# Patient Record
Sex: MALE | Race: WHITE | NOT HISPANIC OR LATINO | Employment: OTHER | URBAN - METROPOLITAN AREA
[De-identification: names, ages, dates, MRNs, and addresses within clinical notes are randomized per-mention and may not be internally consistent; named-entity substitution may affect disease eponyms.]

---

## 2017-01-11 DIAGNOSIS — R73.03 PREDIABETES: ICD-10-CM

## 2017-01-11 DIAGNOSIS — E78.5 HYPERLIPIDEMIA LDL GOAL <130: ICD-10-CM

## 2017-01-11 LAB
ALBUMIN SERPL-MCNC: 4 G/DL (ref 3.4–5)
ALP SERPL-CCNC: 56 U/L (ref 40–150)
ALT SERPL W P-5'-P-CCNC: 23 U/L (ref 0–70)
ANION GAP SERPL CALCULATED.3IONS-SCNC: 5 MMOL/L (ref 3–14)
AST SERPL W P-5'-P-CCNC: 14 U/L (ref 0–45)
BILIRUB SERPL-MCNC: 0.6 MG/DL (ref 0.2–1.3)
BUN SERPL-MCNC: 13 MG/DL (ref 7–30)
CALCIUM SERPL-MCNC: 10.4 MG/DL (ref 8.5–10.1)
CHLORIDE SERPL-SCNC: 105 MMOL/L (ref 94–109)
CHOLEST SERPL-MCNC: 203 MG/DL
CO2 SERPL-SCNC: 32 MMOL/L (ref 20–32)
CREAT SERPL-MCNC: 0.95 MG/DL (ref 0.66–1.25)
GFR SERPL CREATININE-BSD FRML MDRD: 80 ML/MIN/1.7M2
GLUCOSE SERPL-MCNC: 93 MG/DL (ref 70–99)
HBA1C MFR BLD: 5.6 % (ref 4.3–6)
HDLC SERPL-MCNC: 77 MG/DL
LDLC SERPL CALC-MCNC: 98 MG/DL
NONHDLC SERPL-MCNC: 126 MG/DL
POTASSIUM SERPL-SCNC: 4.4 MMOL/L (ref 3.4–5.3)
PROT SERPL-MCNC: 7.1 G/DL (ref 6.8–8.8)
SODIUM SERPL-SCNC: 142 MMOL/L (ref 133–144)
TRIGL SERPL-MCNC: 138 MG/DL

## 2017-01-11 PROCEDURE — 80053 COMPREHEN METABOLIC PANEL: CPT | Performed by: INTERNAL MEDICINE

## 2017-01-11 PROCEDURE — 80061 LIPID PANEL: CPT | Performed by: INTERNAL MEDICINE

## 2017-01-11 PROCEDURE — 83036 HEMOGLOBIN GLYCOSYLATED A1C: CPT | Performed by: INTERNAL MEDICINE

## 2017-01-11 PROCEDURE — 36415 COLL VENOUS BLD VENIPUNCTURE: CPT | Performed by: INTERNAL MEDICINE

## 2017-01-17 ENCOUNTER — OFFICE VISIT (OUTPATIENT)
Dept: INTERNAL MEDICINE | Facility: CLINIC | Age: 62
End: 2017-01-17
Payer: COMMERCIAL

## 2017-01-17 VITALS
DIASTOLIC BLOOD PRESSURE: 74 MMHG | WEIGHT: 223 LBS | SYSTOLIC BLOOD PRESSURE: 136 MMHG | BODY MASS INDEX: 26.33 KG/M2 | HEART RATE: 83 BPM | OXYGEN SATURATION: 97 % | HEIGHT: 77 IN | RESPIRATION RATE: 12 BRPM | TEMPERATURE: 98.1 F

## 2017-01-17 DIAGNOSIS — R73.03 PREDIABETES: ICD-10-CM

## 2017-01-17 DIAGNOSIS — D12.6 BENIGN NEOPLASM OF COLON, UNSPECIFIED PART OF COLON: ICD-10-CM

## 2017-01-17 DIAGNOSIS — Z86.718 PERSONAL HISTORY OF VENOUS THROMBOSIS AND EMBOLISM: ICD-10-CM

## 2017-01-17 DIAGNOSIS — D68.51 HETEROZYGOUS FACTOR V LEIDEN MUTATION (H): ICD-10-CM

## 2017-01-17 DIAGNOSIS — D68.52 PROTHROMBIN GENE MUTATION (H): ICD-10-CM

## 2017-01-17 DIAGNOSIS — Z12.5 SPECIAL SCREENING FOR MALIGNANT NEOPLASM OF PROSTATE: ICD-10-CM

## 2017-01-17 DIAGNOSIS — E78.5 HYPERLIPIDEMIA LDL GOAL <130: ICD-10-CM

## 2017-01-17 DIAGNOSIS — I10 ESSENTIAL HYPERTENSION: Primary | ICD-10-CM

## 2017-01-17 PROCEDURE — 99214 OFFICE O/P EST MOD 30 MIN: CPT | Performed by: INTERNAL MEDICINE

## 2017-01-17 RX ORDER — HYDROCHLOROTHIAZIDE 25 MG/1
25 TABLET ORAL DAILY
Qty: 90 TABLET | Refills: 1 | Status: SHIPPED | OUTPATIENT
Start: 2017-01-17 | End: 2017-05-19

## 2017-01-17 RX ORDER — FOLIC ACID 1 MG/1
1000 TABLET ORAL DAILY
Qty: 100 TABLET | Refills: 3 | Status: SHIPPED | OUTPATIENT
Start: 2017-01-17 | End: 2017-05-19

## 2017-01-17 RX ORDER — VALSARTAN 160 MG/1
160 TABLET ORAL DAILY
Qty: 90 TABLET | Refills: 1 | Status: SHIPPED | OUTPATIENT
Start: 2017-01-17 | End: 2017-09-19

## 2017-01-17 NOTE — NURSING NOTE
"Chief Complaint   Patient presents with     Lipids     Hypertension       Initial /74 mmHg  Pulse 83  Temp(Src) 98.1  F (36.7  C)  Resp 12  Ht 6' 5\" (1.956 m)  Wt 223 lb (101.152 kg)  BMI 26.44 kg/m2  SpO2 97% Estimated body mass index is 26.44 kg/(m^2) as calculated from the following:    Height as of this encounter: 6' 5\" (1.956 m).    Weight as of this encounter: 223 lb (101.152 kg).  BP completed using cuff size: large  Ace CRUZ      "

## 2017-01-17 NOTE — MR AVS SNAPSHOT
After Visit Summary   1/17/2017    Alvin Ontiveros    MRN: [id number]           Patient Information     Date Of Birth          1955        Visit Information        Provider Department      1/17/2017 7:00 AM Du Almaraz MD [facility]        Today's Diagnoses     Essential hypertension    -  1     Prediabetes         Benign neoplasm of colon, unspecified part of colon         Personal history of venous thrombosis and embolism         Hyperlipidemia LDL goal <130         Heterozygous factor V Leiden mutation (H)         Heterozygous Prothrombin Gene Mutation           Care Instructions    Everything looks fine!    Refills of medications have been faxed to your pharmacy.     See me in six months, with fasting labs a few days in advance, sooner if problems.          Follow-ups after your visit        Your next 10 appointments already scheduled     Feb 06, 2017  4:30 PM   [facility]tico[facility] Visit with [facility]   [facility]    [address] [facility] [address]  [facility] [address] [phone]              Who to contact     If you have questions or need follow up information about today's clinic visit or your schedule please contact [facility] directly at [phone].  Normal or non-critical lab and imaging results will be communicated to you by [employer], letter or phone within 4 business days after the clinic has received the results. If you do not hear from us within 7 days, please contact the clinic through [employer] or phone. If you have a critical or abnormal lab result, we will notify you by phone as soon as possible.  Submit refill requests through [employer] or call your pharmacy and they will forward the refill request to us. Please allow 3 business days for your refill to be completed.          Additional Information About Your Visit        [email or web address] Information     [employer] lets you send messages to  "your doctor, view your test results, renew your prescriptions, schedule appointments and more. To sign up, go to [email or web address] . Click on \"Log in\" on the left side of the screen, which will take you to the Welcome page. Then click on \"Sign up Now\" on the right side of the page.     You will be asked to enter the access code listed below, as well as some personal information. Please follow the directions to create your username and password.     Your access code is: [email or web address]  Expires: 2017  7:23 AM     Your access code will  in 90 days. If you need help or a new code, please call your [facility] or [phone].        Care EveryWhere ID     This is your Care EveryWhere ID. This could be used by other organizations to access your [facility] medical records  [id number]        Your Vitals Were     Pulse Temperature Respirations Height BMI (Body Mass Index) Pulse Oximetry    83 98.1  F (36.7  C) 12 6' 5\" (1.956 m) 26.44 kg/m2 97%       Blood Pressure from Last 3 Encounters:   17 136/74   05/10/16 138/78   12/22/15 124/78    Weight from Last 3 Encounters:   17 223 lb (101.152 kg)   05/10/16 219 lb (99.338 kg)   12/22/15 225 lb (102.059 kg)              Today, you had the following     No orders found for display         Where to get your medicines      These medications were sent to [facility] - [address] [address]     Phone:  [phone]    - folic acid 1 MG tablet  - hydrochlorothiazide 25 MG tablet  - valsartan 160 MG tablet       Primary Care Provider Office Phone # Fax #    Du Almaraz [phone]       [facility] [address]  [facility] [address]        Thank you!     Thank you for choosing [facility]  for your care. Our goal is always to provide you with excellent care. Hearing back from our patients is one way we can continue to " improve our services. Please take a few minutes to complete the written survey that you may receive in the mail after your visit with us. Thank you!             Your Updated Medication List - Protect others around you: Learn how to safely use, store and throw away your medicines at www.disposemymeds.org.          This list is accurate as of: 1/17/17  7:31 AM.  Always use your most recent med list.                   Brand Name Dispense Instructions for use    folic acid 1 MG tablet    FOLVITE    100 tablet    Take 1 tablet (1,000 mcg) by mouth daily       hydrochlorothiazide 25 MG tablet    HYDRODIURIL    90 tablet    Take 1 tablet (25 mg) by mouth daily       omeprazole 20 MG CR capsule    priLOSEC    90 capsule    Take 1 capsule (20 mg) by mouth daily       pravastatin 40 MG tablet    PRAVACHOL    90 tablet    Take 1 tablet (40 mg) by mouth daily at bedtime.       valsartan 160 MG tablet    DIOVAN    90 tablet    Take 1 tablet (160 mg) by mouth daily       warfarin 5 MG tablet    JANTOVEN    90 tablet    Take 1 tablet daily except half tablet on Wednesday and Sunday or as instructed based on INR result.

## 2017-01-17 NOTE — PROGRESS NOTES
SUBJECTIVE:                                                    Alvin Ontiveros is a 61 year old male who presents to clinic today for the following health issues:  Prediabetes, hyperlipidemia, hypertension, chronic oral anticoagulation with h/o pulmonary embolism, hereditary clotting predisposition.     Prediabetes --   A1C      5.6   1/11/2017  A1C      5.6   5/3/2016  A1C      6.0   12/15/2015  A1C      5.9   1/8/2015  A1C      5.6   10/29/2012  GLUCOSE   Date Value Ref Range Status   01/11/2017 93 70 - 99 mg/dL Final     CREATININE   Date Value Ref Range Status   01/11/2017 0.95 0.66 - 1.25 mg/dL Final       Hyperlipidemia Follow-Up      Rate your low fat/cholesterol diet?: good    Taking statin?  Yes, no muscle aches from statin - Pravastatin    Other lipid medications/supplements?:  None  Recent Labs   Lab Test  01/11/17   0735  05/03/16   0752   10/15/15   0811  01/08/15   0834   CHOL  203*  210*   < >  197  217*   HDL  77  84   < >  69  71   LDL  98  93   < >  74  117   TRIG  138  165*   < >  269*  147   CHOLHDLRATIO   --    --    --   2.9  3.1    < > = values in this interval not displayed.        Hypertension Follow-up      Outpatient blood pressures are not being checked.    Low Salt Diet: no added salt  BP Readings from Last 3 Encounters:   01/17/17 136/74   05/10/16 138/78   12/22/15 124/78          Amount of exercise or physical activity: 6-7 days/week for an average of 45-60 minutes    Problems taking medications regularly: No    Medication side effects: none    Diet: low salt and low fat/cholesterol      Past/recent records reviewed and discussed for -- medications, colon screening (last colonoscopy 4/28/16 - 1 polyp, 5 year follow up).      Problem list and histories reviewed & adjusted, as indicated.  Additional history: as documented    Problem list, Medication list, Allergies, and Medical/Social/Surgical histories reviewed in [facility] and updated as appropriate.    ROS:  REVIEW OF SYSTEMS: The  "following systems have been completely reviewed and are negative except as noted above:   Constitutional, HEENT, respiratory, cardiovascular, gastrointestinal, genitourinary, dermatologic, hematologic, and neurologic systems.     Noted recent URI with cough and sore throat. Noted intermittent blood in stool.     OBJECTIVE:                                                    /74 mmHg  Pulse 83  Temp(Src) 98.1  F (36.7  C)  Resp 12  Ht 1.956 m (6' 5\")  Wt 101.152 kg (223 lb)  BMI 26.44 kg/m2  SpO2 97%  Body mass index is 26.44 kg/(m^2).  GENERAL: healthy, alert and no distress, overweight  RESP: lungs clear to auscultation - no rales, rhonchi or wheezes  CV: regular rate and rhythm, normal S1 S2, no S3 or S4, no murmur, click or rub, no peripheral edema and peripheral pulses strong  NEURO: mentation intact and speech normal  PSYCH: mentation appears normal, affect normal/bright    Diagnostic Test Results:  Results for orders placed or performed in visit on 01/11/17   Hemoglobin A1c   Result Value Ref Range    Hemoglobin A1C 5.6 4.3 - 6.0 %   Comprehensive metabolic panel   Result Value Ref Range    Sodium 142 133 - 144 mmol/L    Potassium 4.4 3.4 - 5.3 mmol/L    Chloride 105 94 - 109 mmol/L    Carbon Dioxide 32 20 - 32 mmol/L    Anion Gap 5 3 - 14 mmol/L    Glucose 93 70 - 99 mg/dL    Urea Nitrogen 13 7 - 30 mg/dL    Creatinine 0.95 0.66 - 1.25 mg/dL    GFR Estimate 80 >60 mL/min/1.7m2    GFR Estimate If Black >90   GFR Calc   >60 mL/min/1.7m2    Calcium 10.4 (H) 8.5 - 10.1 mg/dL    Bilirubin Total 0.6 0.2 - 1.3 mg/dL    Albumin 4.0 3.4 - 5.0 g/dL    Protein Total 7.1 6.8 - 8.8 g/dL    Alkaline Phosphatase 56 40 - 150 U/L    ALT 23 0 - 70 U/L    AST 14 0 - 45 U/L   Lipid panel reflex to direct LDL   Result Value Ref Range    Cholesterol 203 (H) <200 mg/dL    Triglycerides 138 <150 mg/dL    HDL Cholesterol 77 >39 mg/dL    LDL Cholesterol Calculated 98 <100 mg/dL    Non HDL Cholesterol 126 " <130 mg/dL        ASSESSMENT/PLAN:                                                    (I10) Essential hypertension  (primary encounter diagnosis)  Comment: BP at target. Continue current meds. 136/74  Plan: valsartan (DIOVAN) 160 MG tablet,         hydrochlorothiazide (HYDRODIURIL) 25 MG tablet          (R73.03) Prediabetes  Comment: stable Will continue to follow.  Plan: folic acid (FOLVITE) 1 MG tablet        Follow up in 6 months    (D12.6) Benign neoplasm of colon, unspecified part of colon  Plan: rescreen in 2021    (Z86.718) Personal history of venous thrombosis and embolism  Plan: Continue chronic oral anticoagulation. Continue to follow with anticoagulation clinic. Follow up with MD in 6 months    (E78.5) Hyperlipidemia LDL goal <130  Plan: LDL at target. Continue current meds. follow up 6 months    (D68.51) Heterozygous factor V Leiden mutation (H)  (D68.59) Heterozygous Prothrombin Gene Mutation  Plan: Continue warfarin. Follow up in 6 months      Patient Instructions   Everything looks fine!    Refills of medications have been faxed to your pharmacy.     See me in six months, with fasting labs a few days in advance, sooner if problems.          This document serves as a record of the services and decisions personally performed and made by Du Almaraz MD. It was created on their behalf by Kiran Magdaleno, a trained medical scribe. The creation of this document is based the provider's statements to the medical scribe.  Kiran Magdaleno January 17, 2017 7:23 AM       Du lAmaraz MD  [facility]      Please abstract the following data from this visit with this patient into the appropriate field in Epic:    Colonoscopy done on this date: 7/7/2012 (approximately), by this group: Dr Ling, [facility], results were negative/normal.

## 2017-01-27 DIAGNOSIS — D68.51 HETEROZYGOUS FACTOR V LEIDEN MUTATION (H): Primary | ICD-10-CM

## 2017-01-27 RX ORDER — WARFARIN SODIUM 5 MG/1
TABLET ORAL
Qty: 90 TABLET | Refills: 1 | Status: SHIPPED | OUTPATIENT
Start: 2017-01-27 | End: 2017-07-25

## 2017-01-27 NOTE — TELEPHONE ENCOUNTER
Warfarin       Last Office Visit with [facility], [facility] or [facility] prescribing provider: 1/17/17       Date and Result of Last PT/INR:   INR      2.3   12/27/2016  INR      3.0   11/14/2016  INR     1.05   1/30/2013  INR     1.86   2/5/2006     Prescription approved per [facility] Refill Protocol.  Kari Lizarraga RN

## 2017-02-06 ENCOUNTER — ANTICOAGULATION THERAPY VISIT (OUTPATIENT)
Dept: ANTICOAGULATION | Facility: CLINIC | Age: 62
End: 2017-02-06
Payer: COMMERCIAL

## 2017-02-06 DIAGNOSIS — Z86.718 PERSONAL HISTORY OF VENOUS THROMBOSIS AND EMBOLISM: ICD-10-CM

## 2017-02-06 DIAGNOSIS — D68.51 HETEROZYGOUS FACTOR V LEIDEN MUTATION (H): ICD-10-CM

## 2017-02-06 DIAGNOSIS — D68.52 PROTHROMBIN GENE MUTATION (H): ICD-10-CM

## 2017-02-06 DIAGNOSIS — Z79.01 LONG-TERM (CURRENT) USE OF ANTICOAGULANTS: Primary | ICD-10-CM

## 2017-02-06 LAB — INR POINT OF CARE: 2.5 (ref 0.86–1.14)

## 2017-02-06 PROCEDURE — 99207 ZZC NO CHARGE NURSE ONLY: CPT

## 2017-02-06 PROCEDURE — 85610 PROTHROMBIN TIME: CPT | Mod: QW

## 2017-02-06 PROCEDURE — 36416 COLLJ CAPILLARY BLOOD SPEC: CPT

## 2017-02-06 NOTE — MR AVS SNAPSHOT
Alvin Ontiveros   2/6/2017 4:30 PM   Anticoagulation Therapy Visit    Description:  61 year old male   Provider:  [facility]   Department:  [facility]           INR as of 2/6/2017     Selected INR 2.5 (2/6/2017)      Anticoagulation Summary as of 2/6/2017     INR goal 2.0-3.0   Selected INR 2.5 (2/6/2017)   Full instructions 2.5 mg on Sun, Wed; 5 mg all other days   Next INR check 3/20/2017    Indications   Long-term (current) use of anticoagulants [Z79.01] [Z79.01]  Heterozygous factor V Leiden mutation (H) [D68.51]  Heterozygous Prothrombin Gene Mutation [D68.59]  Personal history of venous thrombosis and embolism [Z86.718]         Your next Anticoagulation Clinic appointment(s)     Mar 20, 2017  4:30 PM   Anticoagulation Visit with [facility]   [facility]    [address] [facility] [address]  [facility] [address] [phone]              Contact Numbers     [facility] Phone Numbers:  Anticoagulation Clinic Appointments : [phone]  Anticoagulation Nurse: [phone]         February 2017 Details    Sun Mon Tue Wed Thu Fri Sat        1               2               3               4                 5               6      5 mg   See details      7      5 mg         8      2.5 mg         9      5 mg         10      5 mg         11      5 mg           12      2.5 mg         13      5 mg         14      5 mg         15      2.5 mg         16      5 mg         17      5 mg         18      5 mg           19      2.5 mg         20      5 mg         21      5 mg         22      2.5 mg         23      5 mg         24      5 mg         25      5 mg           26      2.5 mg         27      5 mg         28      5 mg              Date Details   02/06 This INR check               How to take your warfarin dose     To take:  2.5 mg Take 0.5 of a 5 mg tablet.    To take:  5 mg Take 1 of the 5 mg tablets.           March 2017  Details    Sun Mon Tue Wed Thu Fri Sat        1      2.5 mg         2      5 mg         3      5 mg         4      5 mg           5      2.5 mg         6      5 mg         7      5 mg         8      2.5 mg         9      5 mg         10      5 mg         11      5 mg           12      2.5 mg         13      5 mg         14      5 mg         15      2.5 mg         16      5 mg         17      5 mg         18      5 mg           19      2.5 mg         20            21               22               23               24               25                 26               27               28               29               30               31                 Date Details   No additional details    Date of next INR:  3/20/2017         How to take your warfarin dose     To take:  2.5 mg Take 0.5 of a 5 mg tablet.    To take:  5 mg Take 1 of the 5 mg tablets.

## 2017-02-06 NOTE — PROGRESS NOTES
ANTICOAGULATION FOLLOW-UP CLINIC VISIT    Patient Name:  Alvin Ontiveros  Date:  2/6/2017  Contact Type:  Face to Face    SUBJECTIVE:     Patient Findings     Positives No Problem Findings           OBJECTIVE    INR PROTIME   Date Value Ref Range Status   02/06/2017 2.5* 0.86 - 1.14 Final       ASSESSMENT / PLAN  INR assessment THER    Recheck INR In: 6 WEEKS    INR Location Clinic      Anticoagulation Summary as of 2/6/2017     INR goal 2.0-3.0   Selected INR 2.5 (2/6/2017)   Maintenance plan 2.5 mg (5 mg x 0.5) on Sun, Wed; 5 mg (5 mg x 1) all other days   Full instructions 2.5 mg on Sun, Wed; 5 mg all other days   Weekly total 30 mg   No change documented Luna Oakley RN   Plan last modified Luna Oakley RN (4/11/2016)   Next INR check 3/20/2017   Priority INR   Target end date     Indications   Long-term (current) use of anticoagulants [Z79.01] [Z79.01]  Heterozygous factor V Leiden mutation (H) [D68.51]  Heterozygous Prothrombin Gene Mutation [D68.59]  Personal history of venous thrombosis and embolism [Z86.718]         Anticoagulation Episode Summary     INR check location     Preferred lab     Send INR reminders to [facility]    Comments Takes in AM      Anticoagulation Care Providers     Provider Role Specialty Phone number    Du Almaraz MD [facility] Internal Medicine [phone]            See the Encounter Report to view Anticoagulation Flowsheet and Dosing Calendar (Go to Encounters tab in chart review, and find the Anticoagulation Therapy Visit)        Luna Oakley RN

## 2017-03-14 ENCOUNTER — TELEPHONE (OUTPATIENT)
Dept: INTERNAL MEDICINE | Facility: CLINIC | Age: 62
End: 2017-03-14

## 2017-03-14 NOTE — TELEPHONE ENCOUNTER
Panel Management Review      Patient has the following on his problem list:       IVD   ASA: FAILED    Last LDL:    Lab Results   Component Value Date    CHOL 203 01/11/2017     Lab Results   Component Value Date    HDL 77 01/11/2017     Lab Results   Component Value Date    LDL 98 01/11/2017     Lab Results   Component Value Date    TRIG 138 01/11/2017        Lab Results   Component Value Date    CHOLHDLRATIO 2.9 10/15/2015        Is the patient on a Statin? YES   Is the patient on Aspirin? NO                  Medications     HMG CoA Reductase Inhibitors    pravastatin (PRAVACHOL) 40 MG tablet          Last three blood pressure readings:  BP Readings from Last 3 Encounters:   01/17/17 136/74   05/10/16 138/78   12/22/15 124/78        Tobacco History:     History   Smoking Status     Former Smoker     Packs/day: 1.00     Years: 30.00     Types: Cigarettes     Quit date: 12/7/2005   Smokeless Tobacco     Never Used       Hypertension   Last three blood pressure readings:  BP Readings from Last 3 Encounters:   01/17/17 136/74   05/10/16 138/78   12/22/15 124/78     Blood pressure: Passed    HTN Guidelines:  Age 18-59 BP range:  Less than 140/90  Age 60-85 with Diabetes:  Less than 140/90  Age 60-85 without Diabetes:  less than 150/90      Composite cancer screening  Chart review shows that this patient is due/due soon for the following Colonoscopy  Summary:    Patient is due/failing the following:   COLONOSCOPY    Action needed:   colonoscopy    Type of outreach:    Phone, left message for patient to call back.     Questions for provider review:    None                                                                                                                                    Ace CRUZ       Chart routed to none .

## 2017-03-20 ENCOUNTER — ANTICOAGULATION THERAPY VISIT (OUTPATIENT)
Dept: ANTICOAGULATION | Facility: CLINIC | Age: 62
End: 2017-03-20
Payer: COMMERCIAL

## 2017-03-20 DIAGNOSIS — Z79.01 LONG-TERM (CURRENT) USE OF ANTICOAGULANTS: ICD-10-CM

## 2017-03-20 DIAGNOSIS — Z86.718 PERSONAL HISTORY OF VENOUS THROMBOSIS AND EMBOLISM: ICD-10-CM

## 2017-03-20 DIAGNOSIS — D68.51 HETEROZYGOUS FACTOR V LEIDEN MUTATION (H): ICD-10-CM

## 2017-03-20 DIAGNOSIS — D68.52 PROTHROMBIN GENE MUTATION (H): ICD-10-CM

## 2017-03-20 LAB — INR POINT OF CARE: 2.8 (ref 0.86–1.14)

## 2017-03-20 PROCEDURE — 36416 COLLJ CAPILLARY BLOOD SPEC: CPT

## 2017-03-20 PROCEDURE — 85610 PROTHROMBIN TIME: CPT | Mod: QW

## 2017-03-20 PROCEDURE — 99207 ZZC NO CHARGE NURSE ONLY: CPT

## 2017-03-20 NOTE — PROGRESS NOTES
ANTICOAGULATION FOLLOW-UP CLINIC VISIT    Patient Name:  Alvin Ontiveros  Date:  3/20/2017  Contact Type:  Face to Face    SUBJECTIVE:     Patient Findings     Positives No Problem Findings           OBJECTIVE    INR Protime   Date Value Ref Range Status   03/20/2017 2.8 (A) 0.86 - 1.14 Final       ASSESSMENT / PLAN  INR assessment THER    Recheck INR In: 6 WEEKS    INR Location Clinic      Anticoagulation Summary as of 3/20/2017     INR goal 2.0-3.0   Today's INR 2.8   Maintenance plan 2.5 mg (5 mg x 0.5) on Sun, Wed; 5 mg (5 mg x 1) all other days   Full instructions 2.5 mg on Sun, Wed; 5 mg all other days   Weekly total 30 mg   No change documented Luna Oakley RN   Plan last modified Luna Oakley RN (4/11/2016)   Next INR check 5/1/2017   Priority INR   Target end date     Indications   Long-term (current) use of anticoagulants [Z79.01] [Z79.01]  Heterozygous factor V Leiden mutation (H) [D68.51]  Heterozygous Prothrombin Gene Mutation [D68.59]  Personal history of venous thrombosis and embolism [Z86.718]         Anticoagulation Episode Summary     INR check location     Preferred lab     Send INR reminders to [facility]    Comments Takes in AM      Anticoagulation Care Providers     Provider Role Specialty Phone number    Du Almaraz MD [facility] Internal Medicine [phone]            See the Encounter Report to view Anticoagulation Flowsheet and Dosing Calendar (Go to Encounters tab in chart review, and find the Anticoagulation Therapy Visit)        Luna Oakley, RN

## 2017-03-20 NOTE — MR AVS SNAPSHOT
Alvin Ontiveros   3/20/2017 4:30 PM   Anticoagulation Therapy Visit    Description:  61 year old male   Provider[facility]  [facility]   Department:  [facility]           INR as of 3/20/2017     Today's INR 2.8      Anticoagulation Summary as of 3/20/2017     INR goal 2.0-3.0   Today's INR 2.8   Full instructions 2.5 mg on Sun, Wed; 5 mg all other days   Next INR check 5/1/2017    Indications   Long-term (current) use of anticoagulants [Z79.01] [Z79.01]  Heterozygous factor V Leiden mutation (H) [D68.51]  Heterozygous Prothrombin Gene Mutation [D68.59]  Personal history of venous thrombosis and embolism [Z86.718]         Your next Anticoagulation Clinic appointment(s)     May 01, 2017  4:30 PM CDT   Anticoagulation Visit with [facility]   [facility]    [address] [facility] [address]  [facility] [id number] [phone]              Contact Numbers     [facility] Phone Numbers:  Anticoagulation Clinic Appointments : [phone]  Anticoagulation Nurse: [phone]         March 2017 Details    Sun Mon Tue Wed Thu Fri Sat        1               2               3               4                 5               6               7               8               9               10               11                 12               13               14               15               16               17               18                 19               20      5 mg   See details      21      5 mg         22      2.5 mg         23      5 mg         24      5 mg         25      5 mg           26      2.5 mg         27      5 mg         28      5 mg         29      2.5 mg         30      5 mg         31      5 mg           Date Details   03/20 This INR check               How to take your warfarin dose     To take:  2.5 mg Take 0.5 of a 5 mg tablet.    To take:  5 mg Take 1 of the 5 mg tablets.           April 2017 Details    Sun Mon Tue Wed  Thu Fri Sat           1      5 mg           2      2.5 mg         3      5 mg         4      5 mg         5      2.5 mg         6      5 mg         7      5 mg         8      5 mg           9      2.5 mg         10      5 mg         11      5 mg         12      2.5 mg         13      5 mg         14      5 mg         15      5 mg           16      2.5 mg         17      5 mg         18      5 mg         19      2.5 mg         20      5 mg         21      5 mg         22      5 mg           23      2.5 mg         24      5 mg         25      5 mg         26      2.5 mg         27      5 mg         28      5 mg         29      5 mg           30      2.5 mg                Date Details   No additional details            How to take your warfarin dose     To take:  2.5 mg Take 0.5 of a 5 mg tablet.    To take:  5 mg Take 1 of the 5 mg tablets.           May 2017 Details    Sun Mon Tue Wed Thu Fri Sat      1            2               3               4               5               6                 7               8               9               10               11               12               13                 14               15               16               17               18               19               20                 21               22               23               24               25               26               27                 28               29               30               31                   Date Details   No additional details    Date of next INR:  5/1/2017         How to take your warfarin dose     To take:  5 mg Take 1 of the 5 mg tablets.

## 2017-05-01 ENCOUNTER — ANTICOAGULATION THERAPY VISIT (OUTPATIENT)
Dept: ANTICOAGULATION | Facility: CLINIC | Age: 62
End: 2017-05-01
Payer: COMMERCIAL

## 2017-05-01 DIAGNOSIS — Z86.718 PERSONAL HISTORY OF VENOUS THROMBOSIS AND EMBOLISM: ICD-10-CM

## 2017-05-01 DIAGNOSIS — D68.52 PROTHROMBIN GENE MUTATION (H): ICD-10-CM

## 2017-05-01 DIAGNOSIS — D68.51 HETEROZYGOUS FACTOR V LEIDEN MUTATION (H): ICD-10-CM

## 2017-05-01 DIAGNOSIS — Z79.01 LONG-TERM (CURRENT) USE OF ANTICOAGULANTS: ICD-10-CM

## 2017-05-01 LAB — INR POINT OF CARE: 2 (ref 0.86–1.14)

## 2017-05-01 PROCEDURE — 36416 COLLJ CAPILLARY BLOOD SPEC: CPT

## 2017-05-01 PROCEDURE — 99207 ZZC NO CHARGE NURSE ONLY: CPT

## 2017-05-01 PROCEDURE — 85610 PROTHROMBIN TIME: CPT | Mod: QW

## 2017-05-01 NOTE — PROGRESS NOTES
ANTICOAGULATION FOLLOW-UP CLINIC VISIT    Patient Name:  Alvin Ontiveros  Date:  5/1/2017  Contact Type:  Face to Face    SUBJECTIVE:     Patient Findings     Positives No Problem Findings           OBJECTIVE    INR Protime   Date Value Ref Range Status   05/01/2017 2.0 (A) 0.86 - 1.14 Final       ASSESSMENT / PLAN  INR assessment THER    Recheck INR In: 6 WEEKS    INR Location Clinic      Anticoagulation Summary as of 5/1/2017     INR goal 2.0-3.0   Today's INR 2.0   Maintenance plan 2.5 mg (5 mg x 0.5) on Sun, Wed; 5 mg (5 mg x 1) all other days   Full instructions 2.5 mg on Sun, Wed; 5 mg all other days   Weekly total 30 mg   No change documented Luna Oakley RN   Plan last modified Luna Oakley RN (4/11/2016)   Next INR check 6/12/2017   Priority INR   Target end date     Indications   Long-term (current) use of anticoagulants [Z79.01] [Z79.01]  Heterozygous factor V Leiden mutation (H) [D68.51]  Heterozygous Prothrombin Gene Mutation [D68.59]  Personal history of venous thrombosis and embolism [Z86.718]         Anticoagulation Episode Summary     INR check location     Preferred lab     Send INR reminders to [facility]    Comments Takes in AM      Anticoagulation Care Providers     Provider Role Specialty Phone number    Du Almaraz MD [facility] Internal Medicine [phone]            See the Encounter Report to view Anticoagulation Flowsheet and Dosing Calendar (Go to Encounters tab in chart review, and find the Anticoagulation Therapy Visit)        Luna Oakley, RN

## 2017-05-01 NOTE — MR AVS SNAPSHOT
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]  Personal history of venous thrombosis and embolism [Z86.718]         Your next Anticoagulation Clinic appointment(s)     Jun 12, 2017  4:30 PM CDT   Anticoagulation Visit with [facility]   [facility] ([facility])    [address] [facility] [address]  [facility] [id number] [phone]              Contact Numbers     [facility] Phone Numbers:  Anticoagulation Clinic Appointments : [phone]  Anticoagulation Nurse: [phone]         May 2017 Details    Sun Mon Tue Wed Thu Fri Sat      1      5 mg   See details      2      5 mg         3      2.5 mg         4      5 mg         5      5 mg         6      5 mg           7      2.5 mg         8      5 mg         9      5 mg         10      2.5 mg         11      5 mg         12      5 mg         13      5 mg           14      2.5 mg         15      5 mg         16      5 mg         17      2.5 mg         18      5 mg         19      5 mg         20      5 mg           21      2.5 mg         22      5 mg         23      5 mg         24      2.5 mg         25      5 mg         26      5 mg         27      5 mg           28      2.5 mg         29      5 mg         30      5 mg         31      2.5 mg             Date Details   05/01 This INR check               How to take your warfarin dose     To take:  2.5 mg Take 0.5 of a 5 mg tablet.    To take:   5 mg Take 1 of the 5 mg tablets.           June 2017 Details    Sun Mon Tue Wed Thu Fri Sat         1      5 mg         2      5 mg         3      5 mg           4      2.5 mg         5      5 mg         6      5 mg         7      2.5 mg         8      5 mg         9      5 mg         10      5 mg           11      2.5 mg         12            13               14               15               16               17                 18               19               20               21               22               23               24                 25               26               27               28               29               30                 Date Details   No additional details    Date of next INR:  6/12/2017         How to take your warfarin dose     To take:  2.5 mg Take 0.5 of a 5 mg tablet.    To take:  5 mg Take 1 of the 5 mg tablets.

## 2017-05-19 DIAGNOSIS — R73.03 PREDIABETES: ICD-10-CM

## 2017-05-19 DIAGNOSIS — I10 ESSENTIAL HYPERTENSION: ICD-10-CM

## 2017-05-19 RX ORDER — HYDROCHLOROTHIAZIDE 25 MG/1
25 TABLET ORAL DAILY
Qty: 30 TABLET | Refills: 0 | Status: SHIPPED | OUTPATIENT
Start: 2017-05-19 | End: 2017-09-19

## 2017-05-19 RX ORDER — FOLIC ACID 1 MG/1
1000 TABLET ORAL DAILY
Qty: 100 TABLET | Refills: 0 | Status: SHIPPED | OUTPATIENT
Start: 2017-05-19 | End: 2017-09-19

## 2017-05-19 NOTE — TELEPHONE ENCOUNTER
Pt called and lM on Nurse Line  Pt wife call back before I could get message off line.    She was looking for 2 refills.  Both had been sent to [facility] in Jan   Offered to sent a 30 day supply to local pharmacy.  Done.    I will call Pharmacy about the refills..

## 2017-05-19 NOTE — TELEPHONE ENCOUNTER
Pharmacy called and it was the same issue.  Pt reports old RX number and it doesn't auto call up RX.    They will be sending out the refill today.    Eagle HOOPER RN      Pt called and reached and informed.  Eagle HOOPER RN

## 2017-06-20 DIAGNOSIS — K21.9 GASTROESOPHAGEAL REFLUX DISEASE WITHOUT ESOPHAGITIS: ICD-10-CM

## 2017-06-21 NOTE — TELEPHONE ENCOUNTER
Omeprazole      Last Written Prescription Date: 12/21/16  Last Fill Quantity: 90,  # refills: 1   Last Office Visit with [facility], [facility] or [facility] prescribing provider: 01/17/17                                         Next 5 appointments (look out 90 days)     Sep 19, 2017  7:00 AM CDT   SHORT with Du Almaraz MD   [facility] ([facility])    [address] [place]  [facility] [id number] [phone]

## 2017-06-28 ENCOUNTER — ANTICOAGULATION THERAPY VISIT (OUTPATIENT)
Dept: ANTICOAGULATION | Facility: CLINIC | Age: 62
End: 2017-06-28
Payer: COMMERCIAL

## 2017-06-28 DIAGNOSIS — D68.51 HETEROZYGOUS FACTOR V LEIDEN MUTATION (H): ICD-10-CM

## 2017-06-28 DIAGNOSIS — D68.52 PROTHROMBIN GENE MUTATION (H): ICD-10-CM

## 2017-06-28 DIAGNOSIS — Z86.718 PERSONAL HISTORY OF VENOUS THROMBOSIS AND EMBOLISM: ICD-10-CM

## 2017-06-28 DIAGNOSIS — Z79.01 LONG-TERM (CURRENT) USE OF ANTICOAGULANTS: ICD-10-CM

## 2017-06-28 LAB — INR POINT OF CARE: 2.2 (ref 0.86–1.14)

## 2017-06-28 PROCEDURE — 99207 ZZC NO CHARGE NURSE ONLY: CPT

## 2017-06-28 PROCEDURE — 36416 COLLJ CAPILLARY BLOOD SPEC: CPT

## 2017-06-28 PROCEDURE — 85610 PROTHROMBIN TIME: CPT | Mod: QW

## 2017-06-28 NOTE — PROGRESS NOTES
ANTICOAGULATION FOLLOW-UP CLINIC VISIT    Patient Name:  Alvin Ontiveros  Date:  6/28/2017  Contact Type:  Face to Face    SUBJECTIVE:     Patient Findings     Positives Nose bleeds (nose bleed today.)           OBJECTIVE    INR Protime   Date Value Ref Range Status   06/28/2017 2.2 (A) 0.86 - 1.14 Final       ASSESSMENT / PLAN  INR assessment THER    Recheck INR In: 6 WEEKS    INR Location Clinic      Anticoagulation Summary as of 6/28/2017     INR goal 2.0-3.0   Today's INR 2.2   Maintenance plan 2.5 mg (5 mg x 0.5) on Sun, Wed; 5 mg (5 mg x 1) all other days   Full instructions 2.5 mg on Sun, Wed; 5 mg all other days   Weekly total 30 mg   No change documented Kari Lizarraga RN   Plan last modified Luna Oakley RN (4/11/2016)   Next INR check 8/7/2017   Priority INR   Target end date     Indications   Long-term (current) use of anticoagulants [Z79.01] [Z79.01]  Heterozygous factor V Leiden mutation (H) [D68.51]  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]  Personal history of venous thrombosis and embolism [Z86.718]         Anticoagulation Episode Summary     INR check location     Preferred lab     Send INR reminders to [facility]    Comments Takes in AM      Anticoagulation Care Providers     Provider Role Specialty Phone number    Du Almaraz MD [facility] Internal Medicine [phone]            See the Encounter Report to view Anticoagulation Flowsheet and Dosing Calendar (Go to Encounters tab in chart review, and find the Anticoagulation Therapy Visit)    Dosage adjustment made based on physician directed care plan.    Kari Lizarraga RN

## 2017-06-28 NOTE — MR AVS SNAPSHOT
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 Phone Numbers:  Anticoagulation Clinic Appointments : [phone]  Anticoagulation Nurse: [phone]         June 2017 Details    Sun Mon Tue Wed Thu Fri Sat         1               2               3                 4               5               6               7               8               9               10                 11               12               13               14               15               16               17                 18               19               20               21               22               23               24                 25               26               27               28      2.5 mg   See details      29      5 mg         30      5 mg           Date Details   06/28 This INR check               How to take your warfarin dose     To take:  2.5 mg Take 0.5 of a 5 mg tablet.    To take:  5 mg Take 1 of the 5 mg tablets.           July 2017 Details    Sun Mon Tue Wed Thu Fri Sat           1      5 mg           2      2.5 mg          3      5 mg         4      5 mg         5      2.5 mg         6      5 mg         7      5 mg         8      5 mg           9      2.5 mg         10      5 mg         11      5 mg         12      2.5 mg         13      5 mg         14      5 mg         15      5 mg           16      2.5 mg         17      5 mg         18      5 mg         19      2.5 mg         20      5 mg         21      5 mg         22      5 mg           23      2.5 mg         24      5 mg         25      5 mg         26      2.5 mg         27      5 mg         28      5 mg         29      5 mg           30      2.5 mg         31      5 mg               Date Details   No additional details            How to take your warfarin dose     To take:  2.5 mg Take 0.5 of a 5 mg tablet.    To take:  5 mg Take 1 of the 5 mg tablets.           August 2017 Details    Sun Mon Tue Wed Thu Fri Sat       1      5 mg         2      2.5 mg         3      5 mg         4      5 mg         5      5 mg           6      2.5 mg         7            8               9               10               11               12                 13               14               15               16               17               18               19                 20               21               22               23               24               25               26                 27               28               29               30               31                  Date Details   No additional details    Date of next INR:  8/7/2017         How to take your warfarin dose     To take:  2.5 mg Take 0.5 of a 5 mg tablet.    To take:  5 mg Take 1 of the 5 mg tablets.

## 2017-07-25 DIAGNOSIS — D68.51 HETEROZYGOUS FACTOR V LEIDEN MUTATION (H): ICD-10-CM

## 2017-07-26 RX ORDER — WARFARIN SODIUM 5 MG/1
TABLET ORAL
Qty: 90 TABLET | Refills: 1 | Status: SHIPPED | OUTPATIENT
Start: 2017-07-26 | End: 2018-01-21

## 2017-08-07 ENCOUNTER — ANTICOAGULATION THERAPY VISIT (OUTPATIENT)
Dept: ANTICOAGULATION | Facility: CLINIC | Age: 62
End: 2017-08-07
Payer: COMMERCIAL

## 2017-08-07 ENCOUNTER — TELEPHONE (OUTPATIENT)
Dept: ANTICOAGULATION | Facility: CLINIC | Age: 62
End: 2017-08-07

## 2017-08-07 DIAGNOSIS — D68.52 PROTHROMBIN GENE MUTATION (H): ICD-10-CM

## 2017-08-07 DIAGNOSIS — D68.51 HETEROZYGOUS FACTOR V LEIDEN MUTATION (H): ICD-10-CM

## 2017-08-07 DIAGNOSIS — Z79.01 LONG-TERM (CURRENT) USE OF ANTICOAGULANTS: ICD-10-CM

## 2017-08-07 DIAGNOSIS — D68.51 HETEROZYGOUS FACTOR V LEIDEN MUTATION (H): Primary | ICD-10-CM

## 2017-08-07 DIAGNOSIS — Z86.718 PERSONAL HISTORY OF VENOUS THROMBOSIS AND EMBOLISM: ICD-10-CM

## 2017-08-07 LAB — INR POINT OF CARE: 3.1 (ref 0.86–1.14)

## 2017-08-07 PROCEDURE — 85610 PROTHROMBIN TIME: CPT | Mod: QW

## 2017-08-07 PROCEDURE — 99207 ZZC NO CHARGE NURSE ONLY: CPT

## 2017-08-07 PROCEDURE — 36416 COLLJ CAPILLARY BLOOD SPEC: CPT

## 2017-08-07 NOTE — TELEPHONE ENCOUNTER
For insurance purposes, an annual INR referral is required. Please sign the order and route back to the INR Clinic. Luna Oakley RN

## 2017-08-07 NOTE — PROGRESS NOTES
ANTICOAGULATION FOLLOW-UP CLINIC VISIT    Patient Name:  Avlin Ontiveros  Date:  8/7/2017  Contact Type:  Face to Face    SUBJECTIVE:     Patient Findings     Positives No Problem Findings           OBJECTIVE    INR Protime   Date Value Ref Range Status   08/07/2017 3.1 (A) 0.86 - 1.14 Final       ASSESSMENT / PLAN  INR assessment THER    Recheck INR In: 6 WEEKS    INR Location Clinic      Anticoagulation Summary as of 8/7/2017     INR goal 2.0-3.0   Today's INR 3.1!   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]  Personal history of venous thrombosis and embolism [Z86.718]         Anticoagulation Episode Summary     INR check location     Preferred lab     Send INR reminders to [facility]    Comments Takes in AM      Anticoagulation Care Providers     Provider Role Specialty Phone number    Du Almaraz MD [facility] Internal Medicine [phone]            See the Encounter Report to view Anticoagulation Flowsheet and Dosing Calendar (Go to Encounters tab in chart review, and find the Anticoagulation Therapy Visit)    Dosage adjustment made based on physician directed care plan.    Luna Oakley RN

## 2017-08-07 NOTE — MR AVS SNAPSHOT
Alvin Ontiveros   8/7/2017 4:30 PM   Anticoagulation Therapy Visit    Description:  61 year old male   Provider:  [facility]   Department:  [facility]           INR as of 8/7/2017     Today's INR 3.1!      Anticoagulation Summary as of 8/7/2017     INR goal 2.0-3.0   Today's INR 3.1!   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]  Personal history of venous thrombosis and embolism [Z86.718]         Your next Anticoagulation Clinic appointment(s)     Sep 19, 2017  7:30 AM CDT   Anticoagulation Visit with [facility]   [facility] ([facility]    [address] [facility] [address]  [facility] [id number] [phone]              Contact Numbers     [facility] Phone Numbers:  Anticoagulation Clinic Appointments : [phone]  Anticoagulation Nurse: [phone]         August 2017 Details    Sun Mon Tue Wed Thu Fri Sat       1               2               3               4               5                 6               7      5 mg   See details      8      5 mg         9      2.5 mg         10      5 mg         11      5 mg         12      5 mg           13      2.5 mg         14      5 mg         15      5 mg         16      2.5 mg         17      5 mg         18      5 mg         19      5 mg           20      2.5 mg         21      5 mg         22      5 mg         23      2.5 mg         24      5 mg         25      5 mg         26      5 mg           27      2.5 mg         28      5 mg         29      5 mg         30      2.5 mg         31      5 mg            Date Details   08/07 This INR check               How to take your warfarin dose     To take:  2.5 mg Take 0.5 of a 5 mg tablet.    To take:  5 mg Take 1 of the 5  mg tablets.           September 2017 Details    Sun Mon Tue Wed Thu Fri Sat          1      5 mg         2      5 mg           3      2.5 mg         4      5 mg         5      5 mg         6      2.5 mg         7      5 mg         8      5 mg         9      5 mg           10      2.5 mg         11      5 mg         12      5 mg         13      2.5 mg         14      5 mg         15      5 mg         16      5 mg           17      2.5 mg         18      5 mg         19            20               21               22               23                 24               25               26               27               28               29               30                Date Details   No additional details    Date of next INR:  9/19/2017         How to take your warfarin dose     To take:  2.5 mg Take 0.5 of a 5 mg tablet.    To take:  5 mg Take 1 of the 5 mg tablets.

## 2017-09-12 DIAGNOSIS — R73.03 PREDIABETES: ICD-10-CM

## 2017-09-12 DIAGNOSIS — Z12.5 SPECIAL SCREENING FOR MALIGNANT NEOPLASM OF PROSTATE: ICD-10-CM

## 2017-09-12 DIAGNOSIS — E78.5 HYPERLIPIDEMIA LDL GOAL <130: ICD-10-CM

## 2017-09-12 LAB
ALBUMIN SERPL-MCNC: 3.9 G/DL (ref 3.4–5)
ALP SERPL-CCNC: 53 U/L (ref 40–150)
ALT SERPL W P-5'-P-CCNC: 30 U/L (ref 0–70)
ANION GAP SERPL CALCULATED.3IONS-SCNC: 4 MMOL/L (ref 3–14)
AST SERPL W P-5'-P-CCNC: 19 U/L (ref 0–45)
BILIRUB SERPL-MCNC: 0.6 MG/DL (ref 0.2–1.3)
BUN SERPL-MCNC: 14 MG/DL (ref 7–30)
CALCIUM SERPL-MCNC: 10.4 MG/DL (ref 8.5–10.1)
CHLORIDE SERPL-SCNC: 105 MMOL/L (ref 94–109)
CHOLEST SERPL-MCNC: 211 MG/DL
CO2 SERPL-SCNC: 32 MMOL/L (ref 20–32)
CREAT SERPL-MCNC: 0.93 MG/DL (ref 0.66–1.25)
GFR SERPL CREATININE-BSD FRML MDRD: 82 ML/MIN/1.7M2
GLUCOSE SERPL-MCNC: 120 MG/DL (ref 70–99)
HBA1C MFR BLD: 5.7 % (ref 4.3–6)
HDLC SERPL-MCNC: 90 MG/DL
LDLC SERPL CALC-MCNC: 102 MG/DL
NONHDLC SERPL-MCNC: 121 MG/DL
POTASSIUM SERPL-SCNC: 4.6 MMOL/L (ref 3.4–5.3)
PROT SERPL-MCNC: 7.6 G/DL (ref 6.8–8.8)
PSA SERPL-ACNC: 0.78 UG/L (ref 0–4)
SODIUM SERPL-SCNC: 141 MMOL/L (ref 133–144)
TRIGL SERPL-MCNC: 97 MG/DL

## 2017-09-12 PROCEDURE — 36415 COLL VENOUS BLD VENIPUNCTURE: CPT | Performed by: INTERNAL MEDICINE

## 2017-09-12 PROCEDURE — 80053 COMPREHEN METABOLIC PANEL: CPT | Performed by: INTERNAL MEDICINE

## 2017-09-12 PROCEDURE — G0103 PSA SCREENING: HCPCS | Performed by: INTERNAL MEDICINE

## 2017-09-12 PROCEDURE — 80061 LIPID PANEL: CPT | Performed by: INTERNAL MEDICINE

## 2017-09-12 PROCEDURE — 83036 HEMOGLOBIN GLYCOSYLATED A1C: CPT | Performed by: INTERNAL MEDICINE

## 2017-09-19 ENCOUNTER — OFFICE VISIT (OUTPATIENT)
Dept: INTERNAL MEDICINE | Facility: CLINIC | Age: 62
End: 2017-09-19
Payer: COMMERCIAL

## 2017-09-19 ENCOUNTER — ANTICOAGULATION THERAPY VISIT (OUTPATIENT)
Dept: ANTICOAGULATION | Facility: CLINIC | Age: 62
End: 2017-09-19
Payer: COMMERCIAL

## 2017-09-19 VITALS
DIASTOLIC BLOOD PRESSURE: 76 MMHG | OXYGEN SATURATION: 98 % | TEMPERATURE: 98.2 F | WEIGHT: 226.8 LBS | BODY MASS INDEX: 26.78 KG/M2 | HEART RATE: 74 BPM | SYSTOLIC BLOOD PRESSURE: 136 MMHG | HEIGHT: 77 IN

## 2017-09-19 DIAGNOSIS — Z86.718 PERSONAL HISTORY OF VENOUS THROMBOSIS AND EMBOLISM: ICD-10-CM

## 2017-09-19 DIAGNOSIS — E78.5 HYPERLIPIDEMIA LDL GOAL <130: ICD-10-CM

## 2017-09-19 DIAGNOSIS — M79.652 PAIN OF LEFT THIGH: ICD-10-CM

## 2017-09-19 DIAGNOSIS — I10 ESSENTIAL HYPERTENSION: ICD-10-CM

## 2017-09-19 DIAGNOSIS — K21.9 GASTROESOPHAGEAL REFLUX DISEASE WITHOUT ESOPHAGITIS: ICD-10-CM

## 2017-09-19 DIAGNOSIS — R73.03 PREDIABETES: Primary | ICD-10-CM

## 2017-09-19 DIAGNOSIS — Z79.01 LONG-TERM (CURRENT) USE OF ANTICOAGULANTS: ICD-10-CM

## 2017-09-19 DIAGNOSIS — D68.51 HETEROZYGOUS FACTOR V LEIDEN MUTATION (H): ICD-10-CM

## 2017-09-19 DIAGNOSIS — D68.52 PROTHROMBIN GENE MUTATION (H): ICD-10-CM

## 2017-09-19 LAB — INR POINT OF CARE: 2.8 (ref 0.86–1.14)

## 2017-09-19 PROCEDURE — 85610 PROTHROMBIN TIME: CPT | Mod: QW

## 2017-09-19 PROCEDURE — 99207 ZZC NO CHARGE NURSE ONLY: CPT

## 2017-09-19 PROCEDURE — 36416 COLLJ CAPILLARY BLOOD SPEC: CPT

## 2017-09-19 PROCEDURE — 99214 OFFICE O/P EST MOD 30 MIN: CPT | Performed by: INTERNAL MEDICINE

## 2017-09-19 RX ORDER — HYDROCHLOROTHIAZIDE 25 MG/1
25 TABLET ORAL DAILY
Qty: 90 TABLET | Refills: 3 | Status: SHIPPED | OUTPATIENT
Start: 2017-09-19 | End: 2018-06-07

## 2017-09-19 RX ORDER — FOLIC ACID 1 MG/1
1000 TABLET ORAL DAILY
Qty: 100 TABLET | Refills: 3 | Status: SHIPPED | OUTPATIENT
Start: 2017-09-19 | End: 2018-06-07

## 2017-09-19 RX ORDER — PRAVASTATIN SODIUM 40 MG
40 TABLET ORAL DAILY
Qty: 90 TABLET | Refills: 3 | Status: SHIPPED | OUTPATIENT
Start: 2017-09-19 | End: 2018-06-07

## 2017-09-19 RX ORDER — VALSARTAN 160 MG/1
160 TABLET ORAL DAILY
Qty: 90 TABLET | Refills: 3 | Status: SHIPPED | OUTPATIENT
Start: 2017-09-19 | End: 2018-06-07

## 2017-09-19 NOTE — PROGRESS NOTES
ANTICOAGULATION FOLLOW-UP CLINIC VISIT    Patient Name:  Alvin Ontiveros  Date:  9/19/2017  Contact Type:  Face to Face    SUBJECTIVE:     Patient Findings     Positives No Problem Findings           OBJECTIVE    INR Protime   Date Value Ref Range Status   09/19/2017 2.8 (A) 0.86 - 1.14 Final       ASSESSMENT / PLAN  INR assessment THER    Recheck INR In: 6 WEEKS    INR Location Clinic      Anticoagulation Summary as of 9/19/2017     INR goal 2.0-3.0   Today's INR 2.8   Maintenance plan 2.5 mg (5 mg x 0.5) on Sun, Wed; 5 mg (5 mg x 1) all other days   Full instructions 2.5 mg on Sun, Wed; 5 mg all other days   Weekly total 30 mg   No change documented Luna Oakley RN   Plan last modified Luna Oakley RN (4/11/2016)   Next INR check 10/31/2017   Priority INR   Target end date     Indications   Long-term (current) use of anticoagulants [Z79.01] [Z79.01]  Heterozygous factor V Leiden mutation (H) [D68.51]  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]  Personal history of venous thrombosis and embolism [Z86.718]         Anticoagulation Episode Summary     INR check location     Preferred lab     Send INR reminders to [facility]    Comments Takes in AM      Anticoagulation Care Providers     Provider Role Specialty Phone number    Du Almaraz MD [facility] Internal Medicine [phone]            See the Encounter Report to view Anticoagulation Flowsheet and Dosing Calendar (Go to Encounters tab in chart review, and find the Anticoagulation Therapy Visit)    Dosage adjustment made based on physician directed care plan.    Luna Oakley RN

## 2017-09-19 NOTE — PATIENT INSTRUCTIONS
"Blood pressure, LDL-Cholesterol and glucose look stable.    Continue to focus on watching carb portions and exercising regularly.     Consider attending \"Prediabetes class (contact information provided).    Consider seeing the sports medicine provider regarding the left thigh discomfort.     See me in six months, fasting labs a few days in advance.  "

## 2017-09-19 NOTE — PROGRESS NOTES
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      Hyperlipidemia Follow-Up      Rate your low fat/cholesterol diet?: fair    Taking statin?  Yes, no muscle aches from statin    Other lipid medications/supplements?:  none    Hypertension Follow-up      Outpatient blood pressures are not being checked.    Low Salt Diet: no added salt        Amount of exercise or physical activity: walks on treadmill 5 days a week    Problems taking medications regularly: No    Medication side effects: none    Diet: low salt      The patient's A1c is in pre-diabetes range at 5.7 per most recent labs. The patient inquires about events that increase blood glucose levels and how to keep his level down. We reviewed the importance of watching carbohydrate portions and remaining physically active.     The patient's blood pressure is within range today.     Left Hip Discomfort  The patient relates slight left hip discomfort with specific movements. He states that he can cross his right leg with comfort, but his left leg is not able to do the same movement. The patient reports the left hip does not cause him pain, just discomfort and limited motion.     Past/recent records reviewed and discussed for:  -Colonoscopy in 4/28/2016, 1 polyp resected. Q 5 years.   -Heartburn well-controlled with omeprazole, no concerns    Problem list and histories reviewed & adjusted, as indicated.  Additional history: as documented      Reviewed and updated as needed this visit by clinical staffTobacco  Allergies  Meds  Med Hx  Surg Hx  Fam Hx  Soc Hx      Reviewed and updated as needed this visit by Provider         ROS:  POSITIVE for occasional bright red blood in the stool.    No dyspnea or cough. No chest discomfort, dizziness or palpitations. No diarrhea, abdominal pain.  "  No acute problems with vision or speech, lateralizing weakness or paresthesias.    ROS: as above or negative for Respiratory, CV, GI, endocrine, neuro systems.     This document serves as a record of the services and decisions personally performed and made by Du Almaraz MD. It was created on his behalf by Jennifer Stephenson, a trained medical scribe. The creation of this document is based on the provider's statements to the medical scribe.  Jennifer Stephenson September 19, 2017 7:24 AM      OBJECTIVE:     /76 (BP Location: Left arm, Patient Position: Sitting, Cuff Size: Adult Large)  Pulse 74  Temp 98.2  F (36.8  C) (Oral)  Ht 1.956 m (6' 5\")  Wt 102.9 kg (226 lb 12.8 oz)  SpO2 98%  BMI 26.89 kg/m2  Body mass index is 26.89 kg/(m^2).  GENERAL: healthy, alert and no distress  NECK: no adenopathy, no asymmetry, masses, or scars and thyroid normal to palpation  RESP: lungs clear to auscultation - no rales, rhonchi or wheezes  CV: regular rate and rhythm, normal S1 S2, no S3 or S4, no murmur, click or rub, no peripheral edema and peripheral pulses strong  MS: limited range of motion of left hip compared to right hip with testing passive hip range of motion.   NEURO: Normal strength and tone, mentation intact and speech normal  PSYCH: mentation appears normal, affect normal/bright    Diagnostic Test Results:  Results for orders placed or performed in visit on 09/12/17   Hemoglobin A1c   Result Value Ref Range    Hemoglobin A1C 5.7 4.3 - 6.0 %   PSA, screen   Result Value Ref Range    PSA 0.78 0 - 4 ug/L   Comprehensive metabolic panel   Result Value Ref Range    Sodium 141 133 - 144 mmol/L    Potassium 4.6 3.4 - 5.3 mmol/L    Chloride 105 94 - 109 mmol/L    Carbon Dioxide 32 20 - 32 mmol/L    Anion Gap 4 3 - 14 mmol/L    Glucose 120 (H) 70 - 99 mg/dL    Urea Nitrogen 14 7 - 30 mg/dL    Creatinine 0.93 0.66 - 1.25 mg/dL    GFR Estimate 82 >60 mL/min/1.7m2    GFR Estimate If Black >90 >60 mL/min/1.7m2    Calcium " "10.4 (H) 8.5 - 10.1 mg/dL    Bilirubin Total 0.6 0.2 - 1.3 mg/dL    Albumin 3.9 3.4 - 5.0 g/dL    Protein Total 7.6 6.8 - 8.8 g/dL    Alkaline Phosphatase 53 40 - 150 U/L    ALT 30 0 - 70 U/L    AST 19 0 - 45 U/L   Lipid panel reflex to direct LDL   Result Value Ref Range    Cholesterol 211 (H) <200 mg/dL    Triglycerides 97 <150 mg/dL    HDL Cholesterol 90 >39 mg/dL    LDL Cholesterol Calculated 102 (H) <100 mg/dL    Non HDL Cholesterol 121 <130 mg/dL       ASSESSMENT/PLAN:   (R73.03) Prediabetes  (primary encounter diagnosis)  Comment: A1c in prediabetes range at 5.7. Offered referral to diabetes educator.  Plan: DIABETES EDUCATOR REFERRAL, folic acid         (FOLVITE) 1 MG tablet        Follow up in 6 months.    (E78.5) Hyperlipidemia LDL goal <130  Comment: LDL at target. Continue current meds.   Plan: pravastatin (PRAVACHOL) 40 MG tablet          (I10) Essential hypertension  Comment: BP at target. Continue current meds.   Plan: hydrochlorothiazide (HYDRODIURIL) 25 MG tablet,        valsartan (DIOVAN) 160 MG tablet          (D68.51) Heterozygous factor V Leiden mutation (H)  (D68.52) Heterozygous Prothrombin Gene Mutation   (Z79.01) Long-term (current) use of anticoagulants [Z79.01]  Plan: Continue warfarin.    (M79.652) Pain of left thigh  Comment: Discomfort of left thigh with movement. Ortho referral provided.  Plan: ORTHO  REFERRAL     (K21.9) Gastroesophageal reflux disease without esophagitis  Comment: Stable. Continue current meds.  Plan: omeprazole (PRILOSEC) 20 MG CR capsule            Patient Instructions   Blood pressure, LDL-Cholesterol and glucose look stable.    Continue to focus on watching carb portions and exercising regularly.     Consider attending \"Prediabetes class (contact information provided).    Consider seeing the sports medicine provider regarding the left thigh discomfort.     See me in six months, fasting labs a few days in advance.    The information in this document, " created by the medical scribe for me, accurately reflects the services I personally performed and the decisions made by me. I have reviewed and approved this document for accuracy prior to leaving the patient care area.  September 19, 2017 7:29 AM     Du Almaraz MD  [facility]

## 2017-09-19 NOTE — MR AVS SNAPSHOT
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 [address] [phone]              Contact Numbers     [facility] Phone Numbers:  Anticoagulation Clinic Appointments : [phone]  Anticoagulation Nurse: [phone]         September 2017 Details    Sun Mon Tue Wed Thu Fri Sat          1               2                 3               4               5               6               7               8               9                 10               11               12               13               14               15               16                 17               18               19      5 mg   See details      20      2.5 mg         21      5 mg         22      5 mg         23      5 mg           24      2.5 mg         25      5 mg         26      5 mg         27      2.5 mg         28      5 mg         29      5 mg         30      5 mg          Date Details   09/19 This INR check               How to take your warfarin dose     To take:  2.5 mg Take 0.5 of a 5 mg tablet.    To take:  5 mg Take 1 of the 5 mg tablets.           October 2017 Details    Sun Mon Tue Wed Thu Fri  Sat     1      2.5 mg         2      5 mg         3      5 mg         4      2.5 mg         5      5 mg         6      5 mg         7      5 mg           8      2.5 mg         9      5 mg         10      5 mg         11      2.5 mg         12      5 mg         13      5 mg         14      5 mg           15      2.5 mg         16      5 mg         17      5 mg         18      2.5 mg         19      5 mg         20      5 mg         21      5 mg           22      2.5 mg         23      5 mg         24      5 mg         25      2.5 mg         26      5 mg         27      5 mg         28      5 mg           29      2.5 mg         30      5 mg         31                 Date Details   No additional details    Date of next INR:  10/31/2017         How to take your warfarin dose     To take:  2.5 mg Take 0.5 of a 5 mg tablet.    To take:  5 mg Take 1 of the 5 mg tablets.

## 2017-09-19 NOTE — NURSING NOTE
"Chief Complaint   Patient presents with     Lipids     Hypertension       Initial /76 (BP Location: Left arm, Patient Position: Sitting, Cuff Size: Adult Large)  Pulse 74  Temp 98.2  F (36.8  C) (Oral)  Ht 6' 5\" (1.956 m)  Wt 226 lb 12.8 oz (102.9 kg)  SpO2 98%  BMI 26.89 kg/m2 Estimated body mass index is 26.89 kg/(m^2) as calculated from the following:    Height as of this encounter: 6' 5\" (1.956 m).    Weight as of this encounter: 226 lb 12.8 oz (102.9 kg).  Medication Reconciliation: complete   Ace CRUZ      "

## 2017-09-19 NOTE — MR AVS SNAPSHOT
"              After Visit Summary   9/19/2017    Alvin Ontiveros    MRN: [id number]           Patient Information     Date Of Birth          1955        Visit Information        Provider Department      9/19/2017 7:00 AM Du Almaraz MD [facility]        Today's Diagnoses     Prediabetes    -  1    Hyperlipidemia LDL goal <130        Essential hypertension        Heterozygous factor V Leiden mutation (H)        Heterozygous Prothrombin Gene Mutation        Long-term (current) use of anticoagulants [Z79.01]        Pain of left thigh        Gastroesophageal reflux disease without esophagitis          Care Instructions    Blood pressure, LDL-Cholesterol and glucose look stable.    Continue to focus on watching carb portions and exercising regularly.     Consider attending \"Prediabetes class (contact information provided).    Consider seeing the sports medicine provider regarding the left thigh discomfort.     See me in six months, fasting labs a few days in advance.          Follow-ups after your visit        Additional Services     DIABETES EDUCATOR REFERRAL       Your provider has referred you to Diabetes Education: [facility]: Diabetes Education - All [facility] [phone]   [email or web address]    This is a New Diagnosis: Initial group DSMT - 10 hours.    Type of diabetes is Pre-Diabetes - Patient to call [phone] for Pre-Diabetes Class                                                          A1C is: Lab Results       Component                Value               Date                       A1C                      5.7                 09/12/2017            If an urgent visit is needed or A1C is above 12, Care Team to call the diabetes education team at [phone] or send a message to the diabetes education pool (P DIAB ED-PATIENT CARE).    Diabetes education focus: Comprehensive Knowledge Assessment and Instruction      Education needs: " None                                                                                                                                                      Please be aware that coverage of these services is subject to the terms and limitations of your health insurance plan.  Call member services at your health plan to determine Diabetes Self-Management Training benefits and ask which blood glucose monitor brands are covered by your plan.      Please bring the following to your appointment:    -   List of current medications   -   List of blood glucose monitor brands that are covered by your insurance plan  -   Blood glucose monitor and log book  -   Food records for the 3 days prior to your visit            ORTHO  REFERRAL       [facility] is referring you to the Orthopedic  Services at [facility].       The  Representative will assist you in the coordination of your Orthopedic and Musculoskeletal Care as prescribed by your physician.    The  Representative will call you within 1 business day to help schedule your appointment, or you may contact the  Representative at:    All areas ~ [phone]     Type of Referral : Non Surgical       Timeframe requested: Routine    Coverage of these services is subject to the terms and limitations of your health insurance plan.  Please call member services at your health plan with any benefit or coverage questions.      If X-rays, CT or MRI's have been performed, please contact the facility where they were done to arrange for , prior to your scheduled appointment.  Please bring this referral request to your appointment and present it to your specialist.                  Your next 10 appointments already scheduled     Sep 19, 2017  7:30 AM CDT   [facility] Visit with [facility]   [facility]    [address]  [address]  [facility] [address] [phone]              Who to contact     If you have questions or need follow up information about today's clinic visit or your schedule please contact [facility] directly at [phone].  Normal or non-critical lab and imaging results will be communicated to you by MyChart, letter or phone within 4 business days after the clinic has received the results. If you do not hear from us within 7 days, please contact the clinic through [employer] or phone. If you have a critical or abnormal lab result, we will notify you by phone as soon as possible.  Submit refill requests through [employer] or call your pharmacy and they will forward the refill request to us. Please allow 3 business days for your refill to be completed.          Additional Information About Your Visit        [employer] Information     [employer] lets you send messages to your doctor, view your test results, renew your prescriptions, schedule appointments and more. To sign up, go to [email or web address] . Click on \"Log in\" on the left side of the screen, which will take you to the Welcome page. Then click on \"Sign up Now\" on the right side of the page.     You will be asked to enter the access code listed below, as well as some personal information. Please follow the directions to create your username and password.     Your access code is: [email or web address]  Expires: 2017  7:22 AM     Your access code will  in 90 days. If you need help or a new code, please call your [facility] or [phone].        Care EveryWhere ID     This is your Care EveryWhere ID. This could be used by other organizations to access your [facility] medical records  [id number]        Your Vitals Were     Pulse Temperature Height Pulse Oximetry BMI (Body Mass Index)       74 98.2  F (36.8  C) (Oral) 6' 5\" (1.956 m) 98% 26.89 kg/m2        Blood Pressure from Last 3 Encounters:   17 136/76   17 136/74   05/10/16 " 138/78    Weight from Last 3 Encounters:   09/19/17 226 lb 12.8 oz (102.9 kg)   01/17/17 223 lb (101.2 kg)   05/10/16 219 lb (99.3 kg)              We Performed the Following     DIABETES EDUCATOR REFERRAL     ORTHO  REFERRAL          Today's Medication Changes          These changes are accurate as of: 9/19/17  7:22 AM.  If you have any questions, ask your nurse or doctor.               These medicines have changed or have updated prescriptions.        Dose/Directions    omeprazole 20 MG CR capsule   Commonly known as:  priLOSEC   This may have changed:  See the new instructions.   Used for:  Gastroesophageal reflux disease without esophagitis   Changed by:  Du Almaraz MD        Dose:  20 mg   Take 1 capsule (20 mg) by mouth daily   Quantity:  90 capsule   Refills:  3            Where to get your medicines      These medications were sent to [facility] - [address] [address]     Phone:  [phone]     folic acid 1 MG tablet    hydrochlorothiazide 25 MG tablet    omeprazole 20 MG CR capsule    pravastatin 40 MG tablet    valsartan 160 MG tablet                Primary Care Provider Office Phone # Fax #    Du Almaraz [phone]       [address] [place] [address]        Equal Access to Services     TENZIN SMITH AH: Hadii mahad ku hadasho Soomaali, waaxda luqadaha, qaybta kaalmada adeegyada, waxay idiin hayandrewsn jose f zapata. So [facility] [phone].    ATENCIÓN: Si habla español, tiene a mcneal disposición servicios gratuitos de asistencia lingüística. Llame al [phone].    We comply with applicable federal civil rights laws and [place] laws. We do not discriminate on the basis of race, color, national origin, age, disability sex, sexual orientation or gender identity.            Thank you!     Thank you for choosing [facility]  for your care. Our goal is always to  provide you with excellent care. Hearing back from our patients is one way we can continue to improve our services. Please take a few minutes to complete the written survey that you may receive in the mail after your visit with us. Thank you!             Your Updated Medication List - Protect others around you: Learn how to safely use, store and throw away your medicines at www.disposemymeds.org.          This list is accurate as of: 9/19/17  7:22 AM.  Always use your most recent med list.                   Brand Name Dispense Instructions for use Diagnosis    folic acid 1 MG tablet    FOLVITE    100 tablet    Take 1 tablet (1,000 mcg) by mouth daily    Prediabetes       hydrochlorothiazide 25 MG tablet    HYDRODIURIL    90 tablet    Take 1 tablet (25 mg) by mouth daily    Essential hypertension       omeprazole 20 MG CR capsule    priLOSEC    90 capsule    Take 1 capsule (20 mg) by mouth daily    Gastroesophageal reflux disease without esophagitis       pravastatin 40 MG tablet    PRAVACHOL    90 tablet    Take 1 tablet (40 mg) by mouth daily at bedtime.    Hyperlipidemia LDL goal <130       valsartan 160 MG tablet    DIOVAN    90 tablet    Take 1 tablet (160 mg) by mouth daily    Essential hypertension       warfarin 5 MG tablet    COUMADIN    90 tablet    TAKE 1 TABLET DAILY EXCEPT TAKE ONE-HALF (1/2) TABLET ON WEDNESDAY AND SUNDAY OR AS INSTRUCTED BASED ON INR RESULT    Heterozygous factor V Leiden mutation (H)

## 2017-10-31 ENCOUNTER — ANTICOAGULATION THERAPY VISIT (OUTPATIENT)
Dept: ANTICOAGULATION | Facility: CLINIC | Age: 62
End: 2017-10-31
Payer: COMMERCIAL

## 2017-10-31 DIAGNOSIS — D68.52 PROTHROMBIN GENE MUTATION (H): ICD-10-CM

## 2017-10-31 DIAGNOSIS — D68.51 HETEROZYGOUS FACTOR V LEIDEN MUTATION (H): ICD-10-CM

## 2017-10-31 DIAGNOSIS — Z79.01 LONG-TERM (CURRENT) USE OF ANTICOAGULANTS: ICD-10-CM

## 2017-10-31 DIAGNOSIS — Z86.718 PERSONAL HISTORY OF VENOUS THROMBOSIS AND EMBOLISM: ICD-10-CM

## 2017-10-31 LAB — INR POINT OF CARE: 2.7 (ref 0.86–1.14)

## 2017-10-31 PROCEDURE — 85610 PROTHROMBIN TIME: CPT | Mod: QW

## 2017-10-31 PROCEDURE — 99207 ZZC NO CHARGE NURSE ONLY: CPT

## 2017-10-31 PROCEDURE — 36416 COLLJ CAPILLARY BLOOD SPEC: CPT

## 2017-10-31 NOTE — PROGRESS NOTES
ANTICOAGULATION FOLLOW-UP CLINIC VISIT    Patient Name:  Alvin Ontiveros  Date:  10/31/2017  Contact Type:  Face to Face    SUBJECTIVE:     Patient Findings     Positives No Problem Findings           OBJECTIVE    INR Protime   Date Value Ref Range Status   10/31/2017 2.7 (A) 0.86 - 1.14 Final       ASSESSMENT / PLAN  INR assessment THER    Recheck INR In: 6 WEEKS    INR Location Clinic      Anticoagulation Summary as of 10/31/2017     INR goal 2.0-3.0   Today's INR 2.7   Maintenance plan 2.5 mg (5 mg x 0.5) on Sun, Wed; 5 mg (5 mg x 1) all other days   Full instructions 2.5 mg on Sun, Wed; 5 mg all other days   Weekly total 30 mg   No change documented Luna Oakley RN   Plan last modified Luna Oakley RN (4/11/2016)   Next INR check 12/12/2017   Priority INR   Target end date     Indications   Long-term (current) use of anticoagulants [Z79.01] [Z79.01]  Heterozygous factor V Leiden mutation (H) [D68.51]  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]  Personal history of venous thrombosis and embolism [Z86.718]         Anticoagulation Episode Summary     INR check location     Preferred lab     Send INR reminders to [facility]    Comments Takes in AM      Anticoagulation Care Providers     Provider Role Specialty Phone number    Du Almaraz MD [facility] Internal Medicine [phone]            See the Encounter Report to view Anticoagulation Flowsheet and Dosing Calendar (Go to Encounters tab in chart review, and find the Anticoagulation Therapy Visit)    Dosage adjustment made based on physician directed care plan.    Luna Oakley RN

## 2017-10-31 NOTE — MR AVS SNAPSHOT
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]  Personal history of venous thrombosis and embolism [Z86.718]         Your next Anticoagulation Clinic appointment(s)     Dec 12, 2017  4:30 PM CST   Anticoagulation Visit with [facility]   [facility]    [address] [facility] [address]  [facility] [address] [phone]              Contact Numbers     [facility] Phone Numbers:  Anticoagulation Clinic Appointments : [phone]  Anticoagulation Nurse: [phone]         October 2017 Details    Sun Mon Tue Wed Thu Fri Sat     1               2               3               4               5               6               7                 8               9               10               11               12               13               14                 15               16               17               18               19               20               21                 22               23               24               25               26               27               28                 29               30               31      5 mg   See details           Date Details   10/31 This INR check               How to take your warfarin dose     To take:  5 mg Take 1 of the 5 mg tablets.           November 2017 Details    Sun Mon Tue Wed Thu Fri Sat        1      2.5 mg         2      5 mg         3      5 mg         4      5  mg           5      2.5 mg         6      5 mg         7      5 mg         8      2.5 mg         9      5 mg         10      5 mg         11      5 mg           12      2.5 mg         13      5 mg         14      5 mg         15      2.5 mg         16      5 mg         17      5 mg         18      5 mg           19      2.5 mg         20      5 mg         21      5 mg         22      2.5 mg         23      5 mg         24      5 mg         25      5 mg           26      2.5 mg         27      5 mg         28      5 mg         29      2.5 mg         30      5 mg            Date Details   No additional details            How to take your warfarin dose     To take:  2.5 mg Take 0.5 of a 5 mg tablet.    To take:  5 mg Take 1 of the 5 mg tablets.           December 2017 Details    Sun Mon Tue Wed Thu Fri Sat          1      5 mg         2      5 mg           3      2.5 mg         4      5 mg         5      5 mg         6      2.5 mg         7      5 mg         8      5 mg         9      5 mg           10      2.5 mg         11      5 mg         12            13               14               15               16                 17               18               19               20               21               22               23                 24               25               26               27               28               29               30                 31                      Date Details   No additional details    Date of next INR:  12/12/2017         How to take your warfarin dose     To take:  2.5 mg Take 0.5 of a 5 mg tablet.    To take:  5 mg Take 1 of the 5 mg tablets.

## 2017-12-12 ENCOUNTER — ANTICOAGULATION THERAPY VISIT (OUTPATIENT)
Dept: ANTICOAGULATION | Facility: CLINIC | Age: 62
End: 2017-12-12
Payer: COMMERCIAL

## 2017-12-12 DIAGNOSIS — D68.52 PROTHROMBIN GENE MUTATION (H): ICD-10-CM

## 2017-12-12 DIAGNOSIS — D68.51 HETEROZYGOUS FACTOR V LEIDEN MUTATION (H): ICD-10-CM

## 2017-12-12 DIAGNOSIS — Z86.718 PERSONAL HISTORY OF VENOUS THROMBOSIS AND EMBOLISM: ICD-10-CM

## 2017-12-12 DIAGNOSIS — Z79.01 LONG-TERM (CURRENT) USE OF ANTICOAGULANTS: ICD-10-CM

## 2017-12-12 LAB — INR POINT OF CARE: 3.3 (ref 0.86–1.14)

## 2017-12-12 PROCEDURE — 99207 ZZC NO CHARGE NURSE ONLY: CPT

## 2017-12-12 PROCEDURE — 36416 COLLJ CAPILLARY BLOOD SPEC: CPT

## 2017-12-12 PROCEDURE — 85610 PROTHROMBIN TIME: CPT | Mod: QW

## 2017-12-12 NOTE — PROGRESS NOTES
ANTICOAGULATION FOLLOW-UP CLINIC VISIT    Patient Name:  Alvin Ontiveros  Date:  12/12/2017  Contact Type:  Face to Face    SUBJECTIVE:     Patient Findings     Comments Change in diet/appetite - may not be eating as many green veggies.  Already took his coumadin this morning.           OBJECTIVE    INR Protime   Date Value Ref Range Status   12/12/2017 3.3 (A) 0.86 - 1.14 Final       ASSESSMENT / PLAN  INR assessment SUPRA    Recheck INR In: 3 WEEKS    INR Location Clinic      Anticoagulation Summary as of 12/12/2017     INR goal 2.0-3.0   Today's INR 3.3!   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]  Personal history of venous thrombosis and embolism [Z86.718]         Anticoagulation Episode Summary     INR check location     Preferred lab     Send INR reminders to [facility]    Comments Takes in AM      Anticoagulation Care Providers     Provider Role Specialty Phone number    Du Almaraz MD [facility] Internal Medicine [phone]            See the Encounter Report to view Anticoagulation Flowsheet and Dosing Calendar (Go to Encounters tab in chart review, and find the Anticoagulation Therapy Visit)    Dosage adjustment made based on physician directed care plan.    Kari Lizarraga RN

## 2017-12-12 NOTE — MR AVS SNAPSHOT
Alvin Ontiveros   12/12/2017 4:30 PM   Anticoagulation Therapy Visit    Description:  62 year old male   Provider[facility]  [facility]   Department:  [facility]           INR as of 12/12/2017     Today's INR 3.3!      Anticoagulation Summary as of 12/12/2017     INR goal 2.0-3.0   Today's INR 3.3!   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]  Personal history of venous thrombosis and embolism [Z86.718]         Your next Anticoagulation Clinic appointment(s)     Dec 12, 2017  4:30 PM CST   Anticoagulation Visit with [facility]   [facility]    [address] [facility] [address]  [facility] [id number] [phone]            Jan 02, 2018  4:15 PM CST   Anticoagulation Visit with [facility]   [facility]    [address] [facility] [address]  [facility] [id number] [phone]              Contact Numbers     [facility] Phone Numbers:  Anticoagulation Clinic Appointments : [phone]  Anticoagulation Nurse: [phone]         December 2017 Details    Sun Mon Tue Wed Thu Fri Sat          1               2                 3               4               5               6               7               8               9                 10               11               12      5 mg   See details      13      Hold         14      5 mg         15      5 mg         16      5 mg           17      2.5 mg         18      5 mg         19      5 mg         20      2.5 mg         21      5 mg         22      5 mg         23      5 mg           24      2.5 mg         25      5 mg         26      5 mg         27      2.5 mg         28      5 mg          29      5 mg         30      5 mg           31      2.5 mg                Date Details   12/12 This INR check               How to take your warfarin dose     To take:  2.5 mg Take 0.5 of a 5 mg tablet.    To take:  5 mg Take 1 of the 5 mg tablets.    Hold Do not take your warfarin dose. See the Details table to the right for additional instructions.                January 2018 Details    Sun Mon Tue Wed Thu Fri Sat      1      5 mg         2            3               4               5               6                 7               8               9               10               11               12               13                 14               15               16               17               18               19               20                 21               22               23               24               25               26               27                 28               29               30               31                   Date Details   No additional details    Date of next INR:  1/2/2018         How to take your warfarin dose     To take:  5 mg Take 1 of the 5 mg tablets.

## 2018-01-02 ENCOUNTER — ANTICOAGULATION THERAPY VISIT (OUTPATIENT)
Dept: ANTICOAGULATION | Facility: CLINIC | Age: 63
End: 2018-01-02
Payer: COMMERCIAL

## 2018-01-02 DIAGNOSIS — D68.52 PROTHROMBIN GENE MUTATION (H): ICD-10-CM

## 2018-01-02 DIAGNOSIS — Z79.01 LONG-TERM (CURRENT) USE OF ANTICOAGULANTS: ICD-10-CM

## 2018-01-02 DIAGNOSIS — Z86.718 PERSONAL HISTORY OF VENOUS THROMBOSIS AND EMBOLISM: ICD-10-CM

## 2018-01-02 DIAGNOSIS — D68.51 HETEROZYGOUS FACTOR V LEIDEN MUTATION (H): ICD-10-CM

## 2018-01-02 LAB — INR POINT OF CARE: 3 (ref 0.86–1.14)

## 2018-01-02 PROCEDURE — 85610 PROTHROMBIN TIME: CPT | Mod: QW

## 2018-01-02 PROCEDURE — 99207 ZZC NO CHARGE NURSE ONLY: CPT

## 2018-01-02 PROCEDURE — 36416 COLLJ CAPILLARY BLOOD SPEC: CPT

## 2018-01-02 NOTE — MR AVS SNAPSHOT
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]  Personal history of venous thrombosis and embolism [Z86.718]         Your next Anticoagulation Clinic appointment(s)     Feb 12, 2018  4:30 PM CST   [facility] with [facility]   [facility]    [address] [facility] [address]  [facility] [id number] [phone]              Contact Numbers     [facility] Phone Numbers:  Anticoagulation Clinic Appointments : [phone]  Anticoagulation Nurse: [phone]         January 2018 Details    Sun Mon Tue Wed Thu Fri Sat      1               2      5 mg   See details      3      2.5 mg         4      5 mg         5      5 mg         6      5 mg           7      2.5 mg         8      5 mg         9      5 mg         10      2.5 mg         11      5 mg         12      5 mg         13      5 mg           14      2.5 mg         15      5 mg         16      5 mg         17      2.5 mg         18      5 mg         19      5 mg         20      5 mg           21      2.5 mg         22      5 mg         23      5 mg         24      2.5 mg         25      5 mg         26      5 mg         27      5 mg           28      2.5 mg         29      5 mg         30      5 mg         31      2.5 mg             Date Details   01/02 This INR check               How to take your warfarin dose     To take:  2.5 mg Take 0.5 of a 5 mg tablet.    To take:   5 mg Take 1 of the 5 mg tablets.           February 2018 Details    Sun Mon Tue Wed Thu Fri Sat         1      5 mg         2      5 mg         3      5 mg           4      2.5 mg         5      5 mg         6      5 mg         7      2.5 mg         8      5 mg         9      5 mg         10      5 mg           11      2.5 mg         12      5 mg         13            14               15               16               17                 18               19               20               21               22               23               24                 25               26               27               28                   Date Details   No additional details    Date of next INR:  2/13/2018         How to take your warfarin dose     To take:  2.5 mg Take 0.5 of a 5 mg tablet.    To take:  5 mg Take 1 of the 5 mg tablets.

## 2018-01-02 NOTE — PROGRESS NOTES
ANTICOAGULATION FOLLOW-UP CLINIC VISIT    Patient Name:  Alvin Ontiveros  Date:  1/2/2018  Contact Type:  Face to Face    SUBJECTIVE:     Patient Findings     Positives No Problem Findings           OBJECTIVE    INR Protime   Date Value Ref Range Status   01/02/2018 3.0 (A) 0.86 - 1.14 Final       ASSESSMENT / PLAN  INR assessment THER    Recheck INR In: 6 WEEKS    INR Location Clinic      Anticoagulation Summary as of 1/2/2018     INR goal 2.0-3.0   Today's INR 3.0   Maintenance plan 2.5 mg (5 mg x 0.5) on Sun, Wed; 5 mg (5 mg x 1) all other days   Full instructions 2.5 mg on Sun, Wed; 5 mg all other days   Weekly total 30 mg   No change documented Luna Oakley RN   Plan last modified Luna Oakley RN (4/11/2016)   Next INR check 2/13/2018   Priority INR   Target end date     Indications   Long-term (current) use of anticoagulants [Z79.01] [Z79.01]  Heterozygous factor V Leiden mutation (H) [D68.51]  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    Luna Oakley RN

## 2018-01-21 DIAGNOSIS — D68.51 HETEROZYGOUS FACTOR V LEIDEN MUTATION (H): ICD-10-CM

## 2018-01-22 RX ORDER — WARFARIN SODIUM 5 MG/1
TABLET ORAL
Qty: 90 TABLET | Refills: 1 | Status: SHIPPED | OUTPATIENT
Start: 2018-01-22 | End: 2018-07-20

## 2018-01-23 NOTE — TELEPHONE ENCOUNTER
"Requested Prescriptions   Pending Prescriptions Disp Refills     warfarin (COUMADIN) 5 MG tablet [Pharmacy Med Name: WARFARIN TABS 5MG] 90 tablet 1     Sig: TAKE 1 TABLET DAILY EXCEPT TAKE ONE-HALF (1/2) TABLET ON WEDNESDAY AND SUNDAY OR AS INSTRUCTED BASED ON INR RESULT    Vitamin K Antagonists Failed    1/21/2018 11:59 PM       Failed - INR is within goal in the past 6 weeks    Confirm INR is within goal in the past 6 weeks.     Recent Labs   Lab Test 01/02/18   INR  3.0*                      Passed - Recent or future visit with authorizing provider's specialty    Patient had office visit in the last year or has a visit in the next 30 days with authorizing provider.  See \"Patient Info\" tab in inbasket, or \"Choose Columns\" in Meds & Orders section of the refill encounter.            Passed - Patient is 18 years of age or older        Warfarin 5 mg      Last Written Prescription Date:  7/26/17  Last Fill Quantity: 90,   # refills: 1  Last Office Visit: 9/19/17  Future Office visit:     Prescription approved per [facility] Refill Protocol.  Luna Oakley RN    "

## 2018-02-12 ENCOUNTER — ANTICOAGULATION THERAPY VISIT (OUTPATIENT)
Dept: ANTICOAGULATION | Facility: CLINIC | Age: 63
End: 2018-02-12
Payer: COMMERCIAL

## 2018-02-12 DIAGNOSIS — Z86.718 PERSONAL HISTORY OF VENOUS THROMBOSIS AND EMBOLISM: ICD-10-CM

## 2018-02-12 DIAGNOSIS — D68.51 HETEROZYGOUS FACTOR V LEIDEN MUTATION (H): ICD-10-CM

## 2018-02-12 DIAGNOSIS — Z79.01 LONG-TERM (CURRENT) USE OF ANTICOAGULANTS: ICD-10-CM

## 2018-02-12 DIAGNOSIS — D68.52 PROTHROMBIN GENE MUTATION (H): ICD-10-CM

## 2018-02-12 LAB — INR POINT OF CARE: 3 (ref 0.86–1.14)

## 2018-02-12 PROCEDURE — 36416 COLLJ CAPILLARY BLOOD SPEC: CPT

## 2018-02-12 PROCEDURE — 99207 ZZC NO CHARGE NURSE ONLY: CPT

## 2018-02-12 PROCEDURE — 85610 PROTHROMBIN TIME: CPT | Mod: QW

## 2018-02-12 NOTE — MR AVS SNAPSHOT
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]  Personal history of venous thrombosis and embolism [Z86.718]         Your next Anticoagulation Clinic appointment(s)     Mar 26, 2018  4:30 PM CDT   [facility] Visit with [facility]   [facility]    [address] [facility] [address]  [facility] [address] [phone]              Contact Numbers     [facility] Phone Numbers:  Anticoagulation Clinic Appointments : [phone]  Anticoagulation Nurse: [phone]         February 2018 Details    Sun Mon Tue Wed Thu Fri Sat         1               2               3                 4               5               6               7               8               9               10                 11               12      5 mg   See details      13      5 mg         14      2.5 mg         15      5 mg         16      5 mg         17      5 mg           18      2.5 mg         19      5 mg         20      5 mg         21      2.5 mg         22      5 mg         23      5 mg         24      5 mg           25      2.5 mg         26      5 mg         27      5 mg         28      2.5 mg             Date Details   02/12 This INR check               How to take your warfarin dose     To take:  2.5 mg Take 0.5 of a 5 mg tablet.    To take:  5 mg Take 1 of the 5 mg tablets.           March 2018 Details    Sun Mon Tue Wed Thu Fri Sat         1       5 mg         2      5 mg         3      5 mg           4      2.5 mg         5      5 mg         6      5 mg         7      2.5 mg         8      5 mg         9      5 mg         10      5 mg           11      2.5 mg         12      5 mg         13      5 mg         14      2.5 mg         15      5 mg         16      5 mg         17      5 mg           18      2.5 mg         19      5 mg         20      5 mg         21      2.5 mg         22      5 mg         23      5 mg         24      5 mg           25      2.5 mg         26            27               28               29               30               31                Date Details   No additional details    Date of next INR:  3/26/2018         How to take your warfarin dose     To take:  2.5 mg Take 0.5 of a 5 mg tablet.    To take:  5 mg Take 1 of the 5 mg tablets.

## 2018-02-12 NOTE — PROGRESS NOTES
ANTICOAGULATION FOLLOW-UP CLINIC VISIT    Patient Name:  Alvin Ontiveros  Date:  2/12/2018  Contact Type:  Face to Face    SUBJECTIVE:     Patient Findings     Positives No Problem Findings           OBJECTIVE    INR Protime   Date Value Ref Range Status   02/12/2018 3.0 (A) 0.86 - 1.14 Final       ASSESSMENT / PLAN  INR assessment THER    Recheck INR In: 6 WEEKS    INR Location Clinic      Anticoagulation Summary as of 2/12/2018     INR goal 2.0-3.0   Today's INR 3.0   Maintenance plan 2.5 mg (5 mg x 0.5) on Sun, Wed; 5 mg (5 mg x 1) all other days   Full instructions 2.5 mg on Sun, Wed; 5 mg all other days   Weekly total 30 mg   No change documented Luna Oakley RN   Plan last modified Luna Oakley RN (4/11/2016)   Next INR check 3/26/2018   Priority INR   Target end date     Indications   Long-term (current) use of anticoagulants [Z79.01] [Z79.01]  Heterozygous factor V Leiden mutation (H) [D68.51]  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]  Personal history of venous thrombosis and embolism [Z86.718]         Anticoagulation Episode Summary     INR check location     Preferred lab     Send INR reminders to [facility]    Comments Takes in AM      Anticoagulation Care Providers     Provider Role Specialty Phone number    Du Almaraz MD [facility] Internal Medicine [phone]            See the Encounter Report to view Anticoagulation Flowsheet and Dosing Calendar (Go to Encounters tab in chart review, and find the Anticoagulation Therapy Visit)    Dosage adjustment made based on physician directed care plan.    Luna Oakley RN

## 2018-03-26 ENCOUNTER — ANTICOAGULATION THERAPY VISIT (OUTPATIENT)
Dept: ANTICOAGULATION | Facility: CLINIC | Age: 63
End: 2018-03-26
Payer: COMMERCIAL

## 2018-03-26 DIAGNOSIS — D68.51 HETEROZYGOUS FACTOR V LEIDEN MUTATION (H): ICD-10-CM

## 2018-03-26 DIAGNOSIS — D68.52 PROTHROMBIN GENE MUTATION (H): ICD-10-CM

## 2018-03-26 DIAGNOSIS — Z86.718 PERSONAL HISTORY OF VENOUS THROMBOSIS AND EMBOLISM: ICD-10-CM

## 2018-03-26 DIAGNOSIS — Z79.01 LONG-TERM (CURRENT) USE OF ANTICOAGULANTS: ICD-10-CM

## 2018-03-26 LAB — INR POINT OF CARE: 3.5 (ref 0.86–1.14)

## 2018-03-26 PROCEDURE — 36416 COLLJ CAPILLARY BLOOD SPEC: CPT

## 2018-03-26 PROCEDURE — 99207 ZZC NO CHARGE NURSE ONLY: CPT

## 2018-03-26 PROCEDURE — 85610 PROTHROMBIN TIME: CPT | Mod: QW

## 2018-03-26 NOTE — PROGRESS NOTES
ANTICOAGULATION FOLLOW-UP CLINIC VISIT    Patient Name:  Alvin Ontiveros  Date:  3/26/2018  Contact Type:  Face to Face    SUBJECTIVE:     Patient Findings     Positives Change in diet/appetite (Pt reports not eating many greens, will resume usual diet. ), Unexplained INR or factor level change           OBJECTIVE    INR Protime   Date Value Ref Range Status   03/26/2018 3.5 (A) 0.86 - 1.14 Final       ASSESSMENT / PLAN  INR assessment SUPRA    Recheck INR In: 3 WEEKS    INR Location Clinic      Anticoagulation Summary as of 3/26/2018     INR goal 2.0-3.0   Today's INR 3.5!   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]  Personal history of venous thrombosis and embolism [Z86.718]         Anticoagulation Episode Summary     INR check location     Preferred lab     Send INR reminders to [facility]    Comments Takes in AM      Anticoagulation Care Providers     Provider Role Specialty Phone number    Du Almaraz MD [facility] Internal Medicine [phone]            See the Encounter Report to view Anticoagulation Flowsheet and Dosing Calendar (Go to Encounters tab in chart review, and find the Anticoagulation Therapy Visit)    Dosage adjustment made based on physician directed care plan.    Luna Oakley, RN

## 2018-03-26 NOTE — MR AVS SNAPSHOT
Alvin Ontiveros   3/26/2018 4:30 PM   Anticoagulation Therapy Visit    Description:  62 year old male   Provider[facility]  [facility]   Department:  [facility]           INR as of 3/26/2018     Today's INR 3.5!      Anticoagulation Summary as of 3/26/2018     INR goal 2.0-3.0   Today's INR 3.5!   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]  Personal history of venous thrombosis and embolism [Z86.718]         Your next Anticoagulation Clinic appointment(s)     Apr 16, 2018  4:30 PM CDT   [facility] with [facility]   [facility]    [address] [facility] [address]  [facility] [address] [phone]              Contact Numbers     [facility] Phone Numbers:  Anticoagulation Clinic Appointments : [phone]  Anticoagulation Nurse: [phone]         March 2018 Details    Sun Mon Tue Wed Thu Fri Sat         1               2               3                 4               5               6               7               8               9               10                 11               12               13               14               15               16               17                 18               19               20               21               22               23               24                 25               26      5 mg   See details      27      2.5 mg         28      2.5 mg         29      5 mg         30      5 mg         31      5 mg          Date Details   03/26 This INR check               How to take your warfarin dose     To take:  2.5 mg Take 0.5 of a 5 mg tablet.    To take:  5 mg Take 1 of the 5 mg tablets.           April 2018  Details    Sun Mon Tue Wed Thu Fri Sat     1      2.5 mg         2      5 mg         3      5 mg         4      2.5 mg         5      5 mg         6      2.5 mg         7      5 mg           8      2.5 mg         9      5 mg         10      5 mg         11      2.5 mg         12      5 mg         13      2.5 mg         14      5 mg           15      2.5 mg         16            17               18               19               20               21                 22               23               24               25               26               27               28                 29               30                     Date Details   No additional details    Date of next INR:  4/16/2018         How to take your warfarin dose     To take:  2.5 mg Take 0.5 of a 5 mg tablet.    To take:  5 mg Take 1 of the 5 mg tablets.

## 2018-04-05 ENCOUNTER — TELEPHONE (OUTPATIENT)
Dept: INTERNAL MEDICINE | Facility: CLINIC | Age: 63
End: 2018-04-05

## 2018-04-05 DIAGNOSIS — R73.03 PREDIABETES: Primary | ICD-10-CM

## 2018-04-05 NOTE — TELEPHONE ENCOUNTER
Pt calling.  Has a future appt scheduled.  But is coming for labs prior.  Needs orders entered.    Future A1C ordered in chart.

## 2018-04-16 ENCOUNTER — ANTICOAGULATION THERAPY VISIT (OUTPATIENT)
Dept: ANTICOAGULATION | Facility: CLINIC | Age: 63
End: 2018-04-16
Payer: COMMERCIAL

## 2018-04-16 DIAGNOSIS — Z79.01 LONG-TERM (CURRENT) USE OF ANTICOAGULANTS: ICD-10-CM

## 2018-04-16 DIAGNOSIS — Z86.718 PERSONAL HISTORY OF VENOUS THROMBOSIS AND EMBOLISM: ICD-10-CM

## 2018-04-16 DIAGNOSIS — D68.52 PROTHROMBIN GENE MUTATION (H): ICD-10-CM

## 2018-04-16 DIAGNOSIS — D68.51 HETEROZYGOUS FACTOR V LEIDEN MUTATION (H): ICD-10-CM

## 2018-04-16 LAB — INR POINT OF CARE: 2.2 (ref 0.86–1.14)

## 2018-04-16 PROCEDURE — 99207 ZZC NO CHARGE NURSE ONLY: CPT

## 2018-04-16 PROCEDURE — 36416 COLLJ CAPILLARY BLOOD SPEC: CPT

## 2018-04-16 PROCEDURE — 85610 PROTHROMBIN TIME: CPT | Mod: QW

## 2018-04-16 NOTE — PROGRESS NOTES
ANTICOAGULATION FOLLOW-UP CLINIC VISIT    Patient Name:  Alvin Ontiveros  Date:  4/16/2018  Contact Type:  Face to Face    SUBJECTIVE:     Patient Findings     Positives No Problem Findings           OBJECTIVE    INR Protime   Date Value Ref Range Status   04/16/2018 2.2 (A) 0.86 - 1.14 Final       ASSESSMENT / PLAN  INR assessment THER    Recheck INR In: 5 WEEKS    INR Location Clinic      Anticoagulation Summary as of 4/16/2018     INR goal 2.0-3.0   Today's INR 2.2   Maintenance plan 2.5 mg (5 mg x 0.5) on Sun, Wed; 5 mg (5 mg x 1) all other days   Full instructions 4/20: 2.5 mg; 4/27: 2.5 mg; 5/4: 2.5 mg; 5/11: 2.5 mg; 5/18: 2.5 mg; Otherwise 2.5 mg on Sun, Wed; 5 mg all other days   Weekly total 30 mg   Plan last modified Luna Oakley RN (4/11/2016)   Next INR check 5/21/2018   Priority INR   Target end date     Indications   Long-term (current) use of anticoagulants [Z79.01] [Z79.01]  Heterozygous factor V Leiden mutation (H) [D68.51]  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]  Personal history of venous thrombosis and embolism [Z86.718]         Anticoagulation Episode Summary     INR check location     Preferred lab     Send INR reminders to [facility]    Comments Takes in AM      Anticoagulation Care Providers     Provider Role Specialty Phone number    Du Almaraz MD [facility] Internal Medicine [phone]            See the Encounter Report to view Anticoagulation Flowsheet and Dosing Calendar (Go to Encounters tab in chart review, and find the Anticoagulation Therapy Visit)    Dosage adjustment made based on physician directed care plan.    Luna Oakley RN

## 2018-04-16 NOTE — MR AVS SNAPSHOT
Alvin Ontiveros   4/16/2018 4:30 PM   Anticoagulation Therapy Visit    Description:  62 year old male   Provider[facility]   Department:  [facility]           INR as of 4/16/2018     Today's INR 2.2      Anticoagulation Summary as of 4/16/2018     INR goal 2.0-3.0   Today's INR 2.2   Full instructions 4/20: 2.5 mg; 4/27: 2.5 mg; 5/4: 2.5 mg; 5/11: 2.5 mg; 5/18: 2.5 mg; Otherwise 2.5 mg on Sun, Wed; 5 mg all other days   Next INR check 5/21/2018    Indications   Long-term (current) use of anticoagulants [Z79.01] [Z79.01]  Heterozygous factor V Leiden mutation (H) [D68.51]  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]  Personal history of venous thrombosis and embolism [Z86.718]         Your next Anticoagulation Clinic appointment(s)     May 21, 2018  4:30 PM CDT   [facility] with [facility]   [facility]    [address] [facility] [address]  [facility] [id number] [phone]              Contact Numbers     [facility] Phone Numbers:  Anticoagulation Clinic Appointments : [phone]  Anticoagulation Nurse: [phone]         April 2018 Details    Sun Mon Tue Wed Thu Fri Sat     1               2               3               4               5               6               7                 8               9               10               11               12               13               14                 15               16      5 mg   See details      17      5 mg         18      2.5 mg         19      5 mg         20      2.5 mg         21      5 mg           22      2.5 mg         23      5 mg         24      5 mg         25      2.5 mg         26      5 mg         27      2.5 mg         28      5 mg           29      2.5 mg         30      5 mg               Date Details   04/16 This INR check               How to take your warfarin dose     To take:  2.5 mg Take 0.5 of a 5 mg tablet.    To take:  5  mg Take 1 of the 5 mg tablets.           May 2018 Details    Sun Mon Tue Wed Thu Fri Sat       1      5 mg         2      2.5 mg         3      5 mg         4      2.5 mg         5      5 mg           6      2.5 mg         7      5 mg         8      5 mg         9      2.5 mg         10      5 mg         11      2.5 mg         12      5 mg           13      2.5 mg         14      5 mg         15      5 mg         16      2.5 mg         17      5 mg         18      2.5 mg         19      5 mg           20      2.5 mg         21            22               23               24               25               26                 27               28               29               30               31                  Date Details   No additional details    Date of next INR:  5/21/2018         How to take your warfarin dose     To take:  2.5 mg Take 0.5 of a 5 mg tablet.    To take:  5 mg Take 1 of the 5 mg tablets.

## 2018-04-25 ENCOUNTER — DOCUMENTATION ONLY (OUTPATIENT)
Dept: LAB | Facility: CLINIC | Age: 63
End: 2018-04-25

## 2018-04-25 DIAGNOSIS — E78.5 HYPERLIPIDEMIA LDL GOAL <130: Primary | ICD-10-CM

## 2018-05-21 ENCOUNTER — ANTICOAGULATION THERAPY VISIT (OUTPATIENT)
Dept: ANTICOAGULATION | Facility: CLINIC | Age: 63
End: 2018-05-21
Payer: COMMERCIAL

## 2018-05-21 DIAGNOSIS — D68.51 HETEROZYGOUS FACTOR V LEIDEN MUTATION (H): ICD-10-CM

## 2018-05-21 DIAGNOSIS — Z79.01 LONG-TERM (CURRENT) USE OF ANTICOAGULANTS: ICD-10-CM

## 2018-05-21 DIAGNOSIS — Z86.718 PERSONAL HISTORY OF VENOUS THROMBOSIS AND EMBOLISM: ICD-10-CM

## 2018-05-21 DIAGNOSIS — D68.52 PROTHROMBIN GENE MUTATION (H): ICD-10-CM

## 2018-05-21 LAB — INR POINT OF CARE: 2.1 (ref 0.86–1.14)

## 2018-05-21 PROCEDURE — 85610 PROTHROMBIN TIME: CPT | Mod: QW

## 2018-05-21 PROCEDURE — 99207 ZZC NO CHARGE NURSE ONLY: CPT

## 2018-05-21 PROCEDURE — 36416 COLLJ CAPILLARY BLOOD SPEC: CPT

## 2018-05-21 NOTE — PROGRESS NOTES
ANTICOAGULATION FOLLOW-UP CLINIC VISIT    Patient Name:  Alvin Ontiveros  Date:  5/21/2018  Contact Type:  Face to Face    SUBJECTIVE:     Patient Findings     Positives No Problem Findings           OBJECTIVE    INR Protime   Date Value Ref Range Status   05/21/2018 2.1 (A) 0.86 - 1.14 Final       ASSESSMENT / PLAN  INR assessment THER    Recheck INR In: 5 WEEKS    INR Location Clinic      Anticoagulation Summary as of 5/21/2018     INR goal 2.0-3.0   Today's INR 2.1   Maintenance plan 2.5 mg (5 mg x 0.5) on Sun, Wed, Fri; 5 mg (5 mg x 1) all other days   Full instructions 2.5 mg on Sun, Wed, Fri; 5 mg all other days   Weekly total 27.5 mg   Plan last modified Kari Lizarraga RN (5/21/2018)   Next INR check    Priority INR   Target end date     Indications   Long-term (current) use of anticoagulants [Z79.01] [Z79.01]  Heterozygous factor V Leiden mutation (H) [D68.51]  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    Kari Lizarraga RN

## 2018-05-21 NOTE — MR AVS SNAPSHOT
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]  Personal history of venous thrombosis and embolism [Z86.718]         Your next Anticoagulation Clinic appointment(s)     May 21, 2018  4:30 PM CDT   [facility] with [facility]   [facility]    [address]  [facility] [id number] [phone]            Jun 25, 2018  4:15 PM CDT   Anticoagulation Visit with [facility]   [facility]    [address]  [facility] [id number] [phone]              Contact Numbers     [facility] Phone Numbers:  Anticoagulation Clinic Appointments : [phone]  Anticoagulation Nurse: [phone]         May 2018 Details    Sun Mon Tue Wed Thu Fri Sat       1               2               3               4               5                 6               7               8               9               10               11               12                 13               14               15               16               17               18               19                 20               21      5 mg   See details      22      5 mg         23      2.5 mg         24      5 mg         25      2.5 mg         26      5 mg           27      2.5 mg         28      5 mg         29      5 mg         30      2.5 mg         31      5 mg             Date Details   05/21 This INR check               How to take your warfarin dose     To take:  2.5 mg Take 0.5 of a 5 mg tablet.    To take:  5 mg Take 1 of the 5 mg tablets.           June 2018 Details    Sun Mon Tue Wed Thu Fri Sat          1      2.5 mg         2      5 mg           3      2.5 mg         4      5 mg         5      5 mg         6      2.5 mg         7      5 mg         8      2.5 mg         9      5 mg           10      2.5 mg         11      5 mg         12      5 mg         13      2.5 mg         14      5 mg         15      2.5 mg         16      5 mg           17      2.5 mg         18      5 mg         19      5 mg         20      2.5 mg         21      5 mg         22      2.5 mg         23      5 mg           24      2.5 mg         25            26               27               28               29               30                Date Details   No additional details    Date of next INR:  6/25/2018         How to take your warfarin dose     To take:  2.5 mg Take 0.5 of a 5 mg tablet.    To take:  5 mg Take 1 of the 5 mg tablets.

## 2018-05-30 DIAGNOSIS — E78.5 HYPERLIPIDEMIA LDL GOAL <130: ICD-10-CM

## 2018-05-30 DIAGNOSIS — R73.03 PREDIABETES: ICD-10-CM

## 2018-05-30 LAB
ALBUMIN SERPL-MCNC: 3.7 G/DL (ref 3.4–5)
ALP SERPL-CCNC: 47 U/L (ref 40–150)
ALT SERPL W P-5'-P-CCNC: 27 U/L (ref 0–70)
ANION GAP SERPL CALCULATED.3IONS-SCNC: 7 MMOL/L (ref 3–14)
AST SERPL W P-5'-P-CCNC: 18 U/L (ref 0–45)
BILIRUB SERPL-MCNC: 0.7 MG/DL (ref 0.2–1.3)
BUN SERPL-MCNC: 17 MG/DL (ref 7–30)
CALCIUM SERPL-MCNC: 10.1 MG/DL (ref 8.5–10.1)
CHLORIDE SERPL-SCNC: 104 MMOL/L (ref 94–109)
CHOLEST SERPL-MCNC: 188 MG/DL
CO2 SERPL-SCNC: 31 MMOL/L (ref 20–32)
CREAT SERPL-MCNC: 0.88 MG/DL (ref 0.66–1.25)
GFR SERPL CREATININE-BSD FRML MDRD: 87 ML/MIN/1.7M2
GLUCOSE SERPL-MCNC: 111 MG/DL (ref 70–99)
HBA1C MFR BLD: 5.5 % (ref 0–5.6)
HDLC SERPL-MCNC: 68 MG/DL
LDLC SERPL CALC-MCNC: 99 MG/DL
NONHDLC SERPL-MCNC: 120 MG/DL
POTASSIUM SERPL-SCNC: 4 MMOL/L (ref 3.4–5.3)
PROT SERPL-MCNC: 7.1 G/DL (ref 6.8–8.8)
SODIUM SERPL-SCNC: 142 MMOL/L (ref 133–144)
TRIGL SERPL-MCNC: 107 MG/DL

## 2018-05-30 PROCEDURE — 80053 COMPREHEN METABOLIC PANEL: CPT | Performed by: INTERNAL MEDICINE

## 2018-05-30 PROCEDURE — 36415 COLL VENOUS BLD VENIPUNCTURE: CPT | Performed by: INTERNAL MEDICINE

## 2018-05-30 PROCEDURE — 83036 HEMOGLOBIN GLYCOSYLATED A1C: CPT | Performed by: INTERNAL MEDICINE

## 2018-05-30 PROCEDURE — 80061 LIPID PANEL: CPT | Performed by: INTERNAL MEDICINE

## 2018-06-07 ENCOUNTER — OFFICE VISIT (OUTPATIENT)
Dept: INTERNAL MEDICINE | Facility: CLINIC | Age: 63
End: 2018-06-07
Payer: COMMERCIAL

## 2018-06-07 VITALS
HEIGHT: 73 IN | TEMPERATURE: 98.2 F | OXYGEN SATURATION: 98 % | BODY MASS INDEX: 30.09 KG/M2 | SYSTOLIC BLOOD PRESSURE: 130 MMHG | HEART RATE: 80 BPM | DIASTOLIC BLOOD PRESSURE: 72 MMHG | WEIGHT: 227 LBS | RESPIRATION RATE: 14 BRPM

## 2018-06-07 DIAGNOSIS — N52.9 ERECTILE DYSFUNCTION, UNSPECIFIED ERECTILE DYSFUNCTION TYPE: ICD-10-CM

## 2018-06-07 DIAGNOSIS — R73.03 PREDIABETES: ICD-10-CM

## 2018-06-07 DIAGNOSIS — Z86.718 PERSONAL HISTORY OF VENOUS THROMBOSIS AND EMBOLISM: Primary | ICD-10-CM

## 2018-06-07 DIAGNOSIS — Z12.5 SPECIAL SCREENING FOR MALIGNANT NEOPLASM OF PROSTATE: ICD-10-CM

## 2018-06-07 DIAGNOSIS — E78.5 HYPERLIPIDEMIA LDL GOAL <130: ICD-10-CM

## 2018-06-07 DIAGNOSIS — K21.9 GASTROESOPHAGEAL REFLUX DISEASE WITHOUT ESOPHAGITIS: ICD-10-CM

## 2018-06-07 DIAGNOSIS — I10 ESSENTIAL HYPERTENSION: ICD-10-CM

## 2018-06-07 PROCEDURE — 99214 OFFICE O/P EST MOD 30 MIN: CPT | Performed by: INTERNAL MEDICINE

## 2018-06-07 RX ORDER — HYDROCHLOROTHIAZIDE 25 MG/1
25 TABLET ORAL DAILY
Qty: 90 TABLET | Refills: 3 | Status: SHIPPED | OUTPATIENT
Start: 2018-06-07 | End: 2019-06-23

## 2018-06-07 RX ORDER — TADALAFIL 20 MG/1
20 TABLET ORAL DAILY PRN
Qty: 6 TABLET | Refills: 11 | Status: SHIPPED | OUTPATIENT
Start: 2018-06-07 | End: 2019-10-10

## 2018-06-07 RX ORDER — FOLIC ACID 1 MG/1
1000 TABLET ORAL DAILY
Qty: 100 TABLET | Refills: 3 | Status: SHIPPED | OUTPATIENT
Start: 2018-06-07 | End: 2019-07-16

## 2018-06-07 RX ORDER — PRAVASTATIN SODIUM 40 MG
40 TABLET ORAL DAILY
Qty: 90 TABLET | Refills: 3 | Status: SHIPPED | OUTPATIENT
Start: 2018-06-07 | End: 2018-11-12

## 2018-06-07 RX ORDER — VALSARTAN 160 MG/1
160 TABLET ORAL DAILY
Qty: 90 TABLET | Refills: 3 | Status: SHIPPED | OUTPATIENT
Start: 2018-06-07 | End: 2019-07-16

## 2018-06-07 RX ORDER — SILDENAFIL 100 MG/1
100 TABLET, FILM COATED ORAL DAILY PRN
Qty: 6 TABLET | Refills: 11 | Status: SHIPPED | OUTPATIENT
Start: 2018-06-07 | End: 2019-10-10

## 2018-06-07 NOTE — PROGRESS NOTES
SUBJECTIVE:   Alvin Ontiveros is a 62 year old male who presents to clinic today for the following health issues:      Hyperlipidemia Follow-Up      Rate your low fat/cholesterol diet?: good    Taking statin?  Yes, no muscle aches from statin    Other lipid medications/supplements?:  none    Hypertension Follow-up      Outpatient blood pressures are being checked at home and work.  Results are 120-130's/70's.    Low Salt Diet: no added salt      Amount of exercise or physical activity: 4-5 days/week for an average of 45-60 minutes    Problems taking medications regularly: No    Medication side effects: none    Diet: low salt and low fat/cholesterol    Prediabetes/Exercise  Patient notes he exercises about 5 times per week, 45 - 60 minutes. He walks and does a small amount of weight training. Patient is hoping to lose 12 more pounds.  Wt Readings from Last 4 Encounters:   06/07/18 103 kg (227 lb)   09/19/17 102.9 kg (226 lb 12.8 oz)   01/17/17 101.2 kg (223 lb)   05/10/16 99.3 kg (219 lb)      Patient notes heartburn symptoms to be well controlled on omeprazole. No dysphagia or odynophagia     Patient has quite recently noted difficulty sustaining erections satisfactorily.   He has never used a PDE-5 inhibiting medication.     Past/recent records reviewed and discussed for:  - Reviewed labs  - Discussed diabetes metrics, recent A1c 5.5, glucose 111  - Omeprazole for heartburn - well controlled.  - Colonoscopy 04/28/2016, adenomatous polyp. Q 5 years.      Problem list and histories reviewed & adjusted, as indicated.  Additional history: as documented      Reviewed and updated as needed this visit by clinical staff  Tobacco  Allergies  Meds  Med Hx  Surg Hx  Fam Hx  Soc Hx      Reviewed and updated as needed this visit by Provider         ROS:  No dyspnea or cough. No chest discomfort, dizziness or palpitations. No diarrhea, abdominal pain or rectal bleeding.   No acute problems with vision or speech,  "lateralizing weakness or paresthesias.    ROS: as above or negative for Respiratory, CV, GI, , endocrine, neuro systems.     This document serves as a record of the services and decisions personally performed and made by Du Almaraz MD. It was created on his behalf by Jennifer Stephenson, a trained medical scribe. The creation of this document is based on the provider's statements to the medical scribe.  Jennifer Stephenson June 7, 2018 5:11 PM        OBJECTIVE:   /72 (BP Location: Left arm, Patient Position: Sitting, Cuff Size: Adult Large)  Pulse 80  Temp 98.2  F (36.8  C) (Oral)  Resp 14  Ht 1.842 m (6' 0.5\")  Wt 103 kg (227 lb)  SpO2 98%  BMI 30.36 kg/m2  Body mass index is 30.36 kg/(m^2).     GENERAL: healthy, alert and no distress  RESP: lungs clear to auscultation - no rales, rhonchi or wheezes  CV: regular rate and rhythm, normal S1 S2, no S3 or S4, no murmur, click or rub, no peripheral edema and peripheral pulses strong  MS: no gross musculoskeletal defects noted, no edema  SKIN: no suspicious lesions or rashes  NEURO: Normal strength and tone, mentation intact and speech normal  PSYCH: mentation appears normal, affect normal/bright    Diagnostic Test Results:  Results for orders placed or performed in visit on 05/30/18   **A1C FUTURE 1yr   Result Value Ref Range    Hemoglobin A1C 5.5 0 - 5.6 %   Comprehensive metabolic panel (BMP + Alb, Alk Phos, ALT, AST, Total. Bili, TP)   Result Value Ref Range    Sodium 142 133 - 144 mmol/L    Potassium 4.0 3.4 - 5.3 mmol/L    Chloride 104 94 - 109 mmol/L    Carbon Dioxide 31 20 - 32 mmol/L    Anion Gap 7 3 - 14 mmol/L    Glucose 111 (H) 70 - 99 mg/dL    Urea Nitrogen 17 7 - 30 mg/dL    Creatinine 0.88 0.66 - 1.25 mg/dL    GFR Estimate 87 >60 mL/min/1.7m2    GFR Estimate If Black >90 >60 mL/min/1.7m2    Calcium 10.1 8.5 - 10.1 mg/dL    Bilirubin Total 0.7 0.2 - 1.3 mg/dL    Albumin 3.7 3.4 - 5.0 g/dL    Protein Total 7.1 6.8 - 8.8 g/dL    Alkaline Phosphatase " 47 40 - 150 U/L    ALT 27 0 - 70 U/L    AST 18 0 - 45 U/L   Lipid panel reflex to direct LDL Fasting   Result Value Ref Range    Cholesterol 188 <200 mg/dL    Triglycerides 107 <150 mg/dL    HDL Cholesterol 68 >39 mg/dL    LDL Cholesterol Calculated 99 <100 mg/dL    Non HDL Cholesterol 120 <130 mg/dL       ASSESSMENT/PLAN:   (Z86.718) Personal history of venous thrombosis and embolism  (primary encounter diagnosis)  Comment: Continue warfarin    (R73.03) Prediabetes  Comment: A1c 5.5. Continue current measures.   Plan: folic acid (FOLVITE) 1 MG tablet          (E78.5) Hyperlipidemia LDL goal <130  Comment: LDL at target. Continue current meds.   Plan: pravastatin (PRAVACHOL) 40 MG tablet          (I10) Essential hypertension  Comment: BP at target. Continue current meds.   Plan: hydrochlorothiazide (HYDRODIURIL) 25 MG tablet,        valsartan (DIOVAN) 160 MG tablet          (K21.9) Gastroesophageal reflux disease without esophagitis  Comment: Well controlled. Continue current meds.   Plan: omeprazole (PRILOSEC) 20 MG CR capsule          (N52.9) Erectile Dysfunction.  Offered Rx's, discussed benefits, side effects and directions for use for Cialis and Viagra.     Patient Instructions   Everything looks fine!    Refills of medications have been faxed to your pharmacy.     I'll get back to you with lab results soon, especially if there is anything of concern.      See me in 6-12 months, sooner if problems.        The information in this document, created by the medical scribe for me, accurately reflects the services I personally performed and the decisions made by me. I have reviewed and approved this document for accuracy prior to leaving the patient care area.  June 7, 2018 5:01 PM     Du Almaraz MD  [facility]

## 2018-06-07 NOTE — MR AVS SNAPSHOT
After Visit Summary   6/7/2018    Alvin Ontiveros    MRN: [id number]           Patient Information     Date Of Birth          1955        Visit Information        Provider Department      6/7/2018 4:20 PM Du Almaraz MD [facility]        Today's Diagnoses     Personal history of venous thrombosis and embolism    -  1    Prediabetes        Hyperlipidemia LDL goal <130        Essential hypertension        Gastroesophageal reflux disease without esophagitis          Care Instructions    Everything looks fine!    Refills of medications have been faxed to your pharmacy.     I'll get back to you with lab results soon, especially if there is anything of concern.      See me in 6-12 months, sooner if problems.            Follow-ups after your visit        Your next 10 appointments already scheduled     Jun 25, 2018  4:15 PM CDT   Anticoagulation Visit with [facility]   [facility] ([facility])    [address]  [facility] [address] [phone]              Who to contact     If you have questions or need follow up information about today's clinic visit or your schedule please contact [facility] directly at [phone].  Normal or non-critical lab and imaging results will be communicated to you by Travarkhart, letter or phone within 4 business days after the clinic has received the results. If you do not hear from us within 7 days, please contact the clinic through Travarkhart or phone. If you have a critical or abnormal lab result, we will notify you by phone as soon as possible.  Submit refill requests through GuardiCore or call your pharmacy and they will forward the refill request to us. Please allow 3 business days for your refill to be completed.          Additional Information About Your Visit        MyChart Information     GuardiCore lets you send messages to your doctor, view your test results, renew  "your prescriptions, schedule appointments and more. To sign up, go to [email or web address] . Click on \"Log in\" on the left side of the screen, which will take you to the Welcome page. Then click on \"Sign up Now\" on the right side of the page.     You will be asked to enter the access code listed below, as well as some personal information. Please follow the directions to create your username and password.     Your access code is: [email or web address]  Expires: 2018  5:11 PM     Your access code will  in 90 days. If you need help or a new code, please call your [facility] clinic or [phone].        Care EveryWhere ID     This is your Care EveryWhere ID. This could be used by other organizations to access your [facility] medical records  [id number]        Your Vitals Were     Pulse Temperature Respirations Height Pulse Oximetry BMI (Body Mass Index)    80 98.2  F (36.8  C) (Oral) 14 6' 0.5\" (1.842 m) 98% 30.36 kg/m2       Blood Pressure from Last 3 Encounters:   18 130/72   17 136/76   17 136/74    Weight from Last 3 Encounters:   18 227 lb (103 kg)   17 226 lb 12.8 oz (102.9 kg)   17 223 lb (101.2 kg)              Today, you had the following     No orders found for display         Where to get your medicines      These medications were sent to [facility] [address] [address]     Phone:  [phone]     folic acid 1 MG tablet    hydrochlorothiazide 25 MG tablet    omeprazole 20 MG CR capsule    pravastatin 40 MG tablet    valsartan 160 MG tablet          Primary Care Provider Office Phone # Fax #    Du Almaraz [phone]       [address] [address]        Equal Access to Services     TENZIN SMITH : Guru Feliciano, shanel brody, qaybta kaalmada adealka ochoa. So [facility] " [phone].    ATENCIÓN: Si megan anders, tiene a mcneal disposición servicios gratuitos de asistencia lingüística. Maria Ines nicole [phone].    We comply with applicable federal civil rights laws and [place] laws. We do not discriminate on the basis of race, color, national origin, age, disability, sex, sexual orientation, or gender identity.            Thank you!     Thank you for choosing [facility]  for your care. Our goal is always to provide you with excellent care. Hearing back from our patients is one way we can continue to improve our services. Please take a few minutes to complete the written survey that you may receive in the mail after your visit with us. Thank you!             Your Updated Medication List - Protect others around you: Learn how to safely use, store and throw away your medicines at www.disposemymeds.org.          This list is accurate as of 6/7/18  5:11 PM.  Always use your most recent med list.                   Brand Name Dispense Instructions for use Diagnosis    folic acid 1 MG tablet    FOLVITE    100 tablet    Take 1 tablet (1,000 mcg) by mouth daily    Prediabetes       hydrochlorothiazide 25 MG tablet    HYDRODIURIL    90 tablet    Take 1 tablet (25 mg) by mouth daily    Essential hypertension       omeprazole 20 MG CR capsule    priLOSEC    90 capsule    Take 1 capsule (20 mg) by mouth daily    Gastroesophageal reflux disease without esophagitis       pravastatin 40 MG tablet    PRAVACHOL    90 tablet    Take 1 tablet (40 mg) by mouth daily at bedtime.    Hyperlipidemia LDL goal <130       valsartan 160 MG tablet    DIOVAN    90 tablet    Take 1 tablet (160 mg) by mouth daily    Essential hypertension       warfarin 5 MG tablet    COUMADIN    90 tablet    TAKE 1 TABLET DAILY EXCEPT TAKE ONE-HALF (1/2) TABLET ON WEDNESDAY AND SUNDAY OR AS INSTRUCTED BASED ON INR RESULT    Heterozygous factor V Leiden mutation (H)

## 2018-06-07 NOTE — PATIENT INSTRUCTIONS
Everything looks fine!    Refills of medications have been faxed to your pharmacy.     I'll get back to you with lab results soon, especially if there is anything of concern.      See me in 6-12 months, sooner if problems.

## 2018-06-07 NOTE — LETTER
"  [facility]  [address] [place] [address]  [phone]    6/7/2018    Alvin PEDRO Ontiveros  [address] [facility] [address]           Dear Mr. Ontiveros,    The results of your lab tests are enclosed. Everything looks fine. Unless noted otherwise below, any results that are outside the \"normal\" range are within acceptable limits and are of no concern.    Hemoglobin A1C measures control of diabetes. Your Hemoglobin A1C remains in the PRE-diabetes range.     LDL= Bad Cholesterol-- the target is below 100.     HDL= Good Cholesterol-- although this is determined mostly by heredity, exercise and/or medications may sometimes raise this number.    Triglycerides are another type of fat in the blood, and can sometimes be lowered by reducing intake of sweets or excess carbohydrates, alcohol, and by weight reduction if needed.  Sometimes medications are also used.    AST and ALT are liver tests, as are the bilirubin (total and direct), albumin, total protein, and alkaline phosphatase. Yours are all normal.     Urea Nitrogen and Creatinine are kidney tests--yours are normal. GFR stands for Glomerular Filtration Rate, a more complicated estimate of kidney function.    Sodium, Potassium, Chloride, Carbon Dioxide, and Calcium are all normal salts in the bloodstream. Yours all look normal. Your glucose (blood sugar) also looks fine. (You can ignore the anion gap result).     If you have any further questions or problems, please contact our office.    Sincerely,      Du Almaraz MD  Attachment: Lab results     "

## 2018-06-25 ENCOUNTER — ANTICOAGULATION THERAPY VISIT (OUTPATIENT)
Dept: ANTICOAGULATION | Facility: CLINIC | Age: 63
End: 2018-06-25
Payer: COMMERCIAL

## 2018-06-25 DIAGNOSIS — Z79.01 LONG-TERM (CURRENT) USE OF ANTICOAGULANTS: ICD-10-CM

## 2018-06-25 DIAGNOSIS — D68.52 PROTHROMBIN GENE MUTATION (H): ICD-10-CM

## 2018-06-25 DIAGNOSIS — Z86.718 PERSONAL HISTORY OF VENOUS THROMBOSIS AND EMBOLISM: ICD-10-CM

## 2018-06-25 DIAGNOSIS — D68.51 HETEROZYGOUS FACTOR V LEIDEN MUTATION (H): ICD-10-CM

## 2018-06-25 LAB — INR POINT OF CARE: 2.5 (ref 0.86–1.14)

## 2018-06-25 PROCEDURE — 36416 COLLJ CAPILLARY BLOOD SPEC: CPT

## 2018-06-25 PROCEDURE — 85610 PROTHROMBIN TIME: CPT | Mod: QW

## 2018-06-25 PROCEDURE — 99207 ZZC NO CHARGE NURSE ONLY: CPT

## 2018-06-25 NOTE — PROGRESS NOTES
ANTICOAGULATION FOLLOW-UP CLINIC VISIT    Patient Name:  Alvin Ontiveros  Date:  6/25/2018  Contact Type:  Face to Face    SUBJECTIVE:     Patient Findings     Positives No Problem Findings           OBJECTIVE    INR Protime   Date Value Ref Range Status   06/25/2018 2.5 (A) 0.86 - 1.14 Final       ASSESSMENT / PLAN  INR assessment THER    Recheck INR In: 6 WEEKS    INR Location Clinic      Anticoagulation Summary as of 6/25/2018     INR goal 2.0-3.0   Today's INR 2.5   Warfarin maintenance plan 2.5 mg (5 mg x 0.5) on Sun, Wed, Fri; 5 mg (5 mg x 1) all other days   Full warfarin instructions 2.5 mg on Sun, Wed, Fri; 5 mg all other days   Weekly warfarin total 27.5 mg   No change documented Luna Oakley RN   Plan last modified Kari Lizarraga RN (5/21/2018)   Next INR check 8/6/2018   Priority INR   Target end date     Indications   Long-term (current) use of anticoagulants [Z79.01] [Z79.01]  Heterozygous factor V Leiden mutation (H) [D68.51]  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]  Personal history of venous thrombosis and embolism [Z86.718]         Anticoagulation Episode Summary     INR check location     Preferred lab     Send INR reminders to [facility]    Comments Takes in AM      Anticoagulation Care Providers     Provider Role Specialty Phone number    Du Almaraz MD [facility] Internal Medicine [phone]            See the Encounter Report to view Anticoagulation Flowsheet and Dosing Calendar (Go to Encounters tab in chart review, and find the Anticoagulation Therapy Visit)    Dosage adjustment made based on physician directed care plan.    Luna Oakley RN

## 2018-06-25 NOTE — MR AVS SNAPSHOT
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]  Personal history of venous thrombosis and embolism [Z86.718]         Your next Anticoagulation Clinic appointment(s)     Aug 06, 2018  4:15 PM CDT   [facility]agulation [facility] with [facility]   [facility]    [address] [facility] [address]  [facility] [id number] [phone]              Contact Numbers     [facility] Phone Numbers:  Anticoagulation Clinic Appointments : [phone]  Anticoagulation Nurse: [phone]         June 2018 Details    Sun Mon Tue Wed Thu Fri Sat          1               2                 3               4               5               6               7               8               9                 10               11               12               13               14               15               16                 17               18               19               20               21               22               23                 24               25      5 mg   See details      26      5 mg         27      2.5 mg         28      5 mg         29      2.5 mg         30      5 mg          Date Details   06/25 This INR check               How to take your warfarin dose     To take:  2.5 mg Take 0.5 of a 5 mg tablet.    To take:  5 mg Take 1 of the 5 mg tablets.           July 2018 Details    Sun Mon Tue Wed Thu Fri Sat     1      2.5 mg          2      5 mg         3      5 mg         4      2.5 mg         5      5 mg         6      2.5 mg         7      5 mg           8      2.5 mg         9      5 mg         10      5 mg         11      2.5 mg         12      5 mg         13      2.5 mg         14      5 mg           15      2.5 mg         16      5 mg         17      5 mg         18      2.5 mg         19      5 mg         20      2.5 mg         21      5 mg           22      2.5 mg         23      5 mg         24      5 mg         25      2.5 mg         26      5 mg         27      2.5 mg         28      5 mg           29      2.5 mg         30      5 mg         31      5 mg              Date Details   No additional details            How to take your warfarin dose     To take:  2.5 mg Take 0.5 of a 5 mg tablet.    To take:  5 mg Take 1 of the 5 mg tablets.           August 2018 Details    Sun Mon Tue Wed Thu Fri Sat        1      2.5 mg         2      5 mg         3      2.5 mg         4      5 mg           5      2.5 mg         6            7               8               9               10               11                 12               13               14               15               16               17               18                 19               20               21               22               23               24               25                 26               27               28               29               30               31                 Date Details   No additional details    Date of next INR:  8/6/2018         How to take your warfarin dose     To take:  2.5 mg Take 0.5 of a 5 mg tablet.    To take:  5 mg Take 1 of the 5 mg tablets.

## 2018-07-20 DIAGNOSIS — D68.51 HETEROZYGOUS FACTOR V LEIDEN MUTATION (H): ICD-10-CM

## 2018-07-21 RX ORDER — WARFARIN SODIUM 5 MG/1
TABLET ORAL
Qty: 72 TABLET | Refills: 1 | Status: SHIPPED | OUTPATIENT
Start: 2018-07-21 | End: 2019-01-16

## 2018-07-21 NOTE — TELEPHONE ENCOUNTER
"Warfarin 5 mg      Last Written Prescription Date:  1/22/18  Last Fill Quantity: 90,   # refills: 1  Last Office Visit: 6/7/18  Future Office visit:     Requested Prescriptions   Pending Prescriptions Disp Refills     warfarin (COUMADIN) 5 MG tablet [Pharmacy Med Name: WARFARIN TABS 5MG] 90 tablet 1     Sig: TAKE 1 TABLET DAILY EXCEPT TAKE ONE-HALF (1/2) TABLET ON WEDNESDAY AND SUNDAY OR AS INSTRUCTED BASED ON INR RESULT    Vitamin K Antagonists Failed    7/20/2018 11:19 PM       Failed - INR is within goal in the past 6 weeks    Confirm INR is within goal in the past 6 weeks.     Recent Labs   Lab Test 06/25/18   INR  2.5*                      Passed - Recent (12 mo) or future (30 days) visit within the authorizing provider's specialty    Patient had office visit in the last 12 months or has a visit in the next 30 days with authorizing provider or within the authorizing provider's specialty.  See \"Patient Info\" tab in inbasket, or \"Choose Columns\" in Meds & Orders section of the refill encounter.           Passed - Patient is 18 years of age or older        Prescription approved per [facility] Refill Protocol.  Luna Oakley RN      "

## 2018-08-06 ENCOUNTER — ANTICOAGULATION THERAPY VISIT (OUTPATIENT)
Dept: ANTICOAGULATION | Facility: CLINIC | Age: 63
End: 2018-08-06
Payer: COMMERCIAL

## 2018-08-06 DIAGNOSIS — D68.51 HETEROZYGOUS FACTOR V LEIDEN MUTATION (H): ICD-10-CM

## 2018-08-06 DIAGNOSIS — D68.52 PROTHROMBIN GENE MUTATION (H): ICD-10-CM

## 2018-08-06 DIAGNOSIS — Z79.01 LONG-TERM (CURRENT) USE OF ANTICOAGULANTS: ICD-10-CM

## 2018-08-06 DIAGNOSIS — Z86.718 PERSONAL HISTORY OF VENOUS THROMBOSIS AND EMBOLISM: ICD-10-CM

## 2018-08-06 LAB — INR POINT OF CARE: 2.3 (ref 0.86–1.14)

## 2018-08-06 PROCEDURE — 85610 PROTHROMBIN TIME: CPT | Mod: QW

## 2018-08-06 PROCEDURE — 36416 COLLJ CAPILLARY BLOOD SPEC: CPT

## 2018-08-06 PROCEDURE — 99207 ZZC NO CHARGE NURSE ONLY: CPT

## 2018-08-06 NOTE — MR AVS SNAPSHOT
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]  Personal history of venous thrombosis and embolism [Z86.718]         Your next Anticoagulation Clinic appointment(s)     Sep 17, 2018  4:15 PM CDT   Anticoagulation Visit with [facility]   [facility] ([facility]    [address] [facility] [address]  [facility] [id number] [phone]              Contact Numbers     [facility] Phone Numbers:  Anticoagulation Clinic Appointments : [phone]  Anticoagulation Nurse: [phone]         August 2018 Details    Sun Mon Tue Wed Thu Fri Sat        1               2               3               4                 5               6      5 mg   See details      7      5 mg         8      2.5 mg         9      5 mg         10      2.5 mg         11      5 mg           12      2.5 mg         13      5 mg         14      5 mg         15      2.5 mg         16      5 mg         17      2.5 mg         18      5 mg           19      2.5 mg         20      5 mg         21      5 mg         22      2.5 mg         23      5 mg         24      2.5 mg         25      5 mg           26      2.5 mg         27      5 mg         28      5 mg         29      2.5 mg         30      5 mg         31      2.5 mg           Date Details   08/06 This INR check               How to take your warfarin dose     To take:  2.5 mg Take 0.5 of a 5 mg tablet.    To take:   5 mg Take 1 of the 5 mg tablets.           September 2018 Details    Sun Mon Tue Wed Thu Fri Sat           1      5 mg           2      2.5 mg         3      5 mg         4      5 mg         5      2.5 mg         6      5 mg         7      2.5 mg         8      5 mg           9      2.5 mg         10      5 mg         11      5 mg         12      2.5 mg         13      5 mg         14      2.5 mg         15      5 mg           16      2.5 mg         17            18               19               20               21               22                 23               24               25               26               27               28               29                 30                      Date Details   No additional details    Date of next INR:  9/17/2018         How to take your warfarin dose     To take:  2.5 mg Take 0.5 of a 5 mg tablet.    To take:  5 mg Take 1 of the 5 mg tablets.

## 2018-08-06 NOTE — PROGRESS NOTES
ANTICOAGULATION FOLLOW-UP CLINIC VISIT    Patient Name:  Alvin Ontiveros  Date:  8/6/2018  Contact Type:  Face to Face    SUBJECTIVE:     Patient Findings     Positives No Problem Findings    Comments Pt denies any changes since last INR appt.            OBJECTIVE    INR Protime   Date Value Ref Range Status   08/06/2018 2.3 (A) 0.86 - 1.14 Final       ASSESSMENT / PLAN  INR assessment THER    Recheck INR In: 6 WEEKS    INR Location Clinic      Anticoagulation Summary as of 8/6/2018     INR goal 2.0-3.0   Today's INR 2.3   Warfarin maintenance plan 2.5 mg (5 mg x 0.5) on Sun, Wed, Fri; 5 mg (5 mg x 1) all other days   Full warfarin instructions 2.5 mg on Sun, Wed, Fri; 5 mg all other days   Weekly warfarin total 27.5 mg   No change documented Luna Oakley RN   Plan last modified Kari Lizarraga RN (5/21/2018)   Next INR check 9/17/2018   Priority INR   Target end date     Indications   Long-term (current) use of anticoagulants [Z79.01] [Z79.01]  Heterozygous factor V Leiden mutation (H) [D68.51]  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]  Personal history of venous thrombosis and embolism [Z86.718]         Anticoagulation Episode Summary     INR check location     Preferred lab     Send INR reminders to [facility]    Comments Takes in AM      Anticoagulation Care Providers     Provider Role Specialty Phone number    Du Almaraz MD [facility] Internal Medicine [phone]            See the Encounter Report to view Anticoagulation Flowsheet and Dosing Calendar (Go to Encounters tab in chart review, and find the Anticoagulation Therapy Visit)    Dosage adjustment made based on physician directed care plan.    Luna Oakley, RN

## 2018-09-17 ENCOUNTER — ANTICOAGULATION THERAPY VISIT (OUTPATIENT)
Dept: ANTICOAGULATION | Facility: CLINIC | Age: 63
End: 2018-09-17
Payer: COMMERCIAL

## 2018-09-17 ENCOUNTER — TELEPHONE (OUTPATIENT)
Dept: ANTICOAGULATION | Facility: CLINIC | Age: 63
End: 2018-09-17

## 2018-09-17 DIAGNOSIS — D68.52 PROTHROMBIN GENE MUTATION (H): ICD-10-CM

## 2018-09-17 DIAGNOSIS — Z79.01 LONG-TERM (CURRENT) USE OF ANTICOAGULANTS: ICD-10-CM

## 2018-09-17 DIAGNOSIS — Z86.718 PERSONAL HISTORY OF VENOUS THROMBOSIS AND EMBOLISM: ICD-10-CM

## 2018-09-17 DIAGNOSIS — D68.51 HETEROZYGOUS FACTOR V LEIDEN MUTATION (H): Primary | ICD-10-CM

## 2018-09-17 DIAGNOSIS — D68.51 HETEROZYGOUS FACTOR V LEIDEN MUTATION (H): ICD-10-CM

## 2018-09-17 LAB — INR POINT OF CARE: 2 (ref 0.86–1.14)

## 2018-09-17 PROCEDURE — 85610 PROTHROMBIN TIME: CPT | Mod: QW

## 2018-09-17 PROCEDURE — 36416 COLLJ CAPILLARY BLOOD SPEC: CPT

## 2018-09-17 PROCEDURE — 99207 ZZC NO CHARGE NURSE ONLY: CPT

## 2018-09-17 NOTE — TELEPHONE ENCOUNTER
For insurance purposes, an annual INR referral is required. Please complete and sign the order then route back to the INR Clinic. Luna Oakley RN

## 2018-09-17 NOTE — MR AVS SNAPSHOT
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]  Personal history of venous thrombosis and embolism [Z86.718]         Your next Anticoagulation Clinic appointment(s)     Oct 29, 2018  4:15 PM CDT   [facility]ticoagulation Visit with [facility]   [facility]    [address] [facility] [address]  [facility] [phone]              Contact Numbers     [facility] Phone Numbers:  Anticoagulation Clinic Appointments : [phone]  Anticoagulation Nurse: [phone]         September 2018 Details    Sun Mon Tue Wed Thu Fri Sat           1                 2               3               4               5               6               7               8                 9               10               11               12               13               14               15                 16               17      5 mg   See details      18      5 mg         19      2.5 mg         20      5 mg         21      2.5 mg         22      5 mg           23      2.5 mg         24      5 mg         25      5 mg         26      2.5 mg         27      5 mg         28      2.5 mg         29      5 mg           30      2.5 mg                Date Details   09/17 This INR check               How to take your warfarin dose     To take:  2.5 mg Take 0.5 of a 5 mg tablet.    To take:  5 mg Take 1 of the 5 mg tablets.           October 2018  Details    Sun Mon Tue Wed Thu Fri Sat      1      5 mg         2      5 mg         3      2.5 mg         4      5 mg         5      2.5 mg         6      5 mg           7      2.5 mg         8      5 mg         9      5 mg         10      2.5 mg         11      5 mg         12      2.5 mg         13      5 mg           14      2.5 mg         15      5 mg         16      5 mg         17      2.5 mg         18      5 mg         19      2.5 mg         20      5 mg           21      2.5 mg         22      5 mg         23      5 mg         24      2.5 mg         25      5 mg         26      2.5 mg         27      5 mg           28      2.5 mg         29            30               31                   Date Details   No additional details    Date of next INR:  10/29/2018         How to take your warfarin dose     To take:  2.5 mg Take 0.5 of a 5 mg tablet.    To take:  5 mg Take 1 of the 5 mg tablets.

## 2018-09-17 NOTE — PROGRESS NOTES
ANTICOAGULATION FOLLOW-UP CLINIC VISIT    Patient Name:  Alvin Ontiveros  Date:  9/17/2018  Contact Type:  Face to Face    SUBJECTIVE:     Patient Findings     Positives No Problem Findings           OBJECTIVE    INR Protime   Date Value Ref Range Status   09/17/2018 2.0 (A) 0.86 - 1.14 Final       ASSESSMENT / PLAN  INR assessment THER    Recheck INR In: 6 WEEKS    INR Location Clinic      Anticoagulation Summary as of 9/17/2018     INR goal 2.0-3.0   Today's INR 2.0   Warfarin maintenance plan 2.5 mg (5 mg x 0.5) on Sun, Wed, Fri; 5 mg (5 mg x 1) all other days   Full warfarin instructions 2.5 mg on Sun, Wed, Fri; 5 mg all other days   Weekly warfarin total 27.5 mg   No change documented Luna Oakley RN   Plan last modified Kari Lizarraga RN (5/21/2018)   Next INR check 10/29/2018   Priority INR   Target end date     Indications   Long-term (current) use of anticoagulants [Z79.01] [Z79.01]  Heterozygous factor V Leiden mutation (H) [D68.51]  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]  Personal history of venous thrombosis and embolism [Z86.718]         Anticoagulation Episode Summary     INR check location     Preferred lab     Send INR reminders to [facility]    Comments Takes in AM      Anticoagulation Care Providers     Provider Role Specialty Phone number    Du Almaraz MD [facility] Internal Medicine [phone]            See the Encounter Report to view Anticoagulation Flowsheet and Dosing Calendar (Go to Encounters tab in chart review, and find the Anticoagulation Therapy Visit)    Dosage adjustment made based on physician directed care plan.    Luna Oakley RN

## 2018-09-28 ENCOUNTER — RADIANT APPOINTMENT (OUTPATIENT)
Dept: GENERAL RADIOLOGY | Facility: CLINIC | Age: 63
End: 2018-09-28
Attending: INTERNAL MEDICINE
Payer: COMMERCIAL

## 2018-09-28 ENCOUNTER — OFFICE VISIT (OUTPATIENT)
Dept: INTERNAL MEDICINE | Facility: CLINIC | Age: 63
End: 2018-09-28
Payer: COMMERCIAL

## 2018-09-28 VITALS
OXYGEN SATURATION: 99 % | SYSTOLIC BLOOD PRESSURE: 122 MMHG | HEART RATE: 75 BPM | HEIGHT: 73 IN | BODY MASS INDEX: 30.62 KG/M2 | TEMPERATURE: 98.1 F | DIASTOLIC BLOOD PRESSURE: 70 MMHG | WEIGHT: 231 LBS

## 2018-09-28 DIAGNOSIS — M25.562 LEFT KNEE PAIN, UNSPECIFIED CHRONICITY: ICD-10-CM

## 2018-09-28 DIAGNOSIS — M25.562 LEFT KNEE PAIN, UNSPECIFIED CHRONICITY: Primary | ICD-10-CM

## 2018-09-28 DIAGNOSIS — I10 ESSENTIAL HYPERTENSION: ICD-10-CM

## 2018-09-28 PROCEDURE — 73560 X-RAY EXAM OF KNEE 1 OR 2: CPT | Mod: LT

## 2018-09-28 PROCEDURE — 99214 OFFICE O/P EST MOD 30 MIN: CPT | Performed by: INTERNAL MEDICINE

## 2018-09-28 RX ORDER — LOSARTAN POTASSIUM 100 MG/1
100 TABLET ORAL DAILY
Qty: 90 TABLET | Refills: 1 | Status: SHIPPED | OUTPATIENT
Start: 2018-09-28 | End: 2018-10-29

## 2018-09-28 NOTE — NURSING NOTE
"/70  Pulse 75  Temp 98.1  F (36.7  C) (Oral)  Ht 6' 0.5\" (1.842 m)  Wt 231 lb (104.8 kg)  SpO2 99%  BMI 30.9 kg/m2    " Aryan Bain)  2017 07:15:35

## 2018-09-28 NOTE — PROGRESS NOTES
"  SUBJECTIVE:   Alvin Ontiveros is a 63 year old male who presents to clinic today for the following health issues:    Left knee pain. The patient has had pain for one year. Lateral side of knee hurts.   He is not in pain when he walks.  He reports that his knee hurts when he presses on.   When he crosses his leg, he is not comfortable.     HTN.  He is on Valsartan.  He exercises 2 miles per day of walking.   He lifts light weighs.     DVT.  He is on coumadin. INR has been therapeutic. Last INR was 2.0    Problem list and histories reviewed & adjusted, as indicated.    Reviewed and updated as needed this visit by clinical staff  Tobacco  Allergies  Meds  Med Hx  Surg Hx  Fam Hx  Soc Hx      Reviewed and updated as needed this visit by Provider         ROS:  CONSTITUTIONAL: NEGATIVE for fever, chills, change in weight  RESP: NEGATIVE for significant cough or SOB  CV: NEGATIVE for chest pain, palpitations or peripheral edema  MSK: left knee pain    OBJECTIVE:     /70  Pulse 75  Temp 98.1  F (36.7  C) (Oral)  Ht 6' 0.5\" (1.842 m)  Wt 231 lb (104.8 kg)  SpO2 99%  BMI 30.9 kg/m2  Body mass index is 30.9 kg/(m^2).  GENERAL: healthy, alert and no distress  RESP: lungs clear to auscultation - no rales, rhonchi or wheezes  CV: regular rate and rhythm, normal S1 S2, no S3 or S4, no murmur, click or rub  MSK:  Left knee with no pain upon palpation of joint line; negative Valgus and Varus; ROM intact    ASSESSMENT/PLAN:     (M25.562) Left knee pain, unspecified chronicity  (primary encounter diagnosis)  Comment: arthritis versus IT band  Plan: XR Knee Standing Left 2 Views, ORTHO          REFERRAL            (I10) Essential hypertension  Comment: at goal  Plan: losartan (COZAAR) 100 MG tablet          Stop diovan since on recall.  Start losartan  Check bp1 month after starting (pt reports he has a bp cuff at home)      Caitlin Valdovinos MD  [facility]    "

## 2018-09-28 NOTE — MR AVS SNAPSHOT
After Visit Summary   9/28/2018    Alvin Ontiveros    MRN: [id number]           Patient Information     Date Of Birth          1955        Visit Information        Provider Department      9/28/2018 10:20 AM Caitlin Valdovinos MD [facility]        Today's Diagnoses     Essential hypertension    -  1    Left knee pain, unspecified chronicity           Follow-ups after your visit        Additional Services     ORTHO  REFERRAL       [facility] is referring you to the Orthopedic  Services at [facility].       The  Representative will assist you in the coordination of your Orthopedic and Musculoskeletal Care as prescribed by your physician.    The  Representative will call you within 1 business day to help schedule your appointment, or you may contact the  Representative at:    All areas ~ [phone]     Type of Referral : Non Surgical / Sport Medicine       Timeframe requested: Routine    Coverage of these services is subject to the terms and limitations of your health insurance plan.  Please call member services at your health plan with any benefit or coverage questions.      If X-rays, CT or MRI's have been performed, please contact the facility where they were done to arrange for , prior to your scheduled appointment.  Please bring this referral request to your appointment and present it to your specialist.                  Your next 10 appointments already scheduled     Oct 29, 2018  4:15 PM CDT   [facility]    [address]  [facility] [id number] [phone]              Future tests that were ordered for you today     Open Future Orders        Priority Expected Expires Ordered    XR Knee Standing Left 2 Views Routine 9/28/2018 9/28/2019 9/28/2018            Who to  "contact     If you have questions or need follow up information about today's clinic visit or your schedule please contact [facility] directly at [phone].  Normal or non-critical lab and imaging results will be communicated to you by MyChart, letter or phone within 4 business days after the clinic has received the results. If you do not hear from us within 7 days, please contact the clinic through MyChart or phone. If you have a critical or abnormal lab result, we will notify you by phone as soon as possible.  Submit refill requests through [employer] or call your pharmacy and they will forward the refill request to us. Please allow 3 business days for your refill to be completed.          Additional Information About Your Visit        Care EveryWhere ID     This is your Care EveryWhere ID. This could be used by other organizations to access your [place] medical records  [id number]        Your Vitals Were     Pulse Temperature Height Pulse Oximetry BMI (Body Mass Index)       75 98.1  F (36.7  C) (Oral) 6' 0.5\" (1.842 m) 99% 30.9 kg/m2        Blood Pressure from Last 3 Encounters:   09/28/18 122/70   06/07/18 130/72   09/19/17 136/76    Weight from Last 3 Encounters:   09/28/18 231 lb (104.8 kg)   06/07/18 227 lb (103 kg)   09/19/17 226 lb 12.8 oz (102.9 kg)              We Performed the Following     ORTHO  REFERRAL          Today's Medication Changes          These changes are accurate as of 9/28/18 11:14 AM.  If you have any questions, ask your nurse or doctor.               Start taking these medicines.        Dose/Directions    losartan 100 MG tablet   Commonly known as:  COZAAR   Used for:  Essential hypertension   Started by:  Caitlin Valdovinos MD        Dose:  100 mg   Take 1 tablet (100 mg) by mouth daily   Quantity:  90 tablet   Refills:  1            Where to get your medicines      These medications [facility] [address] [place], [place] [address] AT [facility] [address] [facility] [address]  [address], [facility] [address]     Phone:  [phone]     losartan 100 MG tablet                Primary Care Provider Office Phone # Fax #    Du Almaraz [phone]       [address] [facility] [address]  [facility] [address]        Equal Access to Services     TENZIN SMITH : Hadii aad ku hadasho Soomaali, waaxda luqadaha, qaybta kaalmada adeegyada, waxay idiin hayaan adeeg kharash la'aan ah. So [facility] [phone].    ATENCIÓN: Si habla español, tiene a mcneal disposición servicios gratuitos de asistencia lingüística. [facility] [phone].    We comply with applicable federal civil rights laws and [place] laws. We do not discriminate on the basis of race, color, national origin, age, disability, sex, sexual orientation, or gender identity.            Thank you!     Thank you for choosing [facility]  for your care. Our goal is always to provide you with excellent care. Hearing back from our patients is one way we can continue to improve our services. Please take a few minutes to complete the written survey that you may receive in the mail after your visit with us. Thank you!             Your Updated Medication List - Protect others around you: Learn how to safely use, store and throw away your medicines at www.disposemymeds.org.          This list is accurate as of 9/28/18 11:14 AM.  Always use your most recent med list.                   Brand Name Dispense Instructions for use Diagnosis    folic acid 1 MG tablet    FOLVITE    100 tablet    Take 1 tablet (1,000 mcg) by mouth daily    Prediabetes       hydrochlorothiazide 25 MG tablet    HYDRODIURIL    90 tablet    Take 1 tablet (25 mg) by mouth daily    Essential hypertension       losartan 100 MG tablet    COZAAR    90 tablet    Take 1 tablet (100 mg) by mouth daily    Essential hypertension       omeprazole 20 MG CR capsule    priLOSEC    90 capsule    Take 1 capsule (20 mg)  by mouth daily    Gastroesophageal reflux disease without esophagitis       pravastatin 40 MG tablet    PRAVACHOL    90 tablet    Take 1 tablet (40 mg) by mouth daily at bedtime.    Hyperlipidemia LDL goal <130       sildenafil 100 MG tablet    VIAGRA    6 tablet    Take 1 tablet (100 mg) by mouth daily as needed    Erectile dysfunction, unspecified erectile dysfunction type       tadalafil 20 MG tablet    CIALIS    6 tablet    Take 1 tablet (20 mg) by mouth daily as needed    Erectile dysfunction, unspecified erectile dysfunction type       valsartan 160 MG tablet    DIOVAN    90 tablet    Take 1 tablet (160 mg) by mouth daily    Essential hypertension       warfarin 5 MG tablet    COUMADIN    72 tablet    TAKE 1 TABLET DAILY EXCEPT TAKE ONE-HALF (1/2) TABLET ON SUNDAY,  WEDNESDAY, AND FRIDAY  OR AS INSTRUCTED BASED ON INR RESULT    Heterozygous factor V Leiden mutation (H)

## 2018-10-03 ENCOUNTER — OFFICE VISIT (OUTPATIENT)
Dept: ORTHOPEDICS | Facility: CLINIC | Age: 63
End: 2018-10-03
Payer: COMMERCIAL

## 2018-10-03 VITALS
HEIGHT: 73 IN | BODY MASS INDEX: 30.62 KG/M2 | DIASTOLIC BLOOD PRESSURE: 70 MMHG | WEIGHT: 231 LBS | SYSTOLIC BLOOD PRESSURE: 122 MMHG

## 2018-10-03 DIAGNOSIS — M17.12 PRIMARY OSTEOARTHRITIS OF LEFT KNEE: Primary | ICD-10-CM

## 2018-10-03 PROCEDURE — 20610 DRAIN/INJ JOINT/BURSA W/O US: CPT | Mod: LT | Performed by: FAMILY MEDICINE

## 2018-10-03 PROCEDURE — 99203 OFFICE O/P NEW LOW 30 MIN: CPT | Mod: 25 | Performed by: FAMILY MEDICINE

## 2018-10-03 RX ADMIN — METHYLPREDNISOLONE ACETATE 40 MG: 40 INJECTION, SUSPENSION INTRA-ARTICULAR; INTRALESIONAL; INTRAMUSCULAR; SOFT TISSUE at 15:14

## 2018-10-03 RX ADMIN — LIDOCAINE HYDROCHLORIDE 4 ML: 10 INJECTION, SOLUTION INFILTRATION; PERINEURAL at 15:14

## 2018-10-03 NOTE — LETTER
10/3/2018         RE: Alvin Ontiveros  [address] [facility] [address]        Dear Colleague,    Thank you for referring your patient, Alvin Ontiveros, to the  [facility]. Please see a copy of my visit note below.    [facility]   Clinic Visit s Oct 3, 2018    PCP: Du Almaraz      Alvin is a 63 year old male who is seen in consultation at the request of Dr. Valdovinos for   Chief Complaint   Patient presents with     Left Knee - Pain       Injury: Reports insidious onset without acute precipitating event.       Location of Pain: left knee anterior and upper leg and lateral, nonradiating   Duration of Pain: 1 year(s)  Rating of Pain at worst: 5/10  Rating of Pain Currently: 0/10  Pain is better with: activity avoidance   Pain is worse with: palpation kneeling  Treatment so far consists of: no treatment tried to date  Associated symptoms: no distal numbness or tingling; denies swelling or warmth  Recent imaging completed: X-rays completed 9/28/18.  Prior History of related problems: none    Social History: is employed as a/an sales      Past Medical History:   Diagnosis Date     Chest pain, unspecified     Abstracted 05/17/02     Esophageal reflux     Abstracted 05/17/02     Gastro-oesophageal reflux disease      Hypertension      Other and unspecified alcohol dependence, unspecified drinking behavior     Abstracted 05/17/02     Other and unspecified coagulation defects 9/27/2006    Heterozygous for both Factor V Leiden and Factor II [id number] gene mutations.     Personal history of venous thrombosis and embolism      Pneumonia, organism unspecified(486) 1/2006    Hospitalized 1/2006 with pleuritic chest pain     Pulmonary embolism (H) 2/2006     Thrombosis of leg 2005     TINY PULMONARY NODULES, PRESUMED BENIGN 9/27/2006     Tobacco use disorder     Abstracted 05/17/02       Patient Active Problem List    Diagnosis Date Noted     Benign neoplasm  of colon, unspecified part of colon 01/17/2017     Priority: Medium     Long-term (current) use of anticoagulants [Z79.01] 04/11/2016     Priority: Medium     Advanced directives, counseling/discussion 11/04/2011     Priority: Medium     Pt will bring in a copy.     Dylon Claudio Lin MA         Prediabetes 04/25/2011     Priority: Medium     HYPERLIPIDEMIA LDL GOAL <130 10/31/2010     Priority: Medium     Heterozygous factor V Leiden mutation (H) 10/23/2008     Priority: Medium     Heterozygous Prothrombin Gene Mutation 10/23/2008     Priority: Medium     Other and unspecified coagulation defects 09/27/2006     Priority: Medium     Heterozygous for both Factor V Leiden and Factor II [id number] gene mutations.       TINY PULMONARY NODULES, PRESUMED BENIGN 09/27/2006     Priority: Medium     Personal history of venous thrombosis and embolism      Priority: Medium     Essential hypertension 12/12/2002     Priority: Medium     Problem list name updated by automated process. Provider to review       Esophageal reflux 12/12/2002     Priority: Medium       Family History   Problem Relation Age of Onset     HEART DISEASE Father      Born 1923. MI at age 67.      GASTROINTESTINAL DISEASE Father      PUD requiring surgery in the 1960's.      Cerebrovascular Disease Brother      Born 1956. Stroke at age 8 yrs old. Has reduced use of his right arm.      Family History Negative Mother      Born 1923.       Social History     Social History     Marital status:      Spouse name: Maryam     Number of children: 5     Years of education: N/A     Occupational History     [employer]     Social History Main Topics     Smoking status: Former Smoker     Packs/day: 1.00     Years: 30.00     Types: Cigarettes     Quit date: 12/7/2005     Smokeless tobacco: Never Used     Past Surgical History:   Procedure Laterality Date     COLONOSCOPY  04/28/2016    One adenomatous polyp removed by Ariana Floyd, [facility], repeat  "in 2021     HC COLONOSCOPY THRU STOMA, DIAGNOSTIC  8/14/2006    Normal--repeat in 10 years.     HC REMOVAL OF TONSILS,<13 Y/O  1962    Tonsils <11y.o.     HC REPAIR INCISIONAL HERNIA,REDUCIBLE  9/2004    Hernia Repair, Incisional, Unilateral, also umbilical hernia repair     REMOVE FOREIGN BODY HAND  1/30/2013    Procedure: REMOVE FOREIGN BODY HAND;  Left Thumb Foreign Body Removal ;  Surgeon: Blaine Willis MD;  Location:  OR       Review of Systems   Musculoskeletal: Positive for joint pain.   All other systems reviewed and are negative.        Physical Exam   Musculoskeletal:        Left knee: Lateral joint line tenderness noted.     /70  Ht 6' 0.5\" (1.842 m)  Wt 231 lb (104.8 kg)  BMI 30.9 kg/m2  Constitutional:well-developed, well-nourished, and in no distress.   Cardiovascular: Intact distal pulses.    Neurological: alert. Gait Normal:   Gait, station, stance, and balance appear normal for age  Skin: Skin is warm and dry.   Psychiatric: Mood and affect normal.   Respiratory: unlabored, speaks in full sentences  Lymph: no LAD, no lymphangitis          Left Knee Exam   Swelling: Mild  Effusion: Yes    Tenderness   The patient is experiencing tenderness in the lateral joint line.    Range of Motion   Extension: Normal  Flexion:     Normal    Tests   McMurrays:  Medial - Negative      Lateral - Negative  Lachman:  Anterior - Negative    Posterior - Negative  Drawer:       Anterior - Negative     Posterior - Negative  Varus:  Negative  Valgus: Negative  Pivot Shift: Negative  Patellar Apprehension: No      Crepitus with range of motion    Recent Results (from the past 744 hour(s))   XR Knee Standing Left 2 Views    Narrative    LEFT KNEE STANDING TWO VIEWS  9/28/2018 11:23 AM     HISTORY:  Left knee pain, unspecified chronicity.    COMPARISON: May 7, 2012      Impression    IMPRESSION: Bones are normally aligned. No knee effusion or fracture.    EDISON WRIGHT MD         ASSESSMENT/PLAN    " ICD-10-CM    1. Primary osteoarthritis of left knee M17.12    On my review I believe there is some lateral compartment narrowing which would be consistent with his symptoms in that area and reflect some mild arthritis.  However differential diagnosis includes meniscus tear.  Offered MRI versus empiric cortisone injection.  Patient opted for the latter.  If not improved over 6 weeks, consider MRI.  May call for MRI if desired.      Large Joint Injection/Arthocentesis  Date/Time: 10/3/2018 3:14 PM  Performed by: FRANCISCO JAVIER ÁLVAREZ  Authorized by: FRANCISCO JAVIER ÁLVAREZ     Indications:  Pain and osteoarthritis  Needle Size:  25 G  Guidance: landmark guided    Approach:  Superolateral  Location:  Knee  Site:  L knee joint  Medications:  4 mL lidocaine 1 %; 40 mg methylPREDNISolone acetate 40 MG/ML  Procedure discussed: discussed risks, benefits, and alternatives    Consent Given by:  Patient  Timeout: timeout called immediately prior to procedure    Prep: patient was prepped and draped in usual sterile fashion     LOT [id number] exp 11/2019                Again, thank you for allowing me to participate in the care of your patient.        Sincerely,        Francisco Javier Álvarez MD

## 2018-10-03 NOTE — MR AVS SNAPSHOT
"              After Visit Summary   10/3/2018    Alvin Ontiveros    MRN: [id number]           Patient Information     Date Of Birth          1955        Visit Information        Provider Department      10/3/2018 2:40 PM Francisco Javier Álvarez MD  Sports Medicine        Today's Diagnoses     Primary osteoarthritis of left knee    -  1       Follow-ups after your visit        Your next 10 appointments already scheduled     Oct 29, 2018  4:15 PM CDT   [facility]    [address]  [facility] [address] [phone]              Who to contact     If you have questions or need follow up information about today's clinic visit or your schedule please contact  [facility] directly at [phone].  Normal or non-critical lab and imaging results will be communicated to you by MyChart, letter or phone within 4 business days after the clinic has received the results. If you do not hear from us within 7 days, please contact the clinic through MyChart or phone. If you have a critical or abnormal lab result, we will notify you by phone as soon as possible.  Submit refill requests through [email or web address] or call your pharmacy and they will forward the refill request to us. Please allow 3 business days for your refill to be completed.          Additional Information About Your Visit        Care EveryWhere ID     This is your Care EveryWhere ID. This could be used by other organizations to access your [facility] medical records  [id number]        Your Vitals Were     Height BMI (Body Mass Index)                6' 0.5\" (1.842 m) 30.9 kg/m2           Blood Pressure from Last 3 Encounters:   10/03/18 122/70   09/28/18 122/70   06/07/18 130/72    Weight from Last 3 Encounters:   10/03/18 231 lb (104.8 kg)   09/28/18 231 lb (104.8 kg)   06/07/18 227 lb (103 kg)              We Performed the Following     Large " Joint Injection/Arthocentesis        Primary Care Provider Office Phone # Fax #    Du Almaraz [phone]       [address] [facility] [address]  [facility] [address]        Equal Access to Services     TENZIN SMITH : Guru mahad verma omero Sowayneali, waaxda luqadaha, qaybta kaalmada adeegyada, alka sheltonelisha zapata. So [facility] [phone].    ATENCIÓN: Si habla español, tiene a mcneal disposición servicios gratuitos de asistencia lingüística. Llame al [phone].    We comply with applicable federal civil rights laws and [place] laws. We do not discriminate on the basis of race, color, national origin, age, disability, sex, sexual orientation, or gender identity.            Thank you!     Thank you for choosing  [facility]  for your care. Our goal is always to provide you with excellent care. Hearing back from our patients is one way we can continue to improve our services. Please take a few minutes to complete the written survey that you may receive in the mail after your visit with us. Thank you!             Your Updated Medication List - Protect others around you: Learn how to safely use, store and throw away your medicines at www.disposemymeds.org.          This list is accurate as of 10/3/18 11:59 PM.  Always use your most recent med list.                   Brand Name Dispense Instructions for use Diagnosis    folic acid 1 MG tablet    FOLVITE    100 tablet    Take 1 tablet (1,000 mcg) by mouth daily    Prediabetes       hydrochlorothiazide 25 MG tablet    HYDRODIURIL    90 tablet    Take 1 tablet (25 mg) by mouth daily    Essential hypertension       losartan 100 MG tablet    COZAAR    90 tablet    Take 1 tablet (100 mg) by mouth daily    Essential hypertension       omeprazole 20 MG CR capsule    priLOSEC    90 capsule    Take 1 capsule (20 mg) by mouth daily    Gastroesophageal reflux disease without esophagitis       pravastatin 40 MG tablet    PRAVACHOL    90 tablet     Take 1 tablet (40 mg) by mouth daily at bedtime.    Hyperlipidemia LDL goal <130       sildenafil 100 MG tablet    VIAGRA    6 tablet    Take 1 tablet (100 mg) by mouth daily as needed    Erectile dysfunction, unspecified erectile dysfunction type       tadalafil 20 MG tablet    CIALIS    6 tablet    Take 1 tablet (20 mg) by mouth daily as needed    Erectile dysfunction, unspecified erectile dysfunction type       valsartan 160 MG tablet    DIOVAN    90 tablet    Take 1 tablet (160 mg) by mouth daily    Essential hypertension       warfarin 5 MG tablet    COUMADIN    72 tablet    TAKE 1 TABLET DAILY EXCEPT TAKE ONE-HALF (1/2) TABLET ON SUNDAY,  WEDNESDAY, AND FRIDAY  OR AS INSTRUCTED BASED ON INR RESULT    Heterozygous factor V Leiden mutation (H)

## 2018-10-05 RX ORDER — METHYLPREDNISOLONE ACETATE 40 MG/ML
40 INJECTION, SUSPENSION INTRA-ARTICULAR; INTRALESIONAL; INTRAMUSCULAR; SOFT TISSUE
Status: DISCONTINUED | OUTPATIENT
Start: 2018-10-03 | End: 2019-07-16 | Stop reason: CLARIF

## 2018-10-05 RX ORDER — LIDOCAINE HYDROCHLORIDE 10 MG/ML
4 INJECTION, SOLUTION INFILTRATION; PERINEURAL
Status: SHIPPED | OUTPATIENT
Start: 2018-10-03

## 2018-10-29 ENCOUNTER — ANTICOAGULATION THERAPY VISIT (OUTPATIENT)
Dept: ANTICOAGULATION | Facility: CLINIC | Age: 63
End: 2018-10-29
Payer: COMMERCIAL

## 2018-10-29 DIAGNOSIS — Z86.718 PERSONAL HISTORY OF VENOUS THROMBOSIS AND EMBOLISM: ICD-10-CM

## 2018-10-29 DIAGNOSIS — I10 ESSENTIAL HYPERTENSION: ICD-10-CM

## 2018-10-29 DIAGNOSIS — D68.51 HETEROZYGOUS FACTOR V LEIDEN MUTATION (H): ICD-10-CM

## 2018-10-29 DIAGNOSIS — D68.52 PROTHROMBIN GENE MUTATION (H): ICD-10-CM

## 2018-10-29 LAB — INR POINT OF CARE: 2.3 (ref 0.86–1.14)

## 2018-10-29 PROCEDURE — 36416 COLLJ CAPILLARY BLOOD SPEC: CPT

## 2018-10-29 PROCEDURE — 85610 PROTHROMBIN TIME: CPT | Mod: QW

## 2018-10-29 PROCEDURE — 99207 ZZC NO CHARGE NURSE ONLY: CPT

## 2018-10-29 RX ORDER — LOSARTAN POTASSIUM 100 MG/1
100 TABLET ORAL DAILY
Qty: 90 TABLET | Refills: 1 | Status: SHIPPED | OUTPATIENT
Start: 2018-10-29 | End: 2019-04-09

## 2018-10-29 NOTE — PROGRESS NOTES
ANTICOAGULATION FOLLOW-UP CLINIC VISIT    Patient Name:  Alvin Ontiveros  Date:  10/29/2018  Contact Type:  Face to Face    SUBJECTIVE:     Patient Findings     Positives Change in medications (Pt reports began Losartan, this should not interfere with warfarin. ), No Problem Findings    Comments Pt denies any missed warfarin doses since last INR visit. Pt denies any bleeding or bruising problems. Pt requested Rx for Losartan tp be semt tp [facility] so he can get the refill for 90 days instead of 30 days at a time.            OBJECTIVE    INR Protime   Date Value Ref Range Status   10/29/2018 2.3 (A) 0.86 - 1.14 Final       ASSESSMENT / PLAN  INR assessment THER    Recheck INR In: 6 WEEKS    INR Location Clinic      Anticoagulation Summary as of 10/29/2018     INR goal 2.0-3.0   Today's INR 2.3   Warfarin maintenance plan 2.5 mg (5 mg x 0.5) on Sun, Wed, Fri; 5 mg (5 mg x 1) all other days   Full warfarin instructions 2.5 mg on Sun, Wed, Fri; 5 mg all other days   Weekly warfarin total 27.5 mg   No change documented Luna Oakley, RN   Plan last modified Kari Lizarraga RN (5/21/2018)   Next INR check 12/10/2018   Priority INR   Target end date     Indications   Long-term (current) use of anticoagulants [Z79.01] [Z79.01]  Heterozygous factor V Leiden mutation (H) [D68.51]  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    Luna NICHOLSON  Adin RN

## 2018-10-29 NOTE — MR AVS SNAPSHOT
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]  Personal history of venous thrombosis and embolism [Z86.718]         Your next Anticoagulation Clinic appointment(s)     Dec 10, 2018  4:15 PM CST   Anticoagulation Visit with [facility]   [facility]    [address] [facility] [address]  [facility] [id number] [phone]              Contact Numbers     [facility] Phone Numbers:  Anticoagulation Clinic Appointments : [phone]  Anticoagulation Nurse: [phone]         October 2018 Details    Sun Mon Tue Wed Thu Fri Sat      1               2               3               4               5               6                 7               8               9               10               11               12               13                 14               15               16               17               18               19               20                 21               22               23               24               25               26               27                 28               29      5 mg   See details      30      5 mg         31      2.5 mg             Date Details   10/29 This INR check               How to take your warfarin dose     To take:  2.5 mg Take 0.5 of a 5 mg tablet.    To take:  5 mg Take 1 of the 5 mg tablets.           November 2018 Details    Sun Mon Tue Wed Thu Fri Sat          1      5 mg         2      2.5 mg         3      5 mg           4      2.5 mg         5      5 mg         6      5 mg         7      2.5 mg         8      5 mg         9      2.5 mg         10      5 mg           11      2.5 mg         12      5 mg         13      5 mg         14      2.5 mg         15      5 mg         16      2.5 mg         17      5 mg           18      2.5 mg         19      5 mg         20      5 mg         21      2.5 mg         22      5 mg         23      2.5 mg         24      5 mg           25      2.5 mg         26      5 mg         27      5 mg         28      2.5 mg         29      5 mg         30      2.5 mg           Date Details   No additional details            How to take your warfarin dose     To take:  2.5 mg Take 0.5 of a 5 mg tablet.    To take:  5 mg Take 1 of the 5 mg tablets.           December 2018 Details    Sun Mon Tue Wed Thu Fri Sat           1      5 mg           2      2.5 mg         3      5 mg         4      5 mg         5      2.5 mg         6      5 mg         7      2.5 mg         8      5 mg           9      2.5 mg         10            11               12               13               14               15                 16               17               18               19               20               21               22                 23               24               25               26               27               28               29                 30               31                     Date Details   No additional details    Date of next INR:  12/10/2018         How to take your warfarin dose     To take:  2.5 mg Take 0.5 of a 5 mg tablet.    To take:  5 mg Take 1 of the 5 mg tablets.

## 2018-11-12 DIAGNOSIS — E78.5 HYPERLIPIDEMIA LDL GOAL <130: ICD-10-CM

## 2018-11-12 NOTE — TELEPHONE ENCOUNTER
"Requested Prescriptions   Pending Prescriptions Disp Refills     pravastatin (PRAVACHOL) 40 MG tablet [Pharmacy Med Name: PRAVASTATIN TABS 40MG]  Last Written Prescription Date:  6/7/2018  Last Fill Quantity: 90,  # refills: 3   Last office visit: 9/28/2018 with prescribing provider:     Future Office Visit:   90 tablet 3     Sig: TAKE 1 TABLET DAILY AT BEDTIME    Statins Protocol Passed    11/12/2018 12:10 AM       Passed - LDL on file in past 12 months    Recent Labs   Lab Test  05/30/18   0830   LDL  99            Passed - No abnormal creatine kinase in past 12 months    No lab results found.            Passed - Recent (12 mo) or future (30 days) visit within the authorizing provider's specialty    Patient had office visit in the last 12 months or has a visit in the next 30 days with authorizing provider or within the authorizing provider's specialty.  See \"Patient Info\" tab in inbasket, or \"Choose Columns\" in Meds & Orders section of the refill encounter.             Passed - Patient is age 18 or older        "

## 2018-11-13 RX ORDER — PRAVASTATIN SODIUM 40 MG
TABLET ORAL
Qty: 90 TABLET | Refills: 2 | Status: SHIPPED | OUTPATIENT
Start: 2018-11-13 | End: 2019-07-16

## 2018-11-13 NOTE — TELEPHONE ENCOUNTER
Prescription approved per [facility] [place]. JONATHAN Hillman R.N.

## 2018-12-13 ENCOUNTER — ANTICOAGULATION THERAPY VISIT (OUTPATIENT)
Dept: ANTICOAGULATION | Facility: CLINIC | Age: 63
End: 2018-12-13
Payer: COMMERCIAL

## 2018-12-13 DIAGNOSIS — D68.52 PROTHROMBIN GENE MUTATION (H): ICD-10-CM

## 2018-12-13 DIAGNOSIS — D68.51 HETEROZYGOUS FACTOR V LEIDEN MUTATION (H): ICD-10-CM

## 2018-12-13 DIAGNOSIS — Z86.718 PERSONAL HISTORY OF VENOUS THROMBOSIS AND EMBOLISM: ICD-10-CM

## 2018-12-13 LAB — INR POINT OF CARE: 1.5 (ref 0.86–1.14)

## 2018-12-13 PROCEDURE — 36416 COLLJ CAPILLARY BLOOD SPEC: CPT

## 2018-12-13 PROCEDURE — 99207 ZZC NO CHARGE NURSE ONLY: CPT

## 2018-12-13 PROCEDURE — 85610 PROTHROMBIN TIME: CPT | Mod: QW

## 2018-12-13 NOTE — PROGRESS NOTES
ANTICOAGULATION FOLLOW-UP CLINIC VISIT    Patient Name:  Alvin Ontiveros  Date:  2018  Contact Type:  Face to Face    SUBJECTIVE:     Patient Findings     Positives:   Unexplained INR or factor level change    Comments:   Pt denies any changes or missed warfarin doses since last INR visit. Pt denies any bleeding or bruising problems.              OBJECTIVE    INR Protime   Date Value Ref Range Status   2018 1.5 (A) 0.86 - 1.14 Final       ASSESSMENT / PLAN  INR assessment SUB    Recheck INR In: 3 WEEKS    INR Location Clinic      Anticoagulation Summary  As of 2018    INR goal:   2.0-3.0   TTR:   79.3 % (2.6 y)   INR used for dosin.5! 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]  Personal history of venous thrombosis and embolism [Z86.718]             Anticoagulation Episode Summary     INR check location:       Preferred lab:       Send INR reminders to:   [facility]    Comments:   Takes in AM      Anticoagulation Care Providers     Provider Role Specialty Phone number    Du Almaraz MD [facility] [phone]            See the Encounter Report to view Anticoagulation Flowsheet and Dosing Calendar (Go to Encounters tab in chart review, and find the Anticoagulation Therapy Visit)    Dosage adjustment made based on physician directed care plan.    Luna Oakley RN

## 2019-01-03 ENCOUNTER — ANTICOAGULATION THERAPY VISIT (OUTPATIENT)
Dept: ANTICOAGULATION | Facility: CLINIC | Age: 64
End: 2019-01-03
Payer: COMMERCIAL

## 2019-01-03 DIAGNOSIS — Z86.718 PERSONAL HISTORY OF VENOUS THROMBOSIS AND EMBOLISM: ICD-10-CM

## 2019-01-03 DIAGNOSIS — D68.51 HETEROZYGOUS FACTOR V LEIDEN MUTATION (H): ICD-10-CM

## 2019-01-03 DIAGNOSIS — D68.52 PROTHROMBIN GENE MUTATION (H): ICD-10-CM

## 2019-01-03 LAB — INR POINT OF CARE: 1.7 (ref 0.86–1.14)

## 2019-01-03 PROCEDURE — 99207 ZZC NO CHARGE NURSE ONLY: CPT

## 2019-01-03 PROCEDURE — 36416 COLLJ CAPILLARY BLOOD SPEC: CPT

## 2019-01-03 PROCEDURE — 85610 PROTHROMBIN TIME: CPT | Mod: QW

## 2019-01-03 NOTE — PROGRESS NOTES
ANTICOAGULATION FOLLOW-UP CLINIC VISIT    Patient Name:  Alvin Ontiveros  Date:  1/3/2019  Contact Type:  Face to Face    SUBJECTIVE:     Patient Findings     Positives:   Unexplained INR or factor level change    Comments:   INR has been consistently low.  Will increase maintenance dose to try to keep INR closer to 2.5.  Patient already took his warfarin today.           OBJECTIVE    INR Protime   Date Value Ref Range Status   2019 1.7 (A) 0.86 - 1.14 Final       ASSESSMENT / PLAN  INR assessment SUB    Recheck INR In: 3 WEEKS    INR Location Clinic      Anticoagulation Summary  As of 1/3/2019    INR goal:   2.0-3.0   TTR:   77.6 % (2.7 y)   INR used for dosin.7! 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]  Personal history of venous thrombosis and embolism [Z86.718]             Anticoagulation Episode Summary     INR check location:       Preferred lab:       Send INR reminders to:   [facility]    Comments:   Takes in AM      Anticoagulation Care Providers     Provider Role Specialty Phone number    Du Almaraz MD [facility] Internal Medicine [phone]            See the Encounter Report to view Anticoagulation Flowsheet and Dosing Calendar (Go to Encounters tab in chart review, and find the Anticoagulation Therapy Visit)    Dosage adjustment made based on physician directed care plan.    Kari Lizarraga RN

## 2019-01-16 DIAGNOSIS — D68.51 HETEROZYGOUS FACTOR V LEIDEN MUTATION (H): ICD-10-CM

## 2019-01-17 RX ORDER — WARFARIN SODIUM 5 MG/1
TABLET ORAL
Qty: 75 TABLET | Refills: 1 | Status: SHIPPED | OUTPATIENT
Start: 2019-01-17 | End: 2019-07-11

## 2019-01-17 NOTE — TELEPHONE ENCOUNTER
"Requested Prescriptions   Pending Prescriptions Disp Refills     warfarin (COUMADIN) 5 MG tablet [Pharmacy Med Name: WARFARIN TABS 5MG] 72 tablet 1    Last Written Prescription Date:  07/21/2018  Last Fill Quantity: 72,  # refills: 1   Last office visit: 9/28/2018 with prescribing provider:     Future Office Visit:   Sig: TAKE 1 TABLET DAILY EXCEPT TAKE ONE-HALF (1/2) TABLET ON SUNDAY, WEDNESDAY, AND FRIDAY OR AS INSTRUCTED BASED ON INR RESULT    Vitamin K Antagonists Failed - 1/16/2019 11:24 PM       Failed - INR is within goal in the past 6 weeks    Confirm INR is within goal in the past 6 weeks.     Recent Labs   Lab Test 01/03/19   INR 1.7*                      Passed - Recent (12 mo) or future (30 days) visit within the authorizing provider's specialty    Patient had office visit in the last 12 months or has a visit in the next 30 days with authorizing provider or within the authorizing provider's specialty.  See \"Patient Info\" tab in inbasket, or \"Choose Columns\" in Meds & Orders section of the refill encounter.             Passed - Medication is active on med list       Passed - Patient is 18 years of age or older        "

## 2019-01-28 ENCOUNTER — ANTICOAGULATION THERAPY VISIT (OUTPATIENT)
Dept: ANTICOAGULATION | Facility: CLINIC | Age: 64
End: 2019-01-28
Payer: COMMERCIAL

## 2019-01-28 DIAGNOSIS — D68.51 HETEROZYGOUS FACTOR V LEIDEN MUTATION (H): ICD-10-CM

## 2019-01-28 DIAGNOSIS — D68.52 PROTHROMBIN GENE MUTATION (H): ICD-10-CM

## 2019-01-28 DIAGNOSIS — Z86.718 PERSONAL HISTORY OF VENOUS THROMBOSIS AND EMBOLISM: ICD-10-CM

## 2019-01-28 LAB — INR POINT OF CARE: 1.9 (ref 0.86–1.14)

## 2019-01-28 PROCEDURE — 36416 COLLJ CAPILLARY BLOOD SPEC: CPT

## 2019-01-28 PROCEDURE — 85610 PROTHROMBIN TIME: CPT | Mod: QW

## 2019-01-28 PROCEDURE — 99207 ZZC NO CHARGE NURSE ONLY: CPT

## 2019-01-28 NOTE — PROGRESS NOTES
ANTICOAGULATION FOLLOW-UP CLINIC VISIT    Patient Name:  Alvin Ontiveros  Date:  2019  Contact Type:  Face to Face    SUBJECTIVE:     Patient Findings     Positives:   Nose bleeds (Pt reports a nose bleed yesterday, was able to stop right away. ), No Problem Findings    Comments:   Pt denies any changes or missed warfarin doses since last INR visit. Pt denies any bruising problems.              OBJECTIVE    INR Protime   Date Value Ref Range Status   2019 1.9 (A) 0.86 - 1.14 Final       ASSESSMENT / PLAN  INR assessment THER    Recheck INR In: 2 WEEKS    INR Location Clinic      Anticoagulation Summary  As of 2019    INR goal:   2.0-3.0   TTR:   75.7 % (2.8 y)   INR used for dosin.9! 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]  Personal history of venous thrombosis and embolism [Z86.718]             Anticoagulation Episode Summary     INR check location:       Preferred lab:       Send INR reminders to:   [facility]    Comments:   Takes in AM      Anticoagulation Care Providers     Provider Role Specialty Phone number    Du Almaraz MD [facility] Internal Medicine [phone]            See the Encounter Report to view Anticoagulation Flowsheet and Dosing Calendar (Go to Encounters tab in chart review, and find the Anticoagulation Therapy Visit)    Dosage adjustment made based on physician directed care plan.    Luna Oakley RN

## 2019-02-11 ENCOUNTER — ANTICOAGULATION THERAPY VISIT (OUTPATIENT)
Dept: ANTICOAGULATION | Facility: CLINIC | Age: 64
End: 2019-02-11
Payer: COMMERCIAL

## 2019-02-11 DIAGNOSIS — Z86.718 PERSONAL HISTORY OF VENOUS THROMBOSIS AND EMBOLISM: ICD-10-CM

## 2019-02-11 DIAGNOSIS — D68.51 HETEROZYGOUS FACTOR V LEIDEN MUTATION (H): ICD-10-CM

## 2019-02-11 DIAGNOSIS — D68.52 PROTHROMBIN GENE MUTATION (H): ICD-10-CM

## 2019-02-11 LAB — INR POINT OF CARE: 2.5 (ref 0.86–1.14)

## 2019-02-11 PROCEDURE — 85610 PROTHROMBIN TIME: CPT | Mod: QW

## 2019-02-11 PROCEDURE — 36416 COLLJ CAPILLARY BLOOD SPEC: CPT

## 2019-02-11 PROCEDURE — 99207 ZZC NO CHARGE NURSE ONLY: CPT

## 2019-02-11 NOTE — PROGRESS NOTES
ANTICOAGULATION FOLLOW-UP CLINIC VISIT    Patient Name:  Alvin Ontiveros  Date:  2019  Contact Type:  Face to Face    SUBJECTIVE:     Patient Findings     Positives:   No Problem Findings    Comments:   Pt denies any changes or missed warfarin doses since last INR visit. Pt denies any bleeding or bruising problems.              OBJECTIVE    INR Protime   Date Value Ref Range Status   2019 2.5 (A) 0.86 - 1.14 Final       ASSESSMENT / PLAN  INR assessment THER    Recheck INR In: 4 WEEKS    INR Location Clinic      Anticoagulation Summary  As of 2019    INR goal:   2.0-3.0   TTR:   75.8 % (2.8 y)   INR used for dosin.5 (2019)   Warfarin maintenance plan:   2.5 mg (5 mg x 0.5) every Sun; 5 mg (5 mg x 1) all other days   Full warfarin instructions:   2.5 mg every Sun; 5 mg all other days   Weekly warfarin total:   32.5 mg   Plan last modified:   Luna Oakley RN (2019)   Next INR check:   3/11/2019   Priority:   INR   Target end date:       Indications    Long-term (current) use of anticoagulants [Z79.01] [Z79.01]  Heterozygous factor V Leiden mutation (H) [D68.51]  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]  Personal history of venous thrombosis and embolism [Z86.718]             Anticoagulation Episode Summary     INR check location:       Preferred lab:       Send INR reminders to:   [facility]    Comments:   Takes in AM      Anticoagulation Care Providers     Provider Role Specialty Phone number    Du Almaraz MD [facility] Internal Medicine [phone]            See the Encounter Report to view Anticoagulation Flowsheet and Dosing Calendar (Go to Encounters tab in chart review, and find the Anticoagulation Therapy Visit)    Dosage adjustment made based on physician directed care plan.    Luna Oakley RN

## 2019-03-11 ENCOUNTER — ANTICOAGULATION THERAPY VISIT (OUTPATIENT)
Dept: ANTICOAGULATION | Facility: CLINIC | Age: 64
End: 2019-03-11
Payer: COMMERCIAL

## 2019-03-11 DIAGNOSIS — Z86.718 PERSONAL HISTORY OF VENOUS THROMBOSIS AND EMBOLISM: ICD-10-CM

## 2019-03-11 DIAGNOSIS — D68.51 HETEROZYGOUS FACTOR V LEIDEN MUTATION (H): ICD-10-CM

## 2019-03-11 DIAGNOSIS — D68.52 PROTHROMBIN GENE MUTATION (H): ICD-10-CM

## 2019-03-11 LAB — INR POINT OF CARE: 2.3 (ref 0.86–1.14)

## 2019-03-11 PROCEDURE — 36416 COLLJ CAPILLARY BLOOD SPEC: CPT

## 2019-03-11 PROCEDURE — 99207 ZZC NO CHARGE NURSE ONLY: CPT

## 2019-03-11 PROCEDURE — 85610 PROTHROMBIN TIME: CPT | Mod: QW

## 2019-03-11 NOTE — PROGRESS NOTES
ANTICOAGULATION FOLLOW-UP CLINIC VISIT    Patient Name:  Alvin Ontiveros  Date:  3/11/2019  Contact Type:  Face to Face    SUBJECTIVE:     Patient Findings     Positives:   No Problem Findings    Comments:   Pt denies any changes or missed warfarin doses since last INR visit. Pt denies any bleeding or bruising problems.              OBJECTIVE    INR Protime   Date Value Ref Range Status   2019 2.3 (A) 0.86 - 1.14 Final       ASSESSMENT / PLAN  INR assessment THER    Recheck INR In: 6 WEEKS    INR Location Clinic      Anticoagulation Summary  As of 3/11/2019    INR goal:   2.0-3.0   TTR:   76.5 % (2.9 y)   INR used for dosin.3 (3/11/2019)   Warfarin maintenance plan:   2.5 mg (5 mg x 0.5) every Sun; 5 mg (5 mg x 1) all other days   Full warfarin instructions:   2.5 mg every Sun; 5 mg all other days   Weekly warfarin total:   32.5 mg   No change documented:   Luna Oakley RN   Plan last modified:   Luna Oakley RN (2019)   Next INR check:   2019   Priority:   INR   Target end date:       Indications    Long-term (current) use of anticoagulants [Z79.01] [Z79.01]  Heterozygous factor V Leiden mutation (H) [D68.51]  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]  Personal history of venous thrombosis and embolism [Z86.718]             Anticoagulation Episode Summary     INR check location:       Preferred lab:       Send INR reminders to:   [facility]    Comments:   Takes in AM      Anticoagulation Care Providers     Provider Role Specialty Phone number    Du Almaraz MD [facility] Internal Medicine [phone]            See the Encounter Report to view Anticoagulation Flowsheet and Dosing Calendar (Go to Encounters tab in chart review, and find the Anticoagulation Therapy Visit)    Dosage adjustment made based on physician directed care plan.    Luna Oakley RN

## 2019-04-09 DIAGNOSIS — I10 ESSENTIAL HYPERTENSION: ICD-10-CM

## 2019-04-10 NOTE — TELEPHONE ENCOUNTER
"Requested Prescriptions   Pending Prescriptions Disp Refills     losartan (COZAAR) 100 MG tablet [Pharmacy Med Name: LOSARTAN TABS 100MG] 90 tablet 1     Sig: TAKE 1 TABLET DAILY   Last Written Prescription Date:  10/29/2018  Last Fill Quantity: 90,  # refills: 1   Last office visit: 9/28/2018 with prescribing provider:     Future Office Visit:   Next 5 appointments (look out 90 days)    Jul 09, 2019  7:00 AM CDT  SHORT with Du Almaraz MD  [facility] ([facility]) [address]  [facility] [address]  [phone]           Angiotensin-II Receptors Passed - 4/9/2019 11:42 PM        Passed - Blood pressure under 140/90 in past 12 months     BP Readings from Last 3 Encounters:   10/03/18 122/70   09/28/18 122/70   06/07/18 130/72                 Passed - Recent (12 mo) or future (30 days) visit within the authorizing provider's specialty     Patient had office visit in the last 12 months or has a visit in the next 30 days with authorizing provider or within the authorizing provider's specialty.  See \"Patient Info\" tab in inbasket, or \"Choose Columns\" in Meds & Orders section of the refill encounter.              Passed - Medication is active on med list        Passed - Patient is age 18 or older        Passed - Normal serum creatinine on file in past 12 months     Recent Labs   Lab Test 05/30/18  0830   CR 0.88             Passed - Normal serum potassium on file in past 12 months     Recent Labs   Lab Test 05/30/18  0830   POTASSIUM 4.0                    "

## 2019-04-11 RX ORDER — LOSARTAN POTASSIUM 100 MG/1
TABLET ORAL
Qty: 90 TABLET | Refills: 0 | Status: SHIPPED | OUTPATIENT
Start: 2019-04-11 | End: 2019-07-09

## 2019-04-22 ENCOUNTER — ANTICOAGULATION THERAPY VISIT (OUTPATIENT)
Dept: ANTICOAGULATION | Facility: CLINIC | Age: 64
End: 2019-04-22
Payer: COMMERCIAL

## 2019-04-22 DIAGNOSIS — D68.51 HETEROZYGOUS FACTOR V LEIDEN MUTATION (H): ICD-10-CM

## 2019-04-22 DIAGNOSIS — D68.52 PROTHROMBIN GENE MUTATION (H): ICD-10-CM

## 2019-04-22 DIAGNOSIS — Z86.718 PERSONAL HISTORY OF VENOUS THROMBOSIS AND EMBOLISM: ICD-10-CM

## 2019-04-22 LAB — INR POINT OF CARE: 3.3 (ref 0.86–1.14)

## 2019-04-22 PROCEDURE — 99207 ZZC NO CHARGE NURSE ONLY: CPT

## 2019-04-22 PROCEDURE — 85610 PROTHROMBIN TIME: CPT | Mod: QW

## 2019-04-22 PROCEDURE — 36416 COLLJ CAPILLARY BLOOD SPEC: CPT

## 2019-04-22 NOTE — PROGRESS NOTES
ANTICOAGULATION FOLLOW-UP CLINIC VISIT    Patient Name:  Alvin Ontiveros  Date:  4/22/2019  Contact Type:  Face to Face    SUBJECTIVE:     Patient Findings     Comments:   The patient was assessed for diet, medication, and activity level changes, missed or extra doses, bruising or bleeding, with no problem findings.  Patient denies any changes or missed warfarin doses since last INR check. Patient denies any bleeding or bruising problems.   Patient denies any identifiable changes that caused the supratherapeutic INR.              OBJECTIVE    INR Protime   Date Value Ref Range Status   04/22/2019 3.3 (A) 0.86 - 1.14 Final       ASSESSMENT / PLAN  INR assessment SUPRA    Recheck INR In: 4 WEEKS    INR Location Clinic      Anticoagulation Summary  As of 4/22/2019    INR goal:   2.0-3.0   TTR:   76.2 % (3 y)   INR used for dosing:   3.3! 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]  Personal history of venous thrombosis and embolism [Z86.718]             Anticoagulation Episode Summary     INR check location:       Preferred lab:       Send INR reminders to:   [facility]    Comments:   Takes in AM      Anticoagulation Care Providers     Provider Role Specialty Phone number    Du Almaraz MD [facility] Internal Medicine [phone]            See the Encounter Report to view Anticoagulation Flowsheet and Dosing Calendar (Go to Encounters tab in chart review, and find the Anticoagulation Therapy  Visit)    Dosage adjustment made based on physician directed care plan.    Luna Oakley RN

## 2019-05-20 ENCOUNTER — ANTICOAGULATION THERAPY VISIT (OUTPATIENT)
Dept: ANTICOAGULATION | Facility: CLINIC | Age: 64
End: 2019-05-20
Payer: COMMERCIAL

## 2019-05-20 DIAGNOSIS — Z86.718 PERSONAL HISTORY OF VENOUS THROMBOSIS AND EMBOLISM: ICD-10-CM

## 2019-05-20 DIAGNOSIS — D68.51 HETEROZYGOUS FACTOR V LEIDEN MUTATION (H): ICD-10-CM

## 2019-05-20 DIAGNOSIS — D68.52 PROTHROMBIN GENE MUTATION (H): ICD-10-CM

## 2019-05-20 LAB — INR POINT OF CARE: 2.9 (ref 0.86–1.14)

## 2019-05-20 PROCEDURE — 36416 COLLJ CAPILLARY BLOOD SPEC: CPT

## 2019-05-20 PROCEDURE — 99207 ZZC NO CHARGE NURSE ONLY: CPT

## 2019-05-20 PROCEDURE — 85610 PROTHROMBIN TIME: CPT | Mod: QW

## 2019-05-20 NOTE — PROGRESS NOTES
ANTICOAGULATION FOLLOW-UP CLINIC VISIT    Patient Name:  Alvin Ontiveros  Date:  2019  Contact Type:  Face to Face    SUBJECTIVE:  Patient Findings     Comments:   The patient was assessed for diet, medication, and activity level changes, missed or extra doses, bruising or bleeding, with no problem findings.          Clinical Outcomes     Negatives:   Major bleeding event, Thromboembolic event, Anticoagulation-related hospital admission, Anticoagulation-related ED visit, Anticoagulation-related fatality    Comments:   The patient was assessed for diet, medication, and activity level changes, missed or extra doses, bruising or bleeding, with no problem findings.             OBJECTIVE    INR Protime   Date Value Ref Range Status   2019 2.9 (A) 0.86 - 1.14 Final       ASSESSMENT / PLAN  INR assessment THER    Recheck INR In: 6 WEEKS    INR Location Clinic      Anticoagulation Summary  As of 2019    INR goal:   2.0-3.0   TTR:   74.9 % (3.1 y)   INR used for dosin.9 (2019)   Warfarin maintenance plan:   2.5 mg (5 mg x 0.5) every Sun; 5 mg (5 mg x 1) all other days   Full warfarin instructions:   2.5 mg every Sun; 5 mg all other days   Weekly warfarin total:   32.5 mg   No change documented:   Luna Oakley RN   Plan last modified:   Luna Oakley RN (2019)   Next INR check:   2019   Priority:   INR   Target end date:       Indications    Long-term (current) use of anticoagulants [Z79.01] [Z79.01]  Heterozygous factor V Leiden mutation (H) [D68.51]  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]  Personal history of venous thrombosis and embolism [Z86.718]             Anticoagulation Episode Summary     INR check location:       Preferred lab:       Send INR reminders to:   [facility]    Comments:   Takes in AM      Anticoagulation Care Providers     Provider Role Specialty Phone number    Du Almaraz MD [facility] Internal Medicine [phone]            See the Encounter  Report to view Anticoagulation Flowsheet and Dosing Calendar (Go to Encounters tab in chart review, and find the Anticoagulation Therapy Visit)    Dosage adjustment made based on physician directed care plan.    Luna Oakley RN

## 2019-06-23 DIAGNOSIS — I10 ESSENTIAL HYPERTENSION: ICD-10-CM

## 2019-06-24 RX ORDER — HYDROCHLOROTHIAZIDE 25 MG/1
TABLET ORAL
Qty: 90 TABLET | Refills: 0 | Status: SHIPPED | OUTPATIENT
Start: 2019-06-24 | End: 2019-07-16

## 2019-06-24 NOTE — TELEPHONE ENCOUNTER
"Requested Prescriptions   Pending Prescriptions Disp Refills     hydrochlorothiazide (HYDRODIURIL) 25 MG tablet [Pharmacy Med Name: HYDROCHLOROTHIAZIDE TAB 25MG]  Last Written Prescription Date:  6/7/2018  Last Fill Quantity: 90,  # refills: 3   Last office visit: 9/28/2018 with prescribing provider:     Future Office Visit:   Next 5 appointments (look out 90 days)    Jul 16, 2019  7:20 AM CDT  [facility] [address]  [facility] [address]  [phone]        90 tablet 3     Sig: TAKE 1 TABLET DAILY       Diuretics (Including Combos) Protocol Failed - 6/23/2019 11:35 PM        Failed - Normal serum creatinine on file in past 12 months     Recent Labs   Lab Test 05/30/18  0830   CR 0.88              Failed - Normal serum potassium on file in past 12 months     Recent Labs   Lab Test 05/30/18  0830   POTASSIUM 4.0                    Failed - Normal serum sodium on file in past 12 months     Recent Labs   Lab Test 05/30/18  0830                 Passed - Blood pressure under 140/90 in past 12 months     BP Readings from Last 3 Encounters:   10/03/18 122/70   09/28/18 122/70   06/07/18 130/72                 Passed - Recent (12 mo) or future (30 days) visit within the authorizing provider's specialty     Patient had office visit in the last 12 months or has a visit in the next 30 days with authorizing provider or within the authorizing provider's specialty.  See \"Patient Info\" tab in inbasket, or \"Choose Columns\" in Meds & Orders section of the refill encounter.              Passed - Medication is active on med list        Passed - Patient is age 18 or older        "

## 2019-06-24 NOTE — TELEPHONE ENCOUNTER
Medication is being filled for 1 time refill only due to:  Patient needs labs NA, K+. Patient needs to be seen because it has been more than one year since last visit.   Patient has appointment scheduled.    Next 5 appointments (look out 90 days)    Jul 16, 2019  7:20 AM CDT  SHORT with Du Almaraz MD  [facility] ([facility]) [address]  [facility] [id number] [phone]

## 2019-06-25 DIAGNOSIS — R73.03 PREDIABETES: ICD-10-CM

## 2019-06-25 DIAGNOSIS — E78.5 HYPERLIPIDEMIA LDL GOAL <130: ICD-10-CM

## 2019-06-25 DIAGNOSIS — Z12.5 SPECIAL SCREENING FOR MALIGNANT NEOPLASM OF PROSTATE: ICD-10-CM

## 2019-06-25 LAB
ALBUMIN SERPL-MCNC: 3.6 G/DL (ref 3.4–5)
ALP SERPL-CCNC: 57 U/L (ref 40–150)
ALT SERPL W P-5'-P-CCNC: 28 U/L (ref 0–70)
ANION GAP SERPL CALCULATED.3IONS-SCNC: 7 MMOL/L (ref 3–14)
AST SERPL W P-5'-P-CCNC: 17 U/L (ref 0–45)
BILIRUB SERPL-MCNC: 0.7 MG/DL (ref 0.2–1.3)
BUN SERPL-MCNC: 22 MG/DL (ref 7–30)
CALCIUM SERPL-MCNC: 10 MG/DL (ref 8.5–10.1)
CHLORIDE SERPL-SCNC: 106 MMOL/L (ref 94–109)
CHOLEST SERPL-MCNC: 189 MG/DL
CO2 SERPL-SCNC: 28 MMOL/L (ref 20–32)
CREAT SERPL-MCNC: 0.89 MG/DL (ref 0.66–1.25)
GFR SERPL CREATININE-BSD FRML MDRD: 90 ML/MIN/{1.73_M2}
GLUCOSE SERPL-MCNC: 114 MG/DL (ref 70–99)
HBA1C MFR BLD: 5.6 % (ref 0–5.6)
HDLC SERPL-MCNC: 62 MG/DL
LDLC SERPL CALC-MCNC: 106 MG/DL
NONHDLC SERPL-MCNC: 127 MG/DL
POTASSIUM SERPL-SCNC: 4.5 MMOL/L (ref 3.4–5.3)
PROT SERPL-MCNC: 6.8 G/DL (ref 6.8–8.8)
PSA SERPL-ACNC: 0.94 UG/L (ref 0–4)
SODIUM SERPL-SCNC: 141 MMOL/L (ref 133–144)
TRIGL SERPL-MCNC: 105 MG/DL

## 2019-06-25 PROCEDURE — G0103 PSA SCREENING: HCPCS | Performed by: INTERNAL MEDICINE

## 2019-06-25 PROCEDURE — 36415 COLL VENOUS BLD VENIPUNCTURE: CPT | Performed by: INTERNAL MEDICINE

## 2019-06-25 PROCEDURE — 83036 HEMOGLOBIN GLYCOSYLATED A1C: CPT | Performed by: INTERNAL MEDICINE

## 2019-06-25 PROCEDURE — 80061 LIPID PANEL: CPT | Performed by: INTERNAL MEDICINE

## 2019-06-25 PROCEDURE — 80053 COMPREHEN METABOLIC PANEL: CPT | Performed by: INTERNAL MEDICINE

## 2019-07-01 ENCOUNTER — ANTICOAGULATION THERAPY VISIT (OUTPATIENT)
Dept: ANTICOAGULATION | Facility: CLINIC | Age: 64
End: 2019-07-01
Payer: COMMERCIAL

## 2019-07-01 DIAGNOSIS — D68.52 PROTHROMBIN GENE MUTATION (H): ICD-10-CM

## 2019-07-01 DIAGNOSIS — Z86.718 PERSONAL HISTORY OF VENOUS THROMBOSIS AND EMBOLISM: ICD-10-CM

## 2019-07-01 DIAGNOSIS — D68.51 HETEROZYGOUS FACTOR V LEIDEN MUTATION (H): ICD-10-CM

## 2019-07-01 DIAGNOSIS — Z79.01 LONG TERM CURRENT USE OF ANTICOAGULANT THERAPY: ICD-10-CM

## 2019-07-01 LAB — INR POINT OF CARE: 1.7 (ref 0.86–1.14)

## 2019-07-01 PROCEDURE — 99207 ZZC NO CHARGE NURSE ONLY: CPT

## 2019-07-01 PROCEDURE — 36416 COLLJ CAPILLARY BLOOD SPEC: CPT

## 2019-07-01 PROCEDURE — 85610 PROTHROMBIN TIME: CPT | Mod: QW

## 2019-07-01 NOTE — PROGRESS NOTES
ANTICOAGULATION FOLLOW-UP CLINIC VISIT    Patient Name:  Alvin Ontiveros  Date:  2019  Contact Type:  Face to Face    SUBJECTIVE:  Patient Findings     Positives:   Missed doses (Patient reports has been taking 2.5 mg warfarin Sun, Wed  and Fri instead of  only. Will resume maintenance dose. )    Comments:   The patient was assessed for diet, medication, and activity level changes, extra doses, bruising or bleeding, with no problem findings.            Clinical Outcomes     Comments:   The patient was assessed for diet, medication, and activity level changes, extra doses, bruising or bleeding, with no problem findings.               OBJECTIVE    INR Protime   Date Value Ref Range Status   2019 1.7 (A) 0.86 - 1.14 Final       ASSESSMENT / PLAN  INR assessment SUB    Recheck INR In: 2 WEEKS    INR Location Clinic      Anticoagulation Summary  As of 2019    INR goal:   2.0-3.0   TTR:   75.0 % (3.2 y)   INR used for dosin.7! 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]  Personal history of venous thrombosis and embolism [Z86.718]             Anticoagulation Episode Summary     INR check location:       Preferred lab:       [facility]    Comments:   Takes in AM      Anticoagulation Care Providers     Provider Role Specialty Phone number    Du Almaraz MD [facility] Internal Medicine [phone]            See the Encounter Report to view  Anticoagulation Flowsheet and Dosing Calendar (Go to Encounters tab in chart review, and find the Anticoagulation Therapy Visit)    Dosage adjustment made based on physician directed care plan.    Luna Oakley RN

## 2019-07-07 DIAGNOSIS — K21.9 GASTROESOPHAGEAL REFLUX DISEASE WITHOUT ESOPHAGITIS: ICD-10-CM

## 2019-07-08 NOTE — TELEPHONE ENCOUNTER
"Requested Prescriptions   Pending Prescriptions Disp Refills     omeprazole (PRILOSEC) 20 MG DR capsule [Pharmacy Med Name: OMEPRAZOLE DR CAPS 20MG] 90 capsule 3     Sig: TAKE 1 CAPSULE DAILY   Last Written Prescription Date:  06/07/2018  Last Fill Quantity: 90,  # refills: 03   Last office visit: 9/28/2018 with prescribing provider:     Future Office Visit:   Next 5 appointments (look out 90 days)    Jul 16, 2019  7:20 AM CDT  SHORT with Du [facility]az MD  [facility] [address]  [facility] [address]  [phone]           PPI Protocol Passed - 7/7/2019 11:47 PM        Passed - Not on Clopidogrel (unless Pantoprazole ordered)        Passed - No diagnosis of osteoporosis on record        Passed - Recent (12 mo) or future (30 days) visit within the authorizing provider's specialty     Patient had office visit in the last 12 months or has a visit in the next 30 days with authorizing provider or within the authorizing provider's specialty.  See \"Patient Info\" tab in inbasket, or \"Choose Columns\" in Meds & Orders section of the refill encounter.              Passed - Medication is active on med list        Passed - Patient is age 18 or older        "

## 2019-07-11 DIAGNOSIS — D68.51 HETEROZYGOUS FACTOR V LEIDEN MUTATION (H): ICD-10-CM

## 2019-07-12 RX ORDER — WARFARIN SODIUM 5 MG/1
TABLET ORAL
Qty: 90 TABLET | Refills: 0 | Status: SHIPPED | OUTPATIENT
Start: 2019-07-12 | End: 2019-10-13

## 2019-07-12 NOTE — TELEPHONE ENCOUNTER
"Requested Prescriptions   Pending Prescriptions Disp Refills     warfarin (COUMADIN) 5 MG tablet [Pharmacy Med Name: WARFARIN TABS 5MG] 75 tablet 1     Sig: TAKE 1 TABLET DAILY EXCEPT TAKE ONE-HALF (1/2) TABLET ON SUNDAY AND WEDNESDAY OR AS INSTRUCTED BASED ON INR RESULT       Vitamin K Antagonists Failed - 7/11/2019 11:31 PM        Failed - INR is within goal in the past 6 weeks     Confirm INR is within goal in the past 6 weeks.     Recent Labs   Lab Test 07/01/19   INR 1.7*                       Passed - Recent (12 mo) or future (30 days) visit within the authorizing provider's specialty     Patient had office visit in the last 12 months or has a visit in the next 30 days with authorizing provider or within the authorizing provider's specialty.  See \"Patient Info\" tab in inbasket, or \"Choose Columns\" in Meds & Orders section of the refill encounter.              Passed - Medication is active on med list        Passed - Patient is 18 years of age or older        Last office visit 9/28/18.  Warfarin dosing is managed by INR Clinic.  Prescription approved per [facility] Refill Protocol.  Kari Lizarraga RN    "

## 2019-07-16 ENCOUNTER — ANTICOAGULATION THERAPY VISIT (OUTPATIENT)
Dept: ANTICOAGULATION | Facility: CLINIC | Age: 64
End: 2019-07-16
Payer: COMMERCIAL

## 2019-07-16 ENCOUNTER — OFFICE VISIT (OUTPATIENT)
Dept: INTERNAL MEDICINE | Facility: CLINIC | Age: 64
End: 2019-07-16
Payer: COMMERCIAL

## 2019-07-16 VITALS
RESPIRATION RATE: 20 BRPM | OXYGEN SATURATION: 97 % | HEIGHT: 73 IN | DIASTOLIC BLOOD PRESSURE: 74 MMHG | SYSTOLIC BLOOD PRESSURE: 134 MMHG | WEIGHT: 234 LBS | TEMPERATURE: 98 F | BODY MASS INDEX: 31.01 KG/M2 | HEART RATE: 72 BPM

## 2019-07-16 DIAGNOSIS — R20.0 NUMBNESS OF TOES: ICD-10-CM

## 2019-07-16 DIAGNOSIS — D68.51 HETEROZYGOUS FACTOR V LEIDEN MUTATION (H): ICD-10-CM

## 2019-07-16 DIAGNOSIS — E78.5 HYPERLIPIDEMIA LDL GOAL <130: ICD-10-CM

## 2019-07-16 DIAGNOSIS — Z86.718 PERSONAL HISTORY OF VENOUS THROMBOSIS AND EMBOLISM: ICD-10-CM

## 2019-07-16 DIAGNOSIS — K21.9 GASTROESOPHAGEAL REFLUX DISEASE WITHOUT ESOPHAGITIS: ICD-10-CM

## 2019-07-16 DIAGNOSIS — D68.52 PROTHROMBIN GENE MUTATION (H): ICD-10-CM

## 2019-07-16 DIAGNOSIS — Z79.01 LONG TERM CURRENT USE OF ANTICOAGULANT THERAPY: ICD-10-CM

## 2019-07-16 DIAGNOSIS — D68.52 PROTHROMBIN GENE MUTATION (H): Primary | ICD-10-CM

## 2019-07-16 DIAGNOSIS — M72.0 DUPUYTREN'S CONTRACTURE OF RIGHT HAND: ICD-10-CM

## 2019-07-16 DIAGNOSIS — I10 ESSENTIAL HYPERTENSION: ICD-10-CM

## 2019-07-16 DIAGNOSIS — R73.03 PREDIABETES: ICD-10-CM

## 2019-07-16 LAB — INR POINT OF CARE: 3.1 (ref 0.86–1.14)

## 2019-07-16 PROCEDURE — 36416 COLLJ CAPILLARY BLOOD SPEC: CPT

## 2019-07-16 PROCEDURE — 99207 ZZC NO CHARGE NURSE ONLY: CPT

## 2019-07-16 PROCEDURE — 85610 PROTHROMBIN TIME: CPT | Mod: QW

## 2019-07-16 PROCEDURE — 99214 OFFICE O/P EST MOD 30 MIN: CPT | Performed by: INTERNAL MEDICINE

## 2019-07-16 RX ORDER — FOLIC ACID 1 MG/1
1000 TABLET ORAL DAILY
Qty: 100 TABLET | Refills: 3 | Status: SHIPPED | OUTPATIENT
Start: 2019-07-16 | End: 2020-01-14

## 2019-07-16 RX ORDER — LOSARTAN POTASSIUM 100 MG/1
100 TABLET ORAL DAILY
Qty: 90 TABLET | Refills: 3 | Status: SHIPPED | OUTPATIENT
Start: 2019-07-16 | End: 2020-01-14

## 2019-07-16 RX ORDER — VALSARTAN 160 MG/1
160 TABLET ORAL DAILY
Qty: 90 TABLET | Refills: 3 | Status: SHIPPED | OUTPATIENT
Start: 2019-07-16 | End: 2019-07-17 | Stop reason: ALTCHOICE

## 2019-07-16 RX ORDER — PRAVASTATIN SODIUM 40 MG
TABLET ORAL
Qty: 90 TABLET | Refills: 3 | Status: SHIPPED | OUTPATIENT
Start: 2019-07-16 | End: 2020-01-14

## 2019-07-16 RX ORDER — HYDROCHLOROTHIAZIDE 25 MG/1
25 TABLET ORAL DAILY
Qty: 90 TABLET | Refills: 3 | Status: SHIPPED | OUTPATIENT
Start: 2019-07-16 | End: 2020-01-14

## 2019-07-16 ASSESSMENT — MIFFLIN-ST. JEOR: SCORE: 1897.36

## 2019-07-16 NOTE — PATIENT INSTRUCTIONS
"Early changes of palmar tendon contracture or \"Dupuytren's\" contracture.     Glucose still in \"prediabetes\" range--keep exercising and watching carbohydrate portions.     Blood pressure appears satisfactorily controlled. If we need to make a future change, we might consider replacing the Losartan with a slightly more potent medication in the same class (ARB or angiotensin receptor blocker--examples would include irbesartan or olmesartan).    LDL-Cholesterol looks fine.     With the borderline BP's today, let's see you in six months rather than in one year. Try to get fasting labs a few days in advance.   "

## 2019-07-16 NOTE — NURSING NOTE
"Vital signs:  Temp: 98  F (36.7  C) Temp src: Oral BP: 140/82 Pulse: 72   Resp: 20 SpO2: 97 %     Height: 184.2 cm (6' 0.5\") Weight: 106.1 kg (234 lb)  Estimated body mass index is 31.3 kg/m  as calculated from the following:    Height as of this encounter: 1.842 m (6' 0.5\").    Weight as of this encounter: 106.1 kg (234 lb).          "

## 2019-07-16 NOTE — PROGRESS NOTES
Subjective     Alvin Ontiveros is a 63 year old male who presents to clinic today for the following health issues:  Hypertension, hyperlipidemia, prior thromboembolism on chronic oral anticoagulation.    He points out some chronic thickening of tendons beneath the skin in his right palm. This is not yet interfering significantly with use of his hand.     He notes numbness in his right great toe, no other toes on the right or left feet.     He has tried to walk regularly, about two miles per day.   He is trying to watch his intake of carbohydrates.     HPI   Hyperlipidemia Follow-Up      Are you having any of the following symptoms? (Select all that apply)  No complaints of shortness of breath, chest pain or pressure.  No increased sweating or nausea with activity.  No left-sided neck or arm pain.  No complaints of pain in calves when walking 1-2 blocks.    Are you regularly taking any medication or supplement to lower your cholesterol?   Yes- Pravastatin    Are you having muscle aches or other side effects that you think could be caused by your cholesterol lowering medication?  No      Hypertension Follow-up      Do you check your blood pressure regularly outside of the clinic? No     Are you following a low salt diet? Yes    Are your blood pressures ever more than 140 on the top number (systolic) OR more   than 90 on the bottom number (diastolic), for example 140/90? No    Amount of exercise or physical activity: 5 days a week    Problems taking medications regularly: No    Medication side effects: none    Diet: low salt and low fat/cholesterol    Reviewed recent labs: LDL at target.   BP appears satisfactorily controlled.     Reviewed and updated as needed this visit by Provider         Review of Systems   No dyspnea or cough. No chest discomfort, dizziness or palpitations. No diarrhea, abdominal pain or rectal bleeding.   No acute problems with vision or speech, lateralizing weakness or paresthesias.    ROS: as  "above or negative for Respiratory, CV, GI, endocrine, neuro systems.       Objective    Ht 1.842 m (6' 0.5\")   BMI 30.90 kg/m    Body mass index is 30.9 kg/m .  Physical Exam   GENERAL: healthy, alert and no distress  RESP: lungs clear to auscultation - no rales, rhonchi or wheezes  CV: regular rate and rhythm, normal S1 S2, no S3 or S4, no murmur, click or rub, no peripheral edema and peripheral pulses strong  MS: Thickening of palmar fascia/tendons beneath skin on right palm. No other gross musculoskeletal defects noted, no edema  Slightly reduced monofilament sensation confined only to the right great toe.     Diagnostic Test Results:  Labs reviewed in Epic        Assessment & Plan     (D68.52) Heterozygous Prothrombin Gene Mutation  (primary encounter diagnosis)  (D68.51) Heterozygous factor V Leiden mutation (H)  (Z86.718) Personal history of venous thrombosis and embolism  No symptoms of recent thromboembolism. Continue chronic oral anticoagulation.     (R73.03) Prediabetes  Comment: Recent A1c favorable. Continue to work on non-Rx measures.   Plan: folic acid (FOLVITE) 1 MG tablet, **A1C FUTURE         6mo          (E78.5) Hyperlipidemia LDL goal <130  Comment: Recent LDL-cholesterol satisfactorily controlled. Continue current meds.   Plan: pravastatin (PRAVACHOL) 40 MG tablet,         Comprehensive metabolic panel, Lipid panel         reflex to direct LDL Fasting          (I10) Essential hypertension  Comment: BP satisfactorily controlled. Consider switching from Losartan to irbesartan or olmesartan if SBP's trend above 140.   Plan: hydrochlorothiazide (HYDRODIURIL) 25 MG tablet,        losartan (COZAAR) 100 MG tablet          (K21.9) Gastroesophageal reflux disease without esophagitis  Comment: Reflux symptoms controlled.Continue current meds.   Plan: omeprazole (PRILOSEC) 20 MG DR capsule         (R20.0) Numbness of toes  Comment: Numbness confined to one great toe. Do not suspect a systemic " "polyneuropathy.     (M72.0) Dupuytren's contracture of right hand  Comment: Discussed diagnosis, patient not interested in surgery at this time.        BMI:   Estimated body mass index is 31.3 kg/m  as calculated from the following:    Height as of this encounter: 1.842 m (6' 0.5\").    Weight as of this encounter: 106.1 kg (234 lb).           Patient Instructions   Early changes of palmar tendon contracture or \"Dupuytren's\" contracture.     Glucose still in \"prediabetes\" range--keep exercising and watching carbohydrate portions.     Blood pressure appears satisfactorily controlled. If we need to make a future change, we might consider replacing the Losartan with a slightly more potent medication in the same class (ARB or angiotensin receptor blocker--examples would include irbesartan or olmesartan).    LDL-Cholesterol looks fine.     With the borderline BP's today, let's see you in six months rather than in one year. Try to get fasting labs a few days in advance.       Return in about 6 months (around 1/16/2020) for Routine Visit.    Du Almaraz MD,   [facility]        "

## 2019-07-17 ENCOUNTER — TELEPHONE (OUTPATIENT)
Dept: INTERNAL MEDICINE | Facility: CLINIC | Age: 64
End: 2019-07-17

## 2019-07-17 NOTE — TELEPHONE ENCOUNTER
[facility] sent fax requesting clarification on duplicate therapy orders.   They received prescriptions on 7/16/19 for both Valsartan and Losartan and ask if patient should be taking both.     Per chart review, Losartan was ordered  on 9/28/18 due to Valsartan shortage. Contacted patient, he is only taking Losartan. Valsartan discontinued from medication list.     Contacted [employer] and spoke to Cristi-pharmacist. Informed him patient is only taking Losartan and to cancel order for Valsartan. Cristi confirmed directions and will discontinue Valsartan order.

## 2019-08-13 ENCOUNTER — ANTICOAGULATION THERAPY VISIT (OUTPATIENT)
Dept: ANTICOAGULATION | Facility: CLINIC | Age: 64
End: 2019-08-13
Payer: COMMERCIAL

## 2019-08-13 DIAGNOSIS — Z79.01 LONG TERM CURRENT USE OF ANTICOAGULANT THERAPY: ICD-10-CM

## 2019-08-13 DIAGNOSIS — D68.52 PROTHROMBIN GENE MUTATION (H): ICD-10-CM

## 2019-08-13 DIAGNOSIS — Z86.718 PERSONAL HISTORY OF VENOUS THROMBOSIS AND EMBOLISM: ICD-10-CM

## 2019-08-13 DIAGNOSIS — D68.51 HETEROZYGOUS FACTOR V LEIDEN MUTATION (H): ICD-10-CM

## 2019-08-13 LAB — INR POINT OF CARE: 2.3 (ref 0.86–1.14)

## 2019-08-13 PROCEDURE — 99207 ZZC NO CHARGE NURSE ONLY: CPT

## 2019-08-13 PROCEDURE — 36416 COLLJ CAPILLARY BLOOD SPEC: CPT

## 2019-08-13 PROCEDURE — 85610 PROTHROMBIN TIME: CPT | Mod: QW

## 2019-08-13 NOTE — PROGRESS NOTES
ANTICOAGULATION FOLLOW-UP CLINIC VISIT    Patient Name:  Alvin Ontiveros  Date:  2019  Contact Type:  Face to Face    SUBJECTIVE:  Patient Findings     Comments:   The patient was assessed for diet, medication, and activity level changes, missed or extra doses, bruising or bleeding, with no problem findings.          Clinical Outcomes     Negatives:   Major bleeding event, Thromboembolic event, Anticoagulation-related hospital admission, Anticoagulation-related ED visit, Anticoagulation-related fatality    Comments:   The patient was assessed for diet, medication, and activity level changes, missed or extra doses, bruising or bleeding, with no problem findings.             OBJECTIVE    INR Protime   Date Value Ref Range Status   2019 2.3 (A) 0.86 - 1.14 Final       ASSESSMENT / PLAN  INR assessment THER    Recheck INR In: 6 WEEKS    INR Location Clinic      Anticoagulation Summary  As of 2019    INR goal:   2.0-3.0   TTR:   75.2 % (3.3 y)   INR used for dosin.3 (2019)   Warfarin maintenance plan:   2.5 mg (5 mg x 0.5) every Sun; 5 mg (5 mg x 1) all other days   Full warfarin instructions:   2.5 mg every Sun; 5 mg all other days   Weekly warfarin total:   32.5 mg   No change documented:   Luna Oakley RN   Plan last modified:   Luna Oakley RN (2019)   Next INR check:   2019   Priority:   INR   Target end date:       Indications    Long-term (current) use of anticoagulants [Z79.01] [Z79.01]  Heterozygous factor V Leiden mutation (H) [D68.51]  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]  Personal history of venous thrombosis and embolism [Z86.718]             Anticoagulation Episode Summary     INR check location:       Preferred lab:       Send INR reminders to:   [facility]    Comments:   Takes in AM      Anticoagulation Care Providers     Provider Role Specialty Phone number    Du Almaraz MD [facility] Internal Medicine [phone]             See the Encounter Report to view Anticoagulation Flowsheet and Dosing Calendar (Go to Encounters tab in chart review, and find the Anticoagulation Therapy Visit)    Dosage adjustment made based on physician directed care plan.    Luna Oakley RN

## 2019-09-03 ENCOUNTER — TELEPHONE (OUTPATIENT)
Dept: INTERNAL MEDICINE | Facility: CLINIC | Age: 64
End: 2019-09-03

## 2019-09-03 DIAGNOSIS — D68.51 HETEROZYGOUS FACTOR V LEIDEN MUTATION (H): ICD-10-CM

## 2019-09-03 DIAGNOSIS — Z86.718 PERSONAL HISTORY OF VENOUS THROMBOSIS AND EMBOLISM: ICD-10-CM

## 2019-09-03 DIAGNOSIS — D68.52 PROTHROMBIN GENE MUTATION (H): ICD-10-CM

## 2019-09-03 NOTE — TELEPHONE ENCOUNTER
Patient calling and has a colonoscopy set for 9/18/2019 and he is on a blood thinner. Please advise the patient to when to stop his medication.     Ok to call and lm [phone]

## 2019-09-03 NOTE — TELEPHONE ENCOUNTER
Spoke with patient. Remote PE in 2006. Okay to hold warfarin for 5 days for colonoscopy. Bridge with Lovenox 100 mg subcutaneous q12h in interim. Faxed Rx to his pharmacy.

## 2019-09-18 ENCOUNTER — TRANSFERRED RECORDS (OUTPATIENT)
Dept: HEALTH INFORMATION MANAGEMENT | Facility: CLINIC | Age: 64
End: 2019-09-18

## 2019-09-23 ENCOUNTER — ANTICOAGULATION THERAPY VISIT (OUTPATIENT)
Dept: ANTICOAGULATION | Facility: CLINIC | Age: 64
End: 2019-09-23
Payer: COMMERCIAL

## 2019-09-23 DIAGNOSIS — D68.51 HETEROZYGOUS FACTOR V LEIDEN MUTATION (H): ICD-10-CM

## 2019-09-23 DIAGNOSIS — Z79.01 LONG TERM CURRENT USE OF ANTICOAGULANT THERAPY: ICD-10-CM

## 2019-09-23 DIAGNOSIS — Z86.718 PERSONAL HISTORY OF VENOUS THROMBOSIS AND EMBOLISM: ICD-10-CM

## 2019-09-23 DIAGNOSIS — D68.52 PROTHROMBIN GENE MUTATION (H): ICD-10-CM

## 2019-09-23 LAB — INR POINT OF CARE: 2.3 (ref 0.86–1.14)

## 2019-09-23 PROCEDURE — 99207 ZZC NO CHARGE NURSE ONLY: CPT

## 2019-09-23 PROCEDURE — 36416 COLLJ CAPILLARY BLOOD SPEC: CPT

## 2019-09-23 PROCEDURE — 85610 PROTHROMBIN TIME: CPT | Mod: QW

## 2019-09-23 NOTE — PROGRESS NOTES
ANTICOAGULATION FOLLOW-UP CLINIC VISIT    Patient Name:  Alvin Ontiveros  Date:  2019  Contact Type:  Face to Face    SUBJECTIVE:  Patient Findings     Positives:   Missed doses (Patient was holding warfarin for colonoscopy, resumed warfarin 19.), Change in medications (Patient last took Lovenox this morning, will discontinue. )    Comments:   The patient was assessed for diet, and activity level changes, extra doses, bruising or bleeding, with no problem findings.          Clinical Outcomes     Comments:   The patient was assessed for diet, and activity level changes, extra doses, bruising or bleeding, with no problem findings.             OBJECTIVE    INR Protime   Date Value Ref Range Status   2019 2.3 (A) 0.86 - 1.14 Final       ASSESSMENT / PLAN  INR assessment THER    Recheck INR In: 3 WEEKS    INR Location Clinic      Anticoagulation Summary  As of 2019    INR goal:   2.0-3.0   TTR:   76.0 % (3.4 y)   INR used for dosin.3 (2019)   Warfarin maintenance plan:   2.5 mg (5 mg x 0.5) every Sun; 5 mg (5 mg x 1) all other days   Full warfarin instructions:   2.5 mg every Sun; 5 mg all other days   Weekly warfarin total:   32.5 mg   No change documented:   Luna Oakley, RN   Plan last modified:   Luna Oakley RN (2019)   Next INR check:   10/14/2019   Priority:   INR   Target end date:       Indications    Long-term (current) use of anticoagulants [Z79.01] [Z79.01]  Heterozygous factor V Leiden mutation (H) [D68.51]  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]  Personal history of venous thrombosis and embolism [Z86.718]             Anticoagulation Episode Summary     INR check location:       Preferred lab:       Send INR reminders to:   [facility]    Comments:   Takes in AM      Anticoagulation Care Providers     Provider Role Specialty Phone number    Du Almaraz MD [facility] Internal Medicine [phone]            See the Encounter Report  to view Anticoagulation Flowsheet and Dosing Calendar (Go to Encounters tab in chart review, and find the Anticoagulation Therapy Visit)    Dosage adjustment made based on physician directed care plan.    Luna Oakley RN

## 2019-09-27 ENCOUNTER — NURSE TRIAGE (OUTPATIENT)
Dept: INTERNAL MEDICINE | Facility: CLINIC | Age: 64
End: 2019-09-27

## 2019-09-27 NOTE — TELEPHONE ENCOUNTER
Patient is calling because he is having constipation ever since his Colonoscopy on  9/18/19. He is having to strain and even has some bleeding. Please advise.

## 2019-09-27 NOTE — TELEPHONE ENCOUNTER
Contacted patient. Constipation started following his colonoscopy. He had occasional constipation in the past, but never lasted as long as this.     He is having occasional bowel movements, but they are very hard, causing him to strain and seeing a small amount with blood in stool. Blood has occured with every bowel movement since colonoscopy. The blood is mixed with the stool, not seeing any clots. He is on Coumadin and INR was at goal on Monday. No bleeding between stools and denies abdominal pain.     Reviewed recommendations with patient over the phone. Patient is requesting these be forwarded to him via e-mail - [email or web address]. Patient instructions printed and e-mailed to patient.    Additional Information    Negative: Patient sounds very sick or weak to the triager    Negative: Constant abdominal pain lasting > 2 hours    Negative: Vomiting bile (green color)    Negative: Vomiting and abdomen looks much more swollen than usual    Negative: Rectal pain or fullness from fecal impaction (rectum full of stool) and NOT better after SITZ bath, suppository or enema    Negative: Abdomen is more swollen than usual    Negative: Last bowel movement (BM) > 4 days ago    Negative: Leaking stool    Negative: Intermittent mild abdominal pain and fever    Negative: Unable to have a bowel movement (BM) without manually removing stool (using finger to pull out stool or perform disimpaction)    Negative: Unable to have a bowel movement (BM) without using a laxative, suppository, or enema    Negative: Constipation persists > 1 week and no improvement after using CARE ADVICE    Negative: Weight loss greater than 10 pounds (5 kg) and not dieting    Negative: Pencil-like, narrow stools    Negative: Patient wants to be seen    Mild constipation    Negative: Uses laxative (e.g., PEG / Miralax. milk of magnesia) or enema more than once a month    Negative: Constipation is a recurrent ongoing problem (i.e., < 3 BMs / week or  "straining > 25% of the time)    Answer Assessment - Initial Assessment Questions  1. STOOL PATTERN OR FREQUENCY: \"How often do you pass bowel movements (BMs)?\"  (Normal range: tid to q 3 days)  \"When was the last BM passed?\"        Today, but only had a small amount - before today last BM was on 9/24/19  2. STRAINING: \"Do you have to strain to have a BM?\"       yes  3. RECTAL PAIN: \"Does your rectum hurt when the stool comes out?\" If so, ask: \"Do you have hemorrhoids? How bad is the pain?\"  (Scale 1-10; or mild, moderate, severe)      Yes, mild pain  4. STOOL COMPOSITION: \"Are the stools hard?\"       yes  5. BLOOD ON STOOLS: \"Has there been any blood on the toilet tissue or on the surface of the BM?\" If so, ask: \"When was the last time?\"       Yes with each stool since colonoscopy - patient is on Coumadin, INR at goal on 9/23/19  6. CHRONIC CONSTIPATION: \"Is this a new problem for you?\"  If no, ask: How long have you had this problem?\" (days, weeks, months)       No, started after colonoscopy about 10 days ago  7. CHANGES IN DIET: \"Have there been any recent changes in your diet?\"       No, but has not been drinking a lot of water  8. MEDICATIONS: \"Have you been taking any new medications?\"      no  9. LAXATIVES: \"Have you been using any laxatives or enemas?\"  If yes, ask \"What, how often, and when was the last time?\"      no  10. CAUSE: \"What do you think is causing the constipation?\"         unsure  11. OTHER SYMPTOMS: \"Do you have any other symptoms?\" (e.g., abdominal pain, fever, vomiting)        none  12. PREGNANCY: \"Is there any chance you are pregnant?\" \"When was your last menstrual period?\"        n/a    Protocols used: CONSTIPATION-A-OH      "

## 2019-09-27 NOTE — PATIENT INSTRUCTIONS
Patient Education     Treating Constipation    Constipation is a common and often uncomfortable problem. Constipation means you have bowel movements fewer than 3 times per week, or strain to pass hard, dry stool. It can last a short time. Or it can be a problem that never seems to go away. The good news is that it can often be treated and controlled.  Eat more fiber  One of the best ways to help treat constipation is to increase your fiber intake. You can do this either through diet or by using fiber supplements. Fiber (in whole grains, fruits, and vegetables) adds bulk and absorbs water to soften the stool. This helps the stool pass through the colon more easily. When you increase your fiber intake, do it slowly to avoid side effects such as bloating. Also increase the amount of water that you drink. Eating more of the following foods can add fiber to your diet.    High-fiber cereals    Whole grains, bran, and brown rice    Vegetables such as carrots, broccoli, and greens    Fresh fruits (especially apples, pears, and dried fruits like raisins and apricots)    Nuts and legumes (especially beans such as lentils, kidney beans, and lima beans)  Get physically active  Exercise helps improve the working of your colon which helps ease constipation. Try to get some physical activity every day. If you haven t been active for a while, talk to your healthcare provider before starting again.  Laxatives  Your healthcare provider may suggest an over-the-counter product to help ease your constipation. He or she may suggest the use of bulk-forming agents or laxatives. The use of laxatives, if used as directed, is common and safe. Follow directions carefully when using them. See your healthcare provider for new-onset constipation, or long-term constipation, to rule out other causes such as medicines or thyroid disease.  Date Last Reviewed: 7/1/2016 2000-2018 [employer]. [address], [place]  [address]. All rights reserved. This information is not intended as a substitute for professional medical care. Always follow your healthcare professional's instructions.           Patient Education     Constipation (Adult)  Constipation means that you have bowel movements that are less frequent than usual. Stools often become very hard and difficult to pass.  Constipation is very common. At some point in life, it affects almost everyone. Since everyone's bowel habits are different, what is constipation to one person may not be to another. Your healthcare provider may do tests to diagnose constipation. It depends on what he or she finds when evaluating you.    Symptoms of constipation include:    Abdominal pain    Bloating    Vomiting    Painful bowel movements    Itching, swelling, bleeding, or pain around the anus  Causes  Constipation can have many causes. These include:    Diet low in fiber    Too much dairy    Not drinking enough liquids    Lack of exercise or physical activity (especially true for older adults)    Changes in lifestyle or daily routine, including pregnancy, aging, work, and travel    Frequent use or misuse of laxatives    Ignoring the urge to have a bowel movement or delaying it until later    Medicines, such as certain prescription pain medicines, iron supplements, antacids, certain antidepressants, and calcium supplements    Diseases like irritable bowel syndrome, bowel obstructions, stroke, diabetes, thyroid disease, Parkinson disease, hemorrhoids, and colon cancer  Complications  Potential complications of constipation can include:    Hemorrhoids    Rectal bleeding from hemorrhoids or anal fissures (skin tears)    Hernias    Dependency on laxatives    Chronic constipation    Fecal impaction, a severe form of constipation in which a large amount of hard stool is in your rectum that you can't pass    Bowel obstruction or perforation  Home care  All treatment should be done after talking with your  healthcare provider. This is especially true if you have another medical problems, are taking prescription medicines, or are an older adult. Treatment most often involves lifestyle changes. You may also need medicines. Your healthcare provider will tell you which will work best for you. Follow the advice below to help avoid this problem in the future.  Lifestyle changes  These lifestyle changes can help prevent constipation:    Diet. Eat a high-fiber diet, with fresh fruit and vegetables, and reduce dairy intake, meats, and processed foods    Fluids. It's important to get enough fluids each day. Drink plenty of water when you eat more fiber. If you are on diet that limits the amount of fluid you can have, talk about this with your healthcare provider.    Regular exercise. Check with your healthcare provider first.  Medicines  Take any medicines as directed. Some laxatives are safe to use only every now and then. Others can be taken on a regular basis. While laxatives don't cause bowel dependence, they are treating the symptoms. So your constipation may return if you don't make other changes. Talk with your healthcare provider or pharmacist if you have questions.  Prescription pain medicines can cause constipation. If you are taking this kind of medicine, ask your healthcare provider if you should also take a stool softener.  Medicines you may take to treat constipation include-try in the order listed:  1. Fiber supplements (example: Metamucil)  2. Stool softeners (examples: Colace or Senna)  3. Laxatives (examples: Miralax, Milk of Magnesia)  4. Rectal suppositories (examples: Dulcolax, Glycerin suppositories)   5. Enemas (example: Fleet enema)  Follow-up care  Follow up with your healthcare provider if symptoms don't get better in the next few days. You may need to have more tests or see a specialist.  Call 911  Call 911 if any of these occur:    Trouble breathing    Stiff, rigid abdomen that is severely painful to  touch    Confusion    Fainting or loss of consciousness    Rapid heart rate    Chest pain  When to seek medical advice  Call your healthcare provider right away if any of these occur:    Fever of 100.4 F (38 C) or higher, or as directed by your healthcare provider    Failure to resume normal bowel movements    Pain in your abdomen or back gets worse    Nausea or vomiting    Swelling in your abdomen    Blood in the stool    Black, tarry stool    Involuntary weight loss    Weakness  Date Last Reviewed: 6/1/2018 2000-2018 [employer]. [address] [place], [place] [id number]. All rights reserved. This information is not intended as a substitute for professional medical care. Always follow your healthcare professional's instructions.

## 2019-10-10 ENCOUNTER — OFFICE VISIT (OUTPATIENT)
Dept: INTERNAL MEDICINE | Facility: CLINIC | Age: 64
End: 2019-10-10
Payer: COMMERCIAL

## 2019-10-10 ENCOUNTER — ANCILLARY PROCEDURE (OUTPATIENT)
Dept: GENERAL RADIOLOGY | Facility: CLINIC | Age: 64
End: 2019-10-10
Attending: INTERNAL MEDICINE
Payer: COMMERCIAL

## 2019-10-10 VITALS
HEART RATE: 72 BPM | RESPIRATION RATE: 16 BRPM | DIASTOLIC BLOOD PRESSURE: 84 MMHG | TEMPERATURE: 98.7 F | SYSTOLIC BLOOD PRESSURE: 134 MMHG | OXYGEN SATURATION: 96 % | BODY MASS INDEX: 31.5 KG/M2 | WEIGHT: 235.5 LBS

## 2019-10-10 DIAGNOSIS — S69.91XA THUMB INJURY, RIGHT, INITIAL ENCOUNTER: Primary | ICD-10-CM

## 2019-10-10 DIAGNOSIS — S69.91XA THUMB INJURY, RIGHT, INITIAL ENCOUNTER: ICD-10-CM

## 2019-10-10 PROCEDURE — 73140 X-RAY EXAM OF FINGER(S): CPT | Mod: RT

## 2019-10-10 PROCEDURE — 99214 OFFICE O/P EST MOD 30 MIN: CPT | Performed by: INTERNAL MEDICINE

## 2019-10-10 RX ORDER — HYDROCODONE BITARTRATE AND ACETAMINOPHEN 5; 325 MG/1; MG/1
1 TABLET ORAL EVERY 6 HOURS PRN
Qty: 18 TABLET | Refills: 0 | Status: SHIPPED | OUTPATIENT
Start: 2019-10-10 | End: 2020-01-14
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
## 2019-10-10 NOTE — PATIENT INSTRUCTIONS
PLAN:                                                      1.  MEDICATIONS:  Continue current medications without change  2.  Hydrocodone alternating with low dose ibuprofen or tylenol as needed

## 2019-10-14 ENCOUNTER — ANTICOAGULATION THERAPY VISIT (OUTPATIENT)
Dept: ANTICOAGULATION | Facility: CLINIC | Age: 64
End: 2019-10-14
Payer: COMMERCIAL

## 2019-10-14 DIAGNOSIS — D68.52 PROTHROMBIN GENE MUTATION (H): ICD-10-CM

## 2019-10-14 DIAGNOSIS — Z86.718 PERSONAL HISTORY OF VENOUS THROMBOSIS AND EMBOLISM: ICD-10-CM

## 2019-10-14 DIAGNOSIS — D68.51 HETEROZYGOUS FACTOR V LEIDEN MUTATION (H): ICD-10-CM

## 2019-10-14 DIAGNOSIS — Z79.01 LONG TERM CURRENT USE OF ANTICOAGULANT THERAPY: ICD-10-CM

## 2019-10-14 LAB — INR POINT OF CARE: 1.8 (ref 0.86–1.14)

## 2019-10-14 PROCEDURE — 99207 ZZC NO CHARGE NURSE ONLY: CPT

## 2019-10-14 PROCEDURE — 36416 COLLJ CAPILLARY BLOOD SPEC: CPT

## 2019-10-14 PROCEDURE — 85610 PROTHROMBIN TIME: CPT | Mod: QW
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
## 2019-10-15 ENCOUNTER — OFFICE VISIT (OUTPATIENT)
Dept: INTERNAL MEDICINE | Facility: CLINIC | Age: 64
End: 2019-10-15
Payer: COMMERCIAL

## 2019-10-15 ENCOUNTER — TELEPHONE (OUTPATIENT)
Dept: ANTICOAGULATION | Facility: CLINIC | Age: 64
End: 2019-10-15

## 2019-10-15 VITALS
DIASTOLIC BLOOD PRESSURE: 84 MMHG | HEART RATE: 66 BPM | RESPIRATION RATE: 18 BRPM | BODY MASS INDEX: 30.8 KG/M2 | TEMPERATURE: 98.3 F | WEIGHT: 232.4 LBS | SYSTOLIC BLOOD PRESSURE: 134 MMHG | HEIGHT: 73 IN | OXYGEN SATURATION: 97 %

## 2019-10-15 DIAGNOSIS — D68.51 HETEROZYGOUS FACTOR V LEIDEN MUTATION (H): ICD-10-CM

## 2019-10-15 DIAGNOSIS — S61.031D PUNCTURE WOUND OF RIGHT THUMB, SUBSEQUENT ENCOUNTER: Primary | ICD-10-CM

## 2019-10-15 DIAGNOSIS — Z86.718 PERSONAL HISTORY OF VENOUS THROMBOSIS AND EMBOLISM: Primary | ICD-10-CM

## 2019-10-15 PROCEDURE — 90471 IMMUNIZATION ADMIN: CPT | Performed by: INTERNAL MEDICINE

## 2019-10-15 PROCEDURE — 90662 IIV NO PRSV INCREASED AG IM: CPT | Performed by: INTERNAL MEDICINE

## 2019-10-15 PROCEDURE — 99213 OFFICE O/P EST LOW 20 MIN: CPT | Mod: 25 | Performed by: INTERNAL MEDICINE

## 2019-10-15 ASSESSMENT — MIFFLIN-ST. JEOR: SCORE: 1893.04

## 2019-10-15 NOTE — TELEPHONE ENCOUNTER
For insurance purposes, an annual INR referral is required. Please complete and sign the order then route back to [facility]. Luna Oakley RN
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
## 2019-10-15 NOTE — NURSING NOTE
"BP (!) 146/88 (BP Location: Right arm, Patient Position: Sitting, Cuff Size: Adult Large)   Pulse 66   Temp 98.3  F (36.8  C) (Oral)   Resp 18   Ht 1.854 m (6' 1\")   Wt 105.4 kg (232 lb 6.4 oz)   SpO2 97%   BMI 30.66 kg/m      "

## 2019-11-04 ENCOUNTER — ANTICOAGULATION THERAPY VISIT (OUTPATIENT)
Dept: ANTICOAGULATION | Facility: CLINIC | Age: 64
End: 2019-11-04
Payer: COMMERCIAL

## 2019-11-04 DIAGNOSIS — Z79.01 LONG TERM CURRENT USE OF ANTICOAGULANT THERAPY: ICD-10-CM

## 2019-11-04 DIAGNOSIS — Z86.718 PERSONAL HISTORY OF VENOUS THROMBOSIS AND EMBOLISM: ICD-10-CM

## 2019-11-04 DIAGNOSIS — D68.51 HETEROZYGOUS FACTOR V LEIDEN MUTATION (H): ICD-10-CM

## 2019-11-04 DIAGNOSIS — D68.52 PROTHROMBIN GENE MUTATION (H): ICD-10-CM

## 2019-11-04 LAB — INR POINT OF CARE: 3.4 (ref 0.86–1.14)

## 2019-11-04 PROCEDURE — 36416 COLLJ CAPILLARY BLOOD SPEC: CPT

## 2019-11-04 PROCEDURE — 99207 ZZC NO CHARGE NURSE ONLY: CPT

## 2019-11-04 PROCEDURE — 85610 PROTHROMBIN TIME: CPT | Mod: QW

## 2019-11-04 NOTE — PROGRESS NOTES
ANTICOAGULATION FOLLOW-UP CLINIC VISIT    Patient Name:  Alvin Ontiveros  Date:  11/4/2019  Contact Type:  Face to Face    SUBJECTIVE:  Patient Findings     Positives:   Change in medications (Patient reports has been taking Advil for thumb injury.)    Comments:   The patient was assessed for diet and activity level changes, missed or extra doses, bruising or bleeding, with no problem findings.          Clinical Outcomes     Comments:   The patient was assessed for diet and activity level changes, missed or extra doses, bruising or bleeding, with no problem findings.             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]  Personal history of venous thrombosis and embolism [Z86.718]             Anticoagulation Episode Summary     INR check location:       Preferred lab:       Send INR reminders to:   [facility]    Comments:   Takes in AM      Anticoagulation Care Providers     Provider Role Specialty Phone number    Du Almaraz MD [facility] Internal Medicine [phone]            See the Encounter Report to view Anticoagulation Flowsheet and Dosing Calendar (Go to Encounters tab in chart review, and find the Anticoagulation Therapy Visit)    Dosage adjustment made based on physician directed care plan.    Luna Oakley RN

## 2019-11-25 ENCOUNTER — ANTICOAGULATION THERAPY VISIT (OUTPATIENT)
Dept: ANTICOAGULATION | Facility: CLINIC | Age: 64
End: 2019-11-25
Payer: COMMERCIAL

## 2019-11-25 DIAGNOSIS — Z79.01 LONG TERM CURRENT USE OF ANTICOAGULANT THERAPY: ICD-10-CM

## 2019-11-25 DIAGNOSIS — D68.51 HETEROZYGOUS FACTOR V LEIDEN MUTATION (H): ICD-10-CM

## 2019-11-25 DIAGNOSIS — D68.52 PROTHROMBIN GENE MUTATION (H): ICD-10-CM

## 2019-11-25 DIAGNOSIS — Z86.718 PERSONAL HISTORY OF VENOUS THROMBOSIS AND EMBOLISM: ICD-10-CM

## 2019-11-25 LAB — INR POINT OF CARE: 2.3 (ref 0.86–1.14)

## 2019-11-25 PROCEDURE — 85610 PROTHROMBIN TIME: CPT | Mod: QW

## 2019-11-25 PROCEDURE — 99207 ZZC NO CHARGE NURSE ONLY: CPT

## 2019-11-25 PROCEDURE — 36416 COLLJ CAPILLARY BLOOD SPEC: CPT

## 2019-11-25 NOTE — PROGRESS NOTES
ANTICOAGULATION FOLLOW-UP CLINIC VISIT    Patient Name:  Alvin Ontiveros  Date:  2019  Contact Type:  Face to Face    SUBJECTIVE:  Patient Findings     Comments:   The patient was assessed for diet, medication, and activity level changes, missed or extra doses, bruising or bleeding, with no problem findings.          Clinical Outcomes     Comments:   The patient was assessed for diet, medication, and activity level changes, missed or extra doses, bruising or bleeding, with no problem findings.             OBJECTIVE    INR Protime   Date Value Ref Range Status   2019 2.3 (A) 0.86 - 1.14 Final       ASSESSMENT / PLAN  INR assessment THER    Recheck INR In: 6 WEEKS    INR Location Clinic      Anticoagulation Summary  As of 2019    INR goal:   2.0-3.0   TTR:   61.1 % (1 y)   INR used for dosin.3 (2019)   Warfarin maintenance plan:   2.5 mg (5 mg x 0.5) every Sun; 5 mg (5 mg x 1) all other days   Full warfarin instructions:   2.5 mg every Sun; 5 mg all other days   Weekly warfarin total:   32.5 mg   No change documented:   Luna Oakley RN   Plan last modified:   Luna Oakley RN (2019)   Next INR check:   2020   Priority:   INR   Target end date:       Indications    Long-term (current) use of anticoagulants [Z79.01] [Z79.01]  Heterozygous factor V Leiden mutation (H) [D68.51]  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]  Personal history of venous thrombosis and embolism [Z86.718]             Anticoagulation Episode Summary     INR check location:       Preferred lab:       Send INR reminders to:   [facility]    Comments:   Takes in AM      Anticoagulation Care Providers     Provider Role Specialty Phone number    Du Almaraz MD [facility] Internal Medicine [phone]            See the Encounter Report to view Anticoagulation Flowsheet and Dosing Calendar (Go to Encounters tab in chart review, and find the Anticoagulation Therapy  Visit)    Dosage adjustment made based on physician directed care plan.    Luna Oakley RN

## 2020-01-07 ENCOUNTER — ANTICOAGULATION THERAPY VISIT (OUTPATIENT)
Dept: ANTICOAGULATION | Facility: CLINIC | Age: 65
End: 2020-01-07
Payer: COMMERCIAL

## 2020-01-07 DIAGNOSIS — R73.03 PREDIABETES: ICD-10-CM

## 2020-01-07 DIAGNOSIS — Z86.718 PERSONAL HISTORY OF VENOUS THROMBOSIS AND EMBOLISM: ICD-10-CM

## 2020-01-07 DIAGNOSIS — D68.52 PROTHROMBIN GENE MUTATION (H): ICD-10-CM

## 2020-01-07 DIAGNOSIS — D68.51 HETEROZYGOUS FACTOR V LEIDEN MUTATION (H): ICD-10-CM

## 2020-01-07 DIAGNOSIS — E78.5 HYPERLIPIDEMIA LDL GOAL <130: ICD-10-CM

## 2020-01-07 DIAGNOSIS — Z79.01 LONG TERM CURRENT USE OF ANTICOAGULANT THERAPY: ICD-10-CM

## 2020-01-07 LAB
ALBUMIN SERPL-MCNC: 3.8 G/DL (ref 3.4–5)
ALP SERPL-CCNC: 56 U/L (ref 40–150)
ALT SERPL W P-5'-P-CCNC: 34 U/L (ref 0–70)
ANION GAP SERPL CALCULATED.3IONS-SCNC: 4 MMOL/L (ref 3–14)
AST SERPL W P-5'-P-CCNC: 18 U/L (ref 0–45)
BILIRUB SERPL-MCNC: 0.8 MG/DL (ref 0.2–1.3)
BUN SERPL-MCNC: 17 MG/DL (ref 7–30)
CALCIUM SERPL-MCNC: 10.3 MG/DL (ref 8.5–10.1)
CHLORIDE SERPL-SCNC: 104 MMOL/L (ref 94–109)
CHOLEST SERPL-MCNC: 211 MG/DL
CO2 SERPL-SCNC: 31 MMOL/L (ref 20–32)
CREAT SERPL-MCNC: 0.89 MG/DL (ref 0.66–1.25)
GFR SERPL CREATININE-BSD FRML MDRD: 90 ML/MIN/{1.73_M2}
GLUCOSE SERPL-MCNC: 117 MG/DL (ref 70–99)
HBA1C MFR BLD: 5.8 % (ref 0–5.6)
HDLC SERPL-MCNC: 78 MG/DL
INR POINT OF CARE: 2.5 (ref 0.86–1.14)
LDLC SERPL CALC-MCNC: 110 MG/DL
NONHDLC SERPL-MCNC: 133 MG/DL
POTASSIUM SERPL-SCNC: 3.9 MMOL/L (ref 3.4–5.3)
PROT SERPL-MCNC: 7.1 G/DL (ref 6.8–8.8)
SODIUM SERPL-SCNC: 139 MMOL/L (ref 133–144)
TRIGL SERPL-MCNC: 117 MG/DL

## 2020-01-07 PROCEDURE — 85610 PROTHROMBIN TIME: CPT | Mod: QW

## 2020-01-07 PROCEDURE — 80053 COMPREHEN METABOLIC PANEL: CPT | Performed by: INTERNAL MEDICINE

## 2020-01-07 PROCEDURE — 80061 LIPID PANEL: CPT | Performed by: INTERNAL MEDICINE

## 2020-01-07 PROCEDURE — 99207 ZZC NO CHARGE NURSE ONLY: CPT

## 2020-01-07 PROCEDURE — 36416 COLLJ CAPILLARY BLOOD SPEC: CPT

## 2020-01-07 PROCEDURE — 83036 HEMOGLOBIN GLYCOSYLATED A1C: CPT | Performed by: INTERNAL MEDICINE

## 2020-01-07 NOTE — PROGRESS NOTES
ANTICOAGULATION FOLLOW-UP CLINIC VISIT    Patient Name:  Alvin Ontiveros  Date:  2020  Contact Type:  Face to Face    SUBJECTIVE:  Patient Findings     Comments:   The patient was assessed for diet, medication, and activity level changes, missed or extra doses, bruising or bleeding, with no problem findings.  Patient will be going on vacation the week of , so next INR done in 5 weeks instead of 6 weeks.          Clinical Outcomes     Negatives:   Major bleeding event, Thromboembolic event, Anticoagulation-related hospital admission, Anticoagulation-related ED visit, Anticoagulation-related fatality    Comments:   The patient was assessed for diet, medication, and activity level changes, missed or extra doses, bruising or bleeding, with no problem findings.  Patient will be going on vacation the week of , so next INR done in 5 weeks instead of 6 weeks.             OBJECTIVE    INR Protime   Date Value Ref Range Status   2020 2.5 (A) 0.86 - 1.14 Final       ASSESSMENT / PLAN  INR assessment THER    Recheck INR In: 5 WEEKS    INR Location Clinic      Anticoagulation Summary  As of 2020    INR goal:   2.0-3.0   TTR:   72.8 % (1 y)   INR used for dosin.5 (2020)   Warfarin maintenance plan:   2.5 mg (5 mg x 0.5) every Sun; 5 mg (5 mg x 1) all other days   Full warfarin instructions:   2.5 mg every Sun; 5 mg all other days   Weekly warfarin total:   32.5 mg   No change documented:   Kari Lizarraga RN   Plan last modified:   Luna Oakley RN (2019)   Next INR check:   2/10/2020   Priority:   INR   Target end date:       Indications    Long-term (current) use of anticoagulants [Z79.01] [Z79.01]  Heterozygous factor V Leiden mutation (H) [D68.51]  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]  Personal history of venous thrombosis and embolism [Z86.718]             Anticoagulation Episode Summary     INR check location:       Preferred lab:       Send INR reminders to:   [facility]    Comments:   Takes in AM      Anticoagulation Care Providers     Provider Role Specialty Phone number    Du Almaraz MD [facility] Internal Medicine [phone]            See the Encounter Report to view Anticoagulation Flowsheet and Dosing Calendar (Go to Encounters tab in chart review, and find the Anticoagulation Therapy Visit)    Dosage adjustment made based on physician directed care plan.    Kari Lizarraga RN

## 2020-01-14 ENCOUNTER — OFFICE VISIT (OUTPATIENT)
Dept: INTERNAL MEDICINE | Facility: CLINIC | Age: 65
End: 2020-01-14
Payer: COMMERCIAL

## 2020-01-14 VITALS
SYSTOLIC BLOOD PRESSURE: 136 MMHG | RESPIRATION RATE: 18 BRPM | TEMPERATURE: 98.2 F | WEIGHT: 234 LBS | HEART RATE: 72 BPM | BODY MASS INDEX: 31.01 KG/M2 | HEIGHT: 73 IN | OXYGEN SATURATION: 97 % | DIASTOLIC BLOOD PRESSURE: 87 MMHG

## 2020-01-14 DIAGNOSIS — I10 ESSENTIAL HYPERTENSION: ICD-10-CM

## 2020-01-14 DIAGNOSIS — E78.5 HYPERLIPIDEMIA LDL GOAL <130: ICD-10-CM

## 2020-01-14 DIAGNOSIS — K21.9 GASTROESOPHAGEAL REFLUX DISEASE WITHOUT ESOPHAGITIS: ICD-10-CM

## 2020-01-14 DIAGNOSIS — Z86.718 PERSONAL HISTORY OF VENOUS THROMBOSIS AND EMBOLISM: Primary | ICD-10-CM

## 2020-01-14 DIAGNOSIS — Z79.01 LONG TERM CURRENT USE OF ANTICOAGULANT THERAPY: ICD-10-CM

## 2020-01-14 DIAGNOSIS — R73.03 PREDIABETES: ICD-10-CM

## 2020-01-14 DIAGNOSIS — D68.51 HETEROZYGOUS FACTOR V LEIDEN MUTATION (H): ICD-10-CM

## 2020-01-14 PROCEDURE — 99214 OFFICE O/P EST MOD 30 MIN: CPT | Performed by: INTERNAL MEDICINE

## 2020-01-14 RX ORDER — WARFARIN SODIUM 5 MG/1
TABLET ORAL
Qty: 90 TABLET | Refills: 1 | Status: SHIPPED | OUTPATIENT
Start: 2020-01-14 | End: 2021-01-04

## 2020-01-14 RX ORDER — FOLIC ACID 1 MG/1
1000 TABLET ORAL DAILY
Qty: 100 TABLET | Refills: 3 | Status: SHIPPED | OUTPATIENT
Start: 2020-01-14 | End: 2020-08-21

## 2020-01-14 RX ORDER — HYDROCHLOROTHIAZIDE 25 MG/1
25 TABLET ORAL DAILY
Qty: 90 TABLET | Refills: 3 | Status: SHIPPED | OUTPATIENT
Start: 2020-01-14 | End: 2020-08-11

## 2020-01-14 RX ORDER — LOSARTAN POTASSIUM 100 MG/1
100 TABLET ORAL DAILY
Qty: 90 TABLET | Refills: 3 | Status: SHIPPED | OUTPATIENT
Start: 2020-01-14 | End: 2020-08-11

## 2020-01-14 RX ORDER — PRAVASTATIN SODIUM 40 MG
TABLET ORAL
Qty: 90 TABLET | Refills: 3 | Status: SHIPPED | OUTPATIENT
Start: 2020-01-14 | End: 2020-08-28

## 2020-01-14 ASSESSMENT — MIFFLIN-ST. JEOR: SCORE: 1900.3

## 2020-01-14 NOTE — NURSING NOTE
"/87 (BP Location: Left arm, Patient Position: Sitting, Cuff Size: Adult Large)   Pulse 72   Temp 98.2  F (36.8  C) (Oral)   Resp 18   Ht 1.854 m (6' 1\")   Wt 106.1 kg (234 lb)   SpO2 97%   BMI 30.87 kg/m     Patient is being seen for a follow up ( HTN, LDL ).  "

## 2020-01-14 NOTE — PROGRESS NOTES
Subjective     Alvin Ontiveros is a 64 year old male who presents to clinic today for the following health issues:  Patient is being seen for a follow up ( HTN, LDL, prediabetes, chronic oral anticoagulation due to prior pulmonary embolism).    HPI   Hyperlipidemia Follow-Up      Are you regularly taking any medication or supplement to lower your cholesterol?   Yes- Pravastatin    Are you having muscle aches or other side effects that you think could be caused by your cholesterol lowering medication?  No    Hypertension Follow-up      Do you check your blood pressure regularly outside of the clinic? No     Are you following a low salt diet? Yes    Are your blood pressures ever more than 140 on the top number (systolic) OR more   than 90 on the bottom number (diastolic), for example 140/90? No      How many servings of fruits and vegetables do you eat daily?  2-3    On average, how many sweetened beverages do you drink each day (Examples: soda, juice, sweet tea, etc.  Do NOT count diet or artificially sweetened beverages)?   0    How many days per week do you miss taking your medication? 0    BP Readings from Last 3 Encounters:   01/14/20 136/87   10/15/19 134/84   10/10/19 134/84     Past records reviewed and discussed for   - Hx of esophageal reflux, well managed with omeprazole.   - Patient on chronic oral anticoagulation for hx of venous thrombosis and embolism as well as heterozygous factor V Leiden mutation.   - Patient continues to have right great toe numbness.     Labs Reviewed in [facility]   Recent Labs   Lab Test 01/07/20  0750 06/25/19  0742 05/30/18  0830 05/30/18  0800  01/08/15  0834 02/28/14  0840   A1C 5.8* 5.6  --  5.5   < > 5.9  --    * 106* 99  --    < > 117 103   HDL 78 62 68  --    < > 71 61   TRIG 117 105 107  --    < > 147 118   ALT 34 28 27  --    < > 35 35   CR 0.89 0.89 0.88  --    < > 0.88 0.90   GFRESTIMATED 90 90 87  --    < > 88 86   GFRESTBLACK >90 >90 >90  --    < > >90    "American GFR Calc   >90   POTASSIUM 3.9 4.5 4.0  --    < > 4.0 5.1   TSH  --   --   --   --   --  4.54* 5.39*    < > = values in this interval not displayed.      Reviewed and updated as needed this visit by Provider       Review of Systems   REVIEW OF SYSTEMS: The following systems have been completely reviewed and are negative except as noted in the HPI:   Constitutional, respiratory, cardiovascular, gastrointestinal, hematologic, endocrine, and neurologic systems.    This document serves as a record of the services and decisions personally performed and made by Du Almaraz MD. It was created on his behalf by Sanam Terrell, a trained medical scribe. The creation of this document is based on the provider's statements to the medical scribe.  Sanam Terrell 7:50 AM January 14, 2020      Objective    /87 (BP Location: Left arm, Patient Position: Sitting, Cuff Size: Adult Large)   Pulse 72   Temp 98.2  F (36.8  C) (Oral)   Resp 18   Ht 1.854 m (6' 1\")   Wt 106.1 kg (234 lb)   SpO2 97%   BMI 30.87 kg/m    Body mass index is 30.87 kg/m .  Physical Exam   GENERAL: healthy, alert and no distress  RESP: lungs clear to auscultation - no rales, rhonchi or wheezes  CV: regular rate and rhythm, normal S1 S2, no S3 or S4, no murmur, click or rub, no peripheral edema and peripheral pulses strong    Diagnostic Test Results:  Labs reviewed in Epic      Assessment & Plan     (Z86.718) Personal history of venous thrombosis and embolism  (primary encounter diagnosis)  Comment: Stable.   Plan: Continue chronic oral anticoagulation.     (I10) Essential hypertension  Comment: Stable. Continue current medications. Recheck in one year.   Plan: hydrochlorothiazide (HYDRODIURIL) 25 MG tablet,        losartan (COZAAR) 100 MG tablet          (E78.5) Hyperlipidemia LDL goal <130  Comment: Stable. Continue the statin. Recheck in one year.   Plan: pravastatin (PRAVACHOL) 40 MG tablet          (D68.51) Heterozygous Factor V Leiden " "Mutation  Comment: Stable. Continue chronic oral anticoagulation.   Plan: warfarin ANTICOAGULANT (COUMADIN) 5 MG tablet          (R73.03) Prediabetes  Comment: Stable. Encouraged patient to have a healthy diet and regular exercise. Recommended a ten to twenty pound weight loss.  Plan: folic acid (FOLVITE) 1 MG tablet          (Z79.01) Long-term (current) use of anticoagulants [Z79.01]  Comment: Stable.   Plan: Continue chronic oral anticoagulation.     (K21.9) Gastroesophageal reflux disease without esophagitis  Comment: Controlled. Continue omeprazole.   Plan: omeprazole (PRILOSEC) 20 MG DR capsule           BMI:   Estimated body mass index is 30.87 kg/m  as calculated from the following:    Height as of this encounter: 1.854 m (6' 1\").    Weight as of this encounter: 106.1 kg (234 lb).   Weight management plan: Discussed healthy diet and exercise guidelines    Patient Instructions   .Everything looks fine!    Refills of medications have been faxed to your pharmacy.     Recent lab results look fine.     See you in a year, sooner if problems.        FUTURE APPOINTMENTS:       - Follow-up visit in: one year or sooner if needed     The information in this document, created by the medical scribe for me, accurately reflects the services I personally performed and the decisions made by me. I have reviewed and approved this document for accuracy prior to leaving the patient care area.  January 14, 2020 8:11 AM    Du Almaraz MD,   [facility]        "

## 2020-01-14 NOTE — PATIENT INSTRUCTIONS
.Everything looks fine!    Refills of medications have been faxed to your pharmacy.     Recent lab results look fine.     See you in a year, sooner if problems.

## 2020-02-10 ENCOUNTER — ANTICOAGULATION THERAPY VISIT (OUTPATIENT)
Dept: ANTICOAGULATION | Facility: CLINIC | Age: 65
End: 2020-02-10
Payer: COMMERCIAL

## 2020-02-10 DIAGNOSIS — D68.52 PROTHROMBIN GENE MUTATION (H): ICD-10-CM

## 2020-02-10 DIAGNOSIS — D68.51 HETEROZYGOUS FACTOR V LEIDEN MUTATION (H): ICD-10-CM

## 2020-02-10 DIAGNOSIS — Z86.718 PERSONAL HISTORY OF VENOUS THROMBOSIS AND EMBOLISM: ICD-10-CM

## 2020-02-10 DIAGNOSIS — Z79.01 LONG TERM CURRENT USE OF ANTICOAGULANT THERAPY: ICD-10-CM

## 2020-02-10 LAB — INR POINT OF CARE: 2.2 (ref 0.86–1.14)

## 2020-02-10 PROCEDURE — 36416 COLLJ CAPILLARY BLOOD SPEC: CPT

## 2020-02-10 PROCEDURE — 85610 PROTHROMBIN TIME: CPT | Mod: QW

## 2020-02-10 PROCEDURE — 99207 ZZC NO CHARGE NURSE ONLY: CPT

## 2020-02-10 NOTE — PROGRESS NOTES
ANTICOAGULATION FOLLOW-UP CLINIC VISIT    Patient Name:  Alvin Ontiveros  Date:  2/10/2020  Contact Type:  Face to Face    SUBJECTIVE:  Patient Findings     Comments:   The patient was assessed for diet, medication, and activity level changes, missed or extra doses, bruising or bleeding, with no problem findings.          Clinical Outcomes     Negatives:   Major bleeding event, Thromboembolic event, Anticoagulation-related hospital admission, Anticoagulation-related ED visit, Anticoagulation-related fatality    Comments:   The patient was assessed for diet, medication, and activity level changes, missed or extra doses, bruising or bleeding, with no problem findings.             OBJECTIVE    INR Protime   Date Value Ref Range Status   02/10/2020 2.2 (A) 0.86 - 1.14 Final       ASSESSMENT / PLAN  No question data found.  Anticoagulation Summary  As of 2/10/2020    INR goal:   2.0-3.0   TTR:   79.2 % (1 y)   INR used for dosin.2 (2/10/2020)   Warfarin maintenance plan:   2.5 mg (5 mg x 0.5) every Sun; 5 mg (5 mg x 1) all other days   Full warfarin instructions:   2.5 mg every Sun; 5 mg all other days   Weekly warfarin total:   32.5 mg   No change documented:   Nicole Burnham RN   Plan last modified:   Luna Oakley RN (2019)   Next INR check:   3/23/2020   Priority:   Maintenance   Target end date:       Indications    Long-term (current) use of anticoagulants [Z79.01] [Z79.01]  Heterozygous factor V Leiden mutation (H) [D68.51]  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

## 2020-03-18 ENCOUNTER — TELEPHONE (OUTPATIENT)
Dept: INTERNAL MEDICINE | Facility: CLINIC | Age: 65
End: 2020-03-18

## 2020-03-18 NOTE — TELEPHONE ENCOUNTER
Called patient. He was assessed for diet, medication, and activity level changes, missed or extra doses, bruising or bleeding, with no problem findings.  Patient's INR appointment was rescheduled from 3/23/20 to 4/6/20, will be 8 weeks between INR checks. Patient was advised to call if he develops any bleeding, bruising, medication changes or if he has any questions.   Patient verbalized understanding.   Luna Oakley RN

## 2020-03-18 NOTE — TELEPHONE ENCOUNTER
Pt calling in returning Luna's call in regards to reschedule his appt on 3/23.  Please call pt back.

## 2020-03-24 ENCOUNTER — TELEPHONE (OUTPATIENT)
Dept: INTERNAL MEDICINE | Facility: CLINIC | Age: 65
End: 2020-03-24

## 2020-04-01 DIAGNOSIS — D68.51 HETEROZYGOUS FACTOR V LEIDEN MUTATION (H): ICD-10-CM

## 2020-04-01 DIAGNOSIS — Z79.01 LONG TERM CURRENT USE OF ANTICOAGULANT THERAPY: Primary | ICD-10-CM

## 2020-04-06 ENCOUNTER — ANTICOAGULATION THERAPY VISIT (OUTPATIENT)
Dept: ANTICOAGULATION | Facility: CLINIC | Age: 65
End: 2020-04-06

## 2020-04-06 DIAGNOSIS — D68.52 PROTHROMBIN GENE MUTATION (H): ICD-10-CM

## 2020-04-06 DIAGNOSIS — Z86.718 PERSONAL HISTORY OF VENOUS THROMBOSIS AND EMBOLISM: ICD-10-CM

## 2020-04-06 DIAGNOSIS — Z79.01 LONG TERM CURRENT USE OF ANTICOAGULANT THERAPY: ICD-10-CM

## 2020-04-06 DIAGNOSIS — D68.51 HETEROZYGOUS FACTOR V LEIDEN MUTATION (H): ICD-10-CM

## 2020-04-06 LAB
CAPILLARY BLOOD COLLECTION: NORMAL
INR PPP: 2.4 (ref 0.86–1.14)

## 2020-04-06 PROCEDURE — 85610 PROTHROMBIN TIME: CPT | Performed by: INTERNAL MEDICINE

## 2020-04-06 PROCEDURE — 36416 COLLJ CAPILLARY BLOOD SPEC: CPT | Performed by: INTERNAL MEDICINE

## 2020-04-06 PROCEDURE — 99207 ZZC NO CHARGE NURSE ONLY: CPT | Performed by: INTERNAL MEDICINE

## 2020-04-06 NOTE — PROGRESS NOTES
ANTICOAGULATION FOLLOW-UP CLINIC VISIT    Patient Name:  Alvin Ontiveros  Date:  2020  Contact Type:  Telephone/ Called patient, denies any changes. Orders discussed with patient.     SUBJECTIVE:  Patient Findings     Comments:   The patient was assessed for diet, medication, and activity level changes, missed or extra doses, bruising or bleeding, with no problem findings.          Clinical Outcomes     Negatives:   Major bleeding event, Thromboembolic event, Anticoagulation-related hospital admission, Anticoagulation-related ED visit, Anticoagulation-related fatality    Comments:   The patient was assessed for diet, medication, and activity level changes, missed or extra doses, bruising or bleeding, with no problem findings.             OBJECTIVE    INR   Date Value Ref Range Status   2020 2.40 (H) 0.86 - 1.14 Final     Comment:     This test is intended for monitoring Coumadin therapy.  Results are not   accurate in patients with prolonged INR due to factor deficiency.         ASSESSMENT / PLAN  INR assessment THER    Recheck INR In: 7 WEEKS    INR Location Outside lab      Anticoagulation Summary  As of 2020    INR goal:   2.0-3.0   TTR:   79.3 % (1 y)   INR used for dosin.40 (2020)   Warfarin maintenance plan:   2.5 mg (5 mg x 0.5) every Sun; 5 mg (5 mg x 1) all other days   Full warfarin instructions:   2.5 mg every Sun; 5 mg all other days   Weekly warfarin total:   32.5 mg   No change documented:   Luna Oakley RN   Plan last modified:   Luna Oakley RN (2019)   Next INR check:   2020   Priority:   Maintenance   Target end date:       Indications    Long-term (current) use of anticoagulants [Z79.01] [Z79.01]  Heterozygous factor V Leiden mutation (H) [D68.51]  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]  Personal history of venous thrombosis and embolism [Z86.718]             Anticoagulation Episode Summary     INR check location:       Preferred lab:       Send  INR reminders to:   [facility]    Comments:   Takes in AM      Anticoagulation Care Providers     Provider Role Specialty Phone number    Du Almaraz MD [facility] Internal Medicine [phone]            See the Encounter Report to view Anticoagulation Flowsheet and Dosing Calendar (Go to Encounters tab in chart review, and find the Anticoagulation Therapy Visit)    Dosage adjustment made based on physician directed care plan.    Luna Oakley RN

## 2020-05-26 ENCOUNTER — ANTICOAGULATION THERAPY VISIT (OUTPATIENT)
Dept: ANTICOAGULATION | Facility: CLINIC | Age: 65
End: 2020-05-26

## 2020-05-26 DIAGNOSIS — D68.52 PROTHROMBIN GENE MUTATION (H): ICD-10-CM

## 2020-05-26 DIAGNOSIS — Z79.01 LONG TERM CURRENT USE OF ANTICOAGULANT THERAPY: ICD-10-CM

## 2020-05-26 DIAGNOSIS — D68.51 HETEROZYGOUS FACTOR V LEIDEN MUTATION (H): ICD-10-CM

## 2020-05-26 DIAGNOSIS — Z86.718 PERSONAL HISTORY OF VENOUS THROMBOSIS AND EMBOLISM: ICD-10-CM

## 2020-05-26 LAB — INR PPP: 2.1 (ref 0.86–1.14)

## 2020-05-26 PROCEDURE — 85610 PROTHROMBIN TIME: CPT | Performed by: INTERNAL MEDICINE

## 2020-05-26 PROCEDURE — 36416 COLLJ CAPILLARY BLOOD SPEC: CPT | Performed by: INTERNAL MEDICINE

## 2020-05-26 PROCEDURE — 99207 ZZC NO CHARGE NURSE ONLY: CPT | Performed by: INTERNAL MEDICINE

## 2020-05-26 NOTE — PROGRESS NOTES
ANTICOAGULATION FOLLOW-UP CLINIC VISIT    Patient Name:  Alvin Ontiveros  Date:  2020  Contact Type:  Telephone/ Called patient, he denies any changes or missed warfarin doses. Orders discussed with patient.    SUBJECTIVE:  Patient Findings     Comments:   The patient was assessed for diet, medication, and activity level changes, missed or extra doses, bruising or bleeding, with no problem findings.          Clinical Outcomes     Negatives:   Major bleeding event, Thromboembolic event, Anticoagulation-related hospital admission, Anticoagulation-related ED visit, Anticoagulation-related fatality    Comments:   The patient was assessed for diet, medication, and activity level changes, missed or extra doses, bruising or bleeding, with no problem findings.             OBJECTIVE    Recent labs: (last 7 days)     20  1541   INR 2.10*       ASSESSMENT / PLAN  INR assessment THER    Recheck INR In: 6 WEEKS    INR Location Outside lab      Anticoagulation Summary  As of 2020    INR goal:   2.0-3.0   TTR:   88.5 % (1 y)   INR used for dosin.10 (2020)   Warfarin maintenance plan:   2.5 mg (5 mg x 0.5) every Sun; 5 mg (5 mg x 1) all other days   Full warfarin instructions:   2.5 mg every Sun; 5 mg all other days   Weekly warfarin total:   32.5 mg   No change documented:   Luna Oakley RN   Plan last modified:   Luna Oakley RN (2019)   Next INR check:   2020   Priority:   Maintenance   Target end date:       Indications    Long-term (current) use of anticoagulants [Z79.01] [Z79.01]  Heterozygous factor V Leiden mutation (H) [D68.51]  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]  Personal history of venous thrombosis and embolism [Z86.718]             Anticoagulation Episode Summary     INR check location:       Preferred lab:       Send INR reminders to:   [facility]    Comments:   Takes in AM      Anticoagulation Care Providers     Provider Role Specialty Phone  number    Du Almaraz MD [facility] Internal Medicine [phone]            See the Encounter Report to view Anticoagulation Flowsheet and Dosing Calendar (Go to Encounters tab in chart review, and find the Anticoagulation Therapy Visit)    Dosage adjustment made based on physician directed care plan.    Luna Oakley RN

## 2020-07-07 ENCOUNTER — ANTICOAGULATION THERAPY VISIT (OUTPATIENT)
Dept: ANTICOAGULATION | Facility: CLINIC | Age: 65
End: 2020-07-07

## 2020-07-07 DIAGNOSIS — D68.52 PROTHROMBIN GENE MUTATION (H): ICD-10-CM

## 2020-07-07 DIAGNOSIS — Z79.01 LONG TERM CURRENT USE OF ANTICOAGULANT THERAPY: ICD-10-CM

## 2020-07-07 DIAGNOSIS — D68.51 HETEROZYGOUS FACTOR V LEIDEN MUTATION (H): ICD-10-CM

## 2020-07-07 DIAGNOSIS — Z86.718 PERSONAL HISTORY OF VENOUS THROMBOSIS AND EMBOLISM: ICD-10-CM

## 2020-07-07 LAB
CAPILLARY BLOOD COLLECTION: NORMAL
INR PPP: 2.3 (ref 0.86–1.14)

## 2020-07-07 PROCEDURE — 85610 PROTHROMBIN TIME: CPT | Performed by: INTERNAL MEDICINE

## 2020-07-07 PROCEDURE — 99207 ZZC NO CHARGE NURSE ONLY: CPT | Performed by: INTERNAL MEDICINE

## 2020-07-07 PROCEDURE — 36416 COLLJ CAPILLARY BLOOD SPEC: CPT | Performed by: INTERNAL MEDICINE

## 2020-07-07 NOTE — PROGRESS NOTES
ANTICOAGULATION FOLLOW-UP CLINIC VISIT    Patient Name:  Alvin Ontiveros  Date:  2020  Contact Type:  Telephone/ Called patient, he denies any changes or missed warfarin doses. Orders discussed with patient, he agrees with plan, lab INR scheduled on 20.    SUBJECTIVE:  Patient Findings     Comments:   The patient was assessed for diet, medication, and activity level changes, missed or extra doses, bruising or bleeding, with no problem findings. Maintenance warfarin dosing reviewed with patient and will remain on the same dose until next INR check. Patient did not have any questions or concerns.           Clinical Outcomes     Comments:   The patient was assessed for diet, medication, and activity level changes, missed or extra doses, bruising or bleeding, with no problem findings. Maintenance warfarin dosing reviewed with patient and will remain on the same dose until next INR check. Patient did not have any questions or concerns.              OBJECTIVE    Recent labs: (last 7 days)     20  1548   INR 2.30*       ASSESSMENT / PLAN  INR assessment THER    Recheck INR In: 6 WEEKS    INR Location Outside lab      Anticoagulation Summary  As of 2020    INR goal:   2.0-3.0   TTR:   92.2 % (1 y)   INR used for dosin.30 (2020)   Warfarin maintenance plan:   2.5 mg (5 mg x 0.5) every Sun; 5 mg (5 mg x 1) all other days   Full warfarin instructions:   2.5 mg every Sun; 5 mg all other days   Weekly warfarin total:   32.5 mg   No change documented:   Luna Oakley RN   Plan last modified:   Luna Oakley RN (2019)   Next INR check:   2020   Priority:   Maintenance   Target end date:       Indications    Long-term (current) use of anticoagulants [Z79.01] [Z79.01]  Heterozygous factor V Leiden mutation (H) [D68.51]  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]  Personal history of venous thrombosis and embolism [Z86.718]             Anticoagulation Episode Summary     INR check  location:       Preferred lab:       Send INR reminders to:   PA[facility]    Comments:   Takes in AM      Anticoagulation Care Providers     Provider Role Specialty Phone number    Du Almaraz MD [facility] Internal Medicine [phone]            See the Encounter Report to view Anticoagulation Flowsheet and Dosing Calendar (Go to Encounters tab in chart review, and find the Anticoagulation Therapy Visit)    Dosage adjustment made based on physician directed care plan.    Luna Oakley RN

## 2020-08-09 DIAGNOSIS — I10 ESSENTIAL HYPERTENSION: ICD-10-CM

## 2020-08-11 RX ORDER — HYDROCHLOROTHIAZIDE 25 MG/1
TABLET ORAL
Qty: 90 TABLET | Refills: 1 | Status: SHIPPED | OUTPATIENT
Start: 2020-08-11 | End: 2020-11-10

## 2020-08-11 RX ORDER — LOSARTAN POTASSIUM 100 MG/1
TABLET ORAL
Qty: 90 TABLET | Refills: 1 | Status: SHIPPED | OUTPATIENT
Start: 2020-08-11 | End: 2020-11-10

## 2020-08-11 NOTE — TELEPHONE ENCOUNTER
Patient requesting remaining refills from 1/14/20 be sent to another pharmacy    [place] [facility] [place]

## 2020-08-18 ENCOUNTER — ANTICOAGULATION THERAPY VISIT (OUTPATIENT)
Dept: ANTICOAGULATION | Facility: CLINIC | Age: 65
End: 2020-08-18

## 2020-08-18 DIAGNOSIS — D68.51 HETEROZYGOUS FACTOR V LEIDEN MUTATION (H): ICD-10-CM

## 2020-08-18 DIAGNOSIS — D68.52 PROTHROMBIN GENE MUTATION (H): ICD-10-CM

## 2020-08-18 DIAGNOSIS — Z79.01 LONG TERM CURRENT USE OF ANTICOAGULANT THERAPY: ICD-10-CM

## 2020-08-18 DIAGNOSIS — Z86.718 PERSONAL HISTORY OF VENOUS THROMBOSIS AND EMBOLISM: ICD-10-CM

## 2020-08-18 LAB
CAPILLARY BLOOD COLLECTION: NORMAL
INR PPP: 2.9 (ref 0.86–1.14)

## 2020-08-18 PROCEDURE — 99207 ZZC NO CHARGE NURSE ONLY: CPT | Performed by: INTERNAL MEDICINE

## 2020-08-18 PROCEDURE — 85610 PROTHROMBIN TIME: CPT | Performed by: INTERNAL MEDICINE

## 2020-08-18 PROCEDURE — 36416 COLLJ CAPILLARY BLOOD SPEC: CPT | Performed by: INTERNAL MEDICINE

## 2020-08-20 DIAGNOSIS — R73.03 PREDIABETES: ICD-10-CM

## 2020-08-21 RX ORDER — FOLIC ACID 1 MG/1
TABLET ORAL
Qty: 102 TABLET | Refills: 1 | Status: SHIPPED | OUTPATIENT
Start: 2020-08-21 | End: 2020-11-10

## 2020-08-21 NOTE — TELEPHONE ENCOUNTER
Patient requesting remaining refills from 1/14/20 be sent to another pharmacy    Prescription approved per [facility] Refill Protocol.

## 2020-08-27 DIAGNOSIS — K21.9 GASTROESOPHAGEAL REFLUX DISEASE WITHOUT ESOPHAGITIS: ICD-10-CM

## 2020-08-27 DIAGNOSIS — E78.5 HYPERLIPIDEMIA LDL GOAL <130: ICD-10-CM

## 2020-08-28 RX ORDER — PRAVASTATIN SODIUM 40 MG
TABLET ORAL
Qty: 90 TABLET | Refills: 1 | Status: SHIPPED | OUTPATIENT
Start: 2020-08-28 | End: 2020-11-10

## 2020-08-28 NOTE — TELEPHONE ENCOUNTER
Patient requesting remaining refills from 1/14/20 be sent to another pharmacy     [place] [facility] [place]

## 2020-09-29 ENCOUNTER — ANTICOAGULATION THERAPY VISIT (OUTPATIENT)
Dept: ANTICOAGULATION | Facility: CLINIC | Age: 65
End: 2020-09-29

## 2020-09-29 DIAGNOSIS — Z79.01 LONG TERM CURRENT USE OF ANTICOAGULANT THERAPY: ICD-10-CM

## 2020-09-29 DIAGNOSIS — D68.52 PROTHROMBIN GENE MUTATION (H): ICD-10-CM

## 2020-09-29 DIAGNOSIS — Z86.718 PERSONAL HISTORY OF VENOUS THROMBOSIS AND EMBOLISM: ICD-10-CM

## 2020-09-29 DIAGNOSIS — D68.51 HETEROZYGOUS FACTOR V LEIDEN MUTATION (H): ICD-10-CM

## 2020-09-29 LAB
CAPILLARY BLOOD COLLECTION: NORMAL
INR PPP: 3.6 (ref 0.86–1.14)

## 2020-09-29 PROCEDURE — 36416 COLLJ CAPILLARY BLOOD SPEC: CPT | Performed by: INTERNAL MEDICINE

## 2020-09-29 PROCEDURE — 85610 PROTHROMBIN TIME: CPT | Performed by: INTERNAL MEDICINE

## 2020-09-29 PROCEDURE — 99207 ZZC NO CHARGE NURSE ONLY: CPT | Performed by: INTERNAL MEDICINE

## 2020-09-29 NOTE — PROGRESS NOTES
ANTICOAGULATION FOLLOW-UP CLINIC VISIT    Patient Name:  Alvin Ontiveros  Date:  9/29/2020  Contact Type:  Telephone/ Called patient, he denies any changes. Orders discussed with the patient, he agrees with the plan. Lab INR appointment scheduled on 10/14/20.    SUBJECTIVE:  Patient Findings     Positives:   Change in medications (Patient reports taking Advil 9/26/20.)    Comments:   The patient was assessed for diet, medication, and activity level changes, missed or extra doses, bruising or bleeding, with no problem findings. Patient denies any identifiable changes that caused the supratherapeutic INR.             Clinical Outcomes     Comments:   The patient was assessed for diet, medication, and activity level changes, missed or extra doses, bruising or bleeding, with no problem findings. Patient denies any identifiable changes that caused the supratherapeutic INR.                OBJECTIVE    Recent labs: (last 7 days)     09/29/20  1529   INR 3.60*       ASSESSMENT / PLAN  INR assessment SUPRA    Recheck INR In: 2 WEEKS    INR Location Outside lab      Anticoagulation Summary  As of 9/29/2020    INR goal:   2.0-3.0   TTR:   83.6 % (1 y)   INR used for dosing:   3.60! 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 Internal Medicine [phone]            See the Encounter Report to view Anticoagulation Flowsheet and Dosing Calendar (Go to Encounters tab in chart review, and find the Anticoagulation Therapy Visit)    Dosage adjustment made based on physician directed care plan.    Luna Oakley RN

## 2020-10-14 ENCOUNTER — TELEPHONE (OUTPATIENT)
Dept: INTERNAL MEDICINE | Facility: CLINIC | Age: 65
End: 2020-10-14

## 2020-10-14 ENCOUNTER — ANTICOAGULATION THERAPY VISIT (OUTPATIENT)
Dept: ANTICOAGULATION | Facility: CLINIC | Age: 65
End: 2020-10-14

## 2020-10-14 DIAGNOSIS — D68.51 HETEROZYGOUS FACTOR V LEIDEN MUTATION (H): ICD-10-CM

## 2020-10-14 DIAGNOSIS — Z79.01 LONG TERM CURRENT USE OF ANTICOAGULANT THERAPY: ICD-10-CM

## 2020-10-14 DIAGNOSIS — Z86.718 PERSONAL HISTORY OF VENOUS THROMBOSIS AND EMBOLISM: ICD-10-CM

## 2020-10-14 DIAGNOSIS — Z86.718 PERSONAL HISTORY OF VENOUS THROMBOSIS AND EMBOLISM: Primary | ICD-10-CM

## 2020-10-14 DIAGNOSIS — D68.52 PROTHROMBIN GENE MUTATION (H): ICD-10-CM

## 2020-10-14 LAB
CAPILLARY BLOOD COLLECTION: NORMAL
INR PPP: 3.5 (ref 0.86–1.14)

## 2020-10-14 PROCEDURE — 36416 COLLJ CAPILLARY BLOOD SPEC: CPT | Performed by: INTERNAL MEDICINE

## 2020-10-14 PROCEDURE — 99207 PR NO CHARGE NURSE ONLY: CPT

## 2020-10-14 PROCEDURE — 85610 PROTHROMBIN TIME: CPT | Performed by: INTERNAL MEDICINE

## 2020-10-14 NOTE — TELEPHONE ENCOUNTER
ANTICOAGULATION MANAGEMENT      Alvin Ontiveros due for annual renewal of referral to anticoagulation monitoring. Order pended for your review and signature.      ANTICOAGULATION SUMMARY      Warfarin indication(s)     DVT  PE  coagulation defect    Heart valve present?  NO       Current goal range   INR: 2.0-3.0     Goal appropriate for indication? Yes, INR 2-3 appropriate for hx of DVT, PE, hypercoagulable state, Afib, LVAD, or bileaflet AVR without risk factors     Current duration of therapy Indefinite/long term therapy   Time in Therapeutic Range (TTR)  (Goal > 60%) 82.1 %       Office visit with referring provider's group within last year yes on 1/14/20       Kari Lizarraga RN

## 2020-10-14 NOTE — PROGRESS NOTES
ANTICOAGULATION FOLLOW-UP CLINIC VISIT    Patient Name:  Alvin Ontiveros  Date:  10/14/2020  Contact Type:  Telephone/ discussed with patient'    SUBJECTIVE:  Patient Findings     Positives:  Change in diet/appetite (Has not had as many green veggies recently.  He would like to try eating more green veggies to help lower INR.  Will consider maintenance dose adjustment with next check if INR remains elevated.)    Comments:  The patient was assessed for medication, and activity level changes, missed or extra doses, bruising or bleeding, with no problem findings.  Patient did lower his dose after last visit.        Clinical Outcomes     Negatives:  Major bleeding event, Thromboembolic event, Anticoagulation-related hospital admission, Anticoagulation-related ED visit, Anticoagulation-related fatality    Comments:  The patient was assessed for medication, and activity level changes, missed or extra doses, bruising or bleeding, with no problem findings.  Patient did lower his dose after last visit.           OBJECTIVE    Recent labs: (last 7 days)     10/14/20  1528   INR 3.50*       ASSESSMENT / PLAN  INR assessment SUPRA    Recheck INR In: 2 WEEKS    INR Location Clinic      Anticoagulation Summary  As of 10/14/2020    INR goal:  2.0-3.0   TTR:  82.1 % (1 y)   INR used for dosing:  3.50 (10/14/2020)   Warfarin maintenance plan:  2.5 mg (5 mg x 0.5) every Sun; 5 mg (5 mg x 1) all other days   Full warfarin instructions:  10/15: Hold; Otherwise 2.5 mg every Sun; 5 mg all other days   Weekly warfarin total:  32.5 mg   Plan last modified:  Luna Oakley RN (2/11/2019)   Next INR check:  10/28/2020   Priority:  Maintenance   Target end date:      Indications    Long-term (current) use of anticoagulants [Z79.01] [Z79.01]  Heterozygous factor V Leiden mutation (H) [D68.51]  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]  Personal history of venous thrombosis and embolism [Z86.718]             Anticoagulation Episode  Summary     INR check location:      Preferred lab:      Send INR reminders to:  [facility]    Comments:  Takes in AM      Anticoagulation Care Providers     Provider Role Specialty Phone number    Du Almaraz MD [facility] Internal Medicine [phone]            See the Encounter Report to view Anticoagulation Flowsheet and Dosing Calendar (Go to Encounters tab in chart review, and find the Anticoagulation Therapy Visit)    Dosage adjustment made based on physician directed care plan.    Kari Lizarraga RN

## 2020-10-22 ENCOUNTER — DOCUMENTATION ONLY (OUTPATIENT)
Dept: INTERNAL MEDICINE | Facility: CLINIC | Age: 65
End: 2020-10-22

## 2020-10-22 DIAGNOSIS — E78.5 HYPERLIPIDEMIA LDL GOAL <130: Primary | ICD-10-CM

## 2020-10-22 DIAGNOSIS — Z12.5 SCREENING PSA (PROSTATE SPECIFIC ANTIGEN): ICD-10-CM

## 2020-10-22 DIAGNOSIS — R73.03 PREDIABETES: ICD-10-CM

## 2020-10-22 DIAGNOSIS — I10 ESSENTIAL HYPERTENSION: ICD-10-CM

## 2020-10-28 ENCOUNTER — ANTICOAGULATION THERAPY VISIT (OUTPATIENT)
Dept: ANTICOAGULATION | Facility: CLINIC | Age: 65
End: 2020-10-28

## 2020-10-28 DIAGNOSIS — Z79.01 LONG TERM CURRENT USE OF ANTICOAGULANT THERAPY: ICD-10-CM

## 2020-10-28 DIAGNOSIS — D68.52 PROTHROMBIN GENE MUTATION (H): ICD-10-CM

## 2020-10-28 DIAGNOSIS — Z86.718 PERSONAL HISTORY OF VENOUS THROMBOSIS AND EMBOLISM: ICD-10-CM

## 2020-10-28 DIAGNOSIS — D68.51 HETEROZYGOUS FACTOR V LEIDEN MUTATION (H): ICD-10-CM

## 2020-10-28 LAB
CAPILLARY BLOOD COLLECTION: NORMAL
INR PPP: 3.5 (ref 0.86–1.14)

## 2020-10-28 PROCEDURE — 36416 COLLJ CAPILLARY BLOOD SPEC: CPT | Performed by: INTERNAL MEDICINE

## 2020-10-28 PROCEDURE — 99207 PR NO CHARGE NURSE ONLY: CPT

## 2020-10-28 PROCEDURE — 85610 PROTHROMBIN TIME: CPT | Performed by: INTERNAL MEDICINE

## 2020-10-28 NOTE — PROGRESS NOTES
ANTICOAGULATION FOLLOW-UP CLINIC VISIT    Patient Name:  Alvin Ontiveros  Date:  10/28/2020  Contact Type:  Telephone/ discussed with patient    SUBJECTIVE:  Patient Findings     Positives:  Change in diet/appetite (Did not increase his salad intake as discussed at last visit.  He will try to increase his green veggie intake.)    Comments:  Already took his warfarin today.  INR has been consistently elevated, so maintenance dose was decreased by 7.7%        Clinical Outcomes     Negatives:  Major bleeding event, Thromboembolic event, Anticoagulation-related hospital admission, Anticoagulation-related ED visit, Anticoagulation-related fatality    Comments:  Already took his warfarin today.  INR has been consistently elevated, so maintenance dose was decreased by 7.7%           OBJECTIVE    Recent labs: (last 7 days)     10/28/20  1524   INR 3.50*       ASSESSMENT / PLAN  INR assessment SUPRA    Recheck INR In: 2 WEEKS    INR Location Clinic      Anticoagulation Summary  As of 10/28/2020    INR goal:  2.0-3.0   TTR:  78.7 % (1 y)   INR used for dosing:  3.50 (10/28/2020)   Warfarin maintenance plan:  2.5 mg (5 mg x 0.5) every Sun, Fri; 5 mg (5 mg x 1) all other days   Full warfarin instructions:  10/29: Hold; Otherwise 2.5 mg every Sun, Fri; 5 mg all other days   Weekly warfarin total:  30 mg   Plan last modified:  Kari Lizarraga RN (10/28/2020)   Next INR check:  11/11/2020   Priority:  Maintenance   Target end date:      Indications    Long-term (current) use of anticoagulants [Z79.01] [Z79.01]  Heterozygous factor V Leiden mutation (H) [D68.51]  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]  Personal history of venous thrombosis and embolism [Z86.718]             Anticoagulation Episode Summary     INR check location:      Preferred lab:      Send INR reminders to:  [facility]    Comments:  Takes in AM      Anticoagulation Care Providers     Provider Role Specialty Phone number    Du Almaraz  MD [facility] Internal Medicine [phone]            See the Encounter Report to view Anticoagulation Flowsheet and Dosing Calendar (Go to Encounters tab in chart review, and find the Anticoagulation Therapy Visit)    Dosage adjustment made based on physician directed care plan.    Kari Lizarraga RN

## 2020-11-03 DIAGNOSIS — E78.5 HYPERLIPIDEMIA LDL GOAL <130: ICD-10-CM

## 2020-11-03 DIAGNOSIS — Z12.5 SCREENING PSA (PROSTATE SPECIFIC ANTIGEN): ICD-10-CM

## 2020-11-03 DIAGNOSIS — R73.03 PREDIABETES: ICD-10-CM

## 2020-11-03 LAB
ALBUMIN SERPL-MCNC: 3.7 G/DL (ref 3.4–5)
ALP SERPL-CCNC: 55 U/L (ref 40–150)
ALT SERPL W P-5'-P-CCNC: 31 U/L (ref 0–70)
ANION GAP SERPL CALCULATED.3IONS-SCNC: 2 MMOL/L (ref 3–14)
AST SERPL W P-5'-P-CCNC: 21 U/L (ref 0–45)
BILIRUB SERPL-MCNC: 1 MG/DL (ref 0.2–1.3)
BUN SERPL-MCNC: 14 MG/DL (ref 7–30)
CALCIUM SERPL-MCNC: 11 MG/DL (ref 8.5–10.1)
CHLORIDE SERPL-SCNC: 104 MMOL/L (ref 94–109)
CHOLEST SERPL-MCNC: 202 MG/DL
CO2 SERPL-SCNC: 31 MMOL/L (ref 20–32)
CREAT SERPL-MCNC: 0.87 MG/DL (ref 0.66–1.25)
GFR SERPL CREATININE-BSD FRML MDRD: 90 ML/MIN/{1.73_M2}
GLUCOSE SERPL-MCNC: 122 MG/DL (ref 70–99)
HBA1C MFR BLD: 5.5 % (ref 0–5.6)
HDLC SERPL-MCNC: 84 MG/DL
LDLC SERPL CALC-MCNC: 97 MG/DL
NONHDLC SERPL-MCNC: 118 MG/DL
POTASSIUM SERPL-SCNC: 4.1 MMOL/L (ref 3.4–5.3)
PROT SERPL-MCNC: 7.4 G/DL (ref 6.8–8.8)
PSA SERPL-ACNC: 0.88 UG/L (ref 0–4)
SODIUM SERPL-SCNC: 137 MMOL/L (ref 133–144)
TRIGL SERPL-MCNC: 103 MG/DL

## 2020-11-03 PROCEDURE — 80053 COMPREHEN METABOLIC PANEL: CPT | Performed by: INTERNAL MEDICINE

## 2020-11-03 PROCEDURE — G0103 PSA SCREENING: HCPCS | Performed by: INTERNAL MEDICINE

## 2020-11-03 PROCEDURE — 83036 HEMOGLOBIN GLYCOSYLATED A1C: CPT | Performed by: INTERNAL MEDICINE

## 2020-11-03 PROCEDURE — 80061 LIPID PANEL: CPT | Performed by: INTERNAL MEDICINE

## 2020-11-10 ENCOUNTER — TELEPHONE (OUTPATIENT)
Dept: INTERNAL MEDICINE | Facility: CLINIC | Age: 65
End: 2020-11-10

## 2020-11-10 ENCOUNTER — ANTICOAGULATION THERAPY VISIT (OUTPATIENT)
Dept: ANTICOAGULATION | Facility: CLINIC | Age: 65
End: 2020-11-10

## 2020-11-10 ENCOUNTER — OFFICE VISIT (OUTPATIENT)
Dept: INTERNAL MEDICINE | Facility: CLINIC | Age: 65
End: 2020-11-10
Payer: COMMERCIAL

## 2020-11-10 VITALS
OXYGEN SATURATION: 100 % | HEART RATE: 85 BPM | SYSTOLIC BLOOD PRESSURE: 138 MMHG | DIASTOLIC BLOOD PRESSURE: 82 MMHG | BODY MASS INDEX: 29.63 KG/M2 | HEIGHT: 73 IN | RESPIRATION RATE: 18 BRPM | TEMPERATURE: 97.5 F | WEIGHT: 223.6 LBS

## 2020-11-10 DIAGNOSIS — Z79.01 LONG TERM CURRENT USE OF ANTICOAGULANT THERAPY: ICD-10-CM

## 2020-11-10 DIAGNOSIS — Z86.718 PERSONAL HISTORY OF VENOUS THROMBOSIS AND EMBOLISM: ICD-10-CM

## 2020-11-10 DIAGNOSIS — K21.9 GASTROESOPHAGEAL REFLUX DISEASE WITHOUT ESOPHAGITIS: ICD-10-CM

## 2020-11-10 DIAGNOSIS — D68.51 HETEROZYGOUS FACTOR V LEIDEN MUTATION (H): ICD-10-CM

## 2020-11-10 DIAGNOSIS — D68.51 HETEROZYGOUS FACTOR V LEIDEN MUTATION (H): Primary | ICD-10-CM

## 2020-11-10 DIAGNOSIS — Z00.00 ENCOUNTER FOR MEDICARE ANNUAL WELLNESS EXAM: Primary | ICD-10-CM

## 2020-11-10 DIAGNOSIS — D68.52 PROTHROMBIN GENE MUTATION (H): ICD-10-CM

## 2020-11-10 DIAGNOSIS — E78.5 HYPERLIPIDEMIA LDL GOAL <130: ICD-10-CM

## 2020-11-10 DIAGNOSIS — R73.03 PREDIABETES: ICD-10-CM

## 2020-11-10 DIAGNOSIS — I10 ESSENTIAL HYPERTENSION: ICD-10-CM

## 2020-11-10 DIAGNOSIS — Z23 NEED FOR VACCINATION: ICD-10-CM

## 2020-11-10 LAB
CAPILLARY BLOOD COLLECTION: NORMAL
INR PPP: 2.5 (ref 0.86–1.14)

## 2020-11-10 PROCEDURE — 99397 PER PM REEVAL EST PAT 65+ YR: CPT | Mod: 25 | Performed by: INTERNAL MEDICINE

## 2020-11-10 PROCEDURE — 99207 PR NO CHARGE NURSE ONLY: CPT

## 2020-11-10 PROCEDURE — 36416 COLLJ CAPILLARY BLOOD SPEC: CPT | Performed by: INTERNAL MEDICINE

## 2020-11-10 PROCEDURE — G0009 ADMIN PNEUMOCOCCAL VACCINE: HCPCS | Mod: 59 | Performed by: INTERNAL MEDICINE

## 2020-11-10 PROCEDURE — 90715 TDAP VACCINE 7 YRS/> IM: CPT | Performed by: INTERNAL MEDICINE

## 2020-11-10 PROCEDURE — 90732 PPSV23 VACC 2 YRS+ SUBQ/IM: CPT | Performed by: INTERNAL MEDICINE

## 2020-11-10 PROCEDURE — 99213 OFFICE O/P EST LOW 20 MIN: CPT | Mod: 25 | Performed by: INTERNAL MEDICINE

## 2020-11-10 PROCEDURE — 90471 IMMUNIZATION ADMIN: CPT | Performed by: INTERNAL MEDICINE

## 2020-11-10 PROCEDURE — 85610 PROTHROMBIN TIME: CPT | Performed by: INTERNAL MEDICINE

## 2020-11-10 RX ORDER — PRAVASTATIN SODIUM 40 MG
TABLET ORAL
Qty: 90 TABLET | Refills: 0 | Status: SHIPPED | OUTPATIENT
Start: 2020-11-10 | End: 2021-01-07

## 2020-11-10 RX ORDER — LOSARTAN POTASSIUM 100 MG/1
100 TABLET ORAL DAILY
Qty: 90 TABLET | Refills: 0 | Status: SHIPPED | OUTPATIENT
Start: 2020-11-10 | End: 2020-11-10

## 2020-11-10 RX ORDER — FOLIC ACID 1 MG/1
1000 TABLET ORAL DAILY
Qty: 102 TABLET | Refills: 0 | Status: SHIPPED | OUTPATIENT
Start: 2020-11-10 | End: 2021-01-07

## 2020-11-10 RX ORDER — FOLIC ACID 1 MG/1
1000 TABLET ORAL DAILY
Qty: 102 TABLET | Refills: 3 | Status: SHIPPED | OUTPATIENT
Start: 2020-11-10 | End: 2020-11-10

## 2020-11-10 RX ORDER — PRAVASTATIN SODIUM 40 MG
TABLET ORAL
Qty: 90 TABLET | Refills: 0 | Status: SHIPPED | OUTPATIENT
Start: 2020-11-10 | End: 2020-11-10

## 2020-11-10 RX ORDER — LOSARTAN POTASSIUM 100 MG/1
100 TABLET ORAL DAILY
Qty: 90 TABLET | Refills: 0 | Status: SHIPPED | OUTPATIENT
Start: 2020-11-10 | End: 2021-01-07

## 2020-11-10 RX ORDER — HYDROCHLOROTHIAZIDE 25 MG/1
25 TABLET ORAL DAILY
Qty: 90 TABLET | Refills: 0 | Status: SHIPPED | OUTPATIENT
Start: 2020-11-10 | End: 2020-11-10

## 2020-11-10 RX ORDER — HYDROCHLOROTHIAZIDE 25 MG/1
25 TABLET ORAL DAILY
Qty: 90 TABLET | Refills: 0 | Status: SHIPPED | OUTPATIENT
Start: 2020-11-10 | End: 2021-01-07

## 2020-11-10 SDOH — SOCIAL STABILITY: SOCIAL NETWORK: IN A TYPICAL WEEK, HOW MANY TIMES DO YOU TALK ON THE PHONE WITH FAMILY, FRIENDS, OR NEIGHBORS?: NOT ASKED

## 2020-11-10 SDOH — SOCIAL STABILITY: SOCIAL INSECURITY
WITHIN THE LAST YEAR, HAVE TO BEEN RAPED OR FORCED TO HAVE ANY KIND OF SEXUAL ACTIVITY BY YOUR PARTNER OR EX-PARTNER?: NO

## 2020-11-10 SDOH — SOCIAL STABILITY: SOCIAL NETWORK: HOW OFTEN DO YOU ATTEND CHURCH OR RELIGIOUS SERVICES?: NOT ASKED

## 2020-11-10 SDOH — HEALTH STABILITY: MENTAL HEALTH: HOW MANY STANDARD DRINKS CONTAINING ALCOHOL DO YOU HAVE ON A TYPICAL DAY?: 1 OR 2

## 2020-11-10 SDOH — SOCIAL STABILITY: SOCIAL NETWORK: HOW OFTEN DO YOU GET TOGETHER WITH FRIENDS OR RELATIVES?: NOT ASKED

## 2020-11-10 SDOH — SOCIAL STABILITY: SOCIAL INSECURITY: WITHIN THE LAST YEAR, HAVE YOU BEEN HUMILIATED OR EMOTIONALLY ABUSED IN OTHER WAYS BY YOUR PARTNER OR EX-PARTNER?: NO

## 2020-11-10 SDOH — HEALTH STABILITY: MENTAL HEALTH: HOW OFTEN DO YOU HAVE A DRINK CONTAINING ALCOHOL?: 2-3 TIMES A WEEK

## 2020-11-10 SDOH — SOCIAL STABILITY: SOCIAL NETWORK
DO YOU BELONG TO ANY CLUBS OR ORGANIZATIONS SUCH AS CHURCH GROUPS UNIONS, FRATERNAL OR ATHLETIC GROUPS, OR SCHOOL GROUPS?: NOT ASKED

## 2020-11-10 SDOH — SOCIAL STABILITY: SOCIAL INSECURITY: WITHIN THE LAST YEAR, HAVE YOU BEEN AFRAID OF YOUR PARTNER OR EX-PARTNER?: NO

## 2020-11-10 SDOH — HEALTH STABILITY: MENTAL HEALTH: HOW OFTEN DO YOU HAVE 6 OR MORE DRINKS ON ONE OCCASION?: NEVER

## 2020-11-10 SDOH — SOCIAL STABILITY: SOCIAL NETWORK: ARE YOU MARRIED, WIDOWED, DIVORCED, SEPARATED, NEVER MARRIED, OR LIVING WITH A PARTNER?: MARRIED

## 2020-11-10 SDOH — SOCIAL STABILITY: SOCIAL NETWORK: HOW OFTEN DO YOU ATTENT MEETINGS OF THE CLUB OR ORGANIZATION YOU BELONG TO?: NOT ASKED

## 2020-11-10 SDOH — SOCIAL STABILITY: SOCIAL INSECURITY
WITHIN THE LAST YEAR, HAVE YOU BEEN KICKED, HIT, SLAPPED, OR OTHERWISE PHYSICALLY HURT BY YOUR PARTNER OR EX-PARTNER?: NO

## 2020-11-10 ASSESSMENT — ENCOUNTER SYMPTOMS
COUGH: 0
ABDOMINAL PAIN: 0
NERVOUS/ANXIOUS: 0
DYSURIA: 0
PALPITATIONS: 0
HEMATOCHEZIA: 0
WEAKNESS: 0
SORE THROAT: 0
PARESTHESIAS: 0
MYALGIAS: 0
CHILLS: 0
SHORTNESS OF BREATH: 0
FREQUENCY: 0
NAUSEA: 0
HEMATURIA: 0
ARTHRALGIAS: 0
DIZZINESS: 0
CONSTIPATION: 0
FEVER: 0
DIARRHEA: 0
EYE PAIN: 0
JOINT SWELLING: 0
HEARTBURN: 0
HEADACHES: 0

## 2020-11-10 ASSESSMENT — ACTIVITIES OF DAILY LIVING (ADL): CURRENT_FUNCTION: NO ASSISTANCE NEEDED

## 2020-11-10 ASSESSMENT — MIFFLIN-ST. JEOR: SCORE: 1848.12

## 2020-11-10 NOTE — LETTER
"  [facility]  [address] [place] [address]  [phone]    11/10/2020    Alvin Ontiveros  [address] [facility] [address]           Dear Cindy Ontiveros,    The results of your lab tests are enclosed. Everything looks fine. Unless noted otherwise below, any results that are outside the \"normal\" range are within acceptable limits and are of no concern.    Your glucose (blood sugar) is slightly elevated, but not yet in diabetes range which is over 126. To avoid diabetes in the future, reduce your intake of sweets, starches (like bread, pasta, rice and potatoes), walk regularly, and maintain or reduce your weight.    Your A1c is currently in the non-diabetic range.     LDL= Bad Cholesterol-- the target is below 100.     HDL= Good Cholesterol-- although this is determined mostly by heredity, exercise and/or medications may sometimes raise this number.    Triglycerides are another type of fat in the blood, and can sometimes be lowered by reducing intake of sweets or excess carbohydrates, alcohol, and by weight reduction if needed.  Sometimes medications are also used.    AST and ALT are liver tests, as are the bilirubin (total and direct), albumin, total protein, and alkaline phosphatase. Yours are all normal.     Urea Nitrogen and Creatinine are kidney tests--yours are normal. GFR stands for Glomerular Filtration Rate, a more precise estimate of kidney function.    Sodium, Potassium, Chloride, Carbon Dioxide, and Calcium are all normal salts in the bloodstream. Yours all look normal. (You can ignore the anion gap result).    PSA is a screening test for prostate cancer. Yours is normal.              If you have any further questions or problems, please contact our office.    Sincerely,        Du Almaraz MD  Attachment: Lab results     "

## 2020-11-10 NOTE — PATIENT INSTRUCTIONS
Patient Education   Personalized Prevention Plan  You are due for the preventive services outlined below.  Your care team is available to assist you in scheduling these services.  If you have already completed any of these items, please share that information with your care team to update in your medical record.  Health Maintenance Due   Topic Date Due     Hepatitis C Screening  09/03/1973     Zoster (Shingles) Vaccine (1 of 2) 09/03/2004     Annual Wellness Visit  09/03/2019     Pneumococcal Vaccine (1 of 1 - PPSV23) 09/03/2019     AORTIC ANEURYSM SCREENING (SYSTEM ASSIGNED)  09/03/2019     Flu Vaccine (1) 09/01/2020     Diptheria Tetanus Pertussis (DTAP/TDAP/TD) Vaccine (3 - Td) 10/20/2020             Everything looks fine!    Refills of medications have been faxed to your pharmacy.     Recent lab results look fine.     See you in a year, sooner if problems.

## 2020-11-10 NOTE — PROGRESS NOTES
ANTICOAGULATION FOLLOW-UP CLINIC VISIT    Patient Name:  Alvin Ontiveros  Date:  11/10/2020  Contact Type:  Telephone/ discussed with patient    SUBJECTIVE:  Patient Findings     Comments:  The patient was assessed for diet, medication, and activity level changes, missed or extra doses, bruising or bleeding, with no problem findings.          Clinical Outcomes     Negatives:  Major bleeding event, Thromboembolic event, Anticoagulation-related hospital admission, Anticoagulation-related ED visit, Anticoagulation-related fatality    Comments:  The patient was assessed for diet, medication, and activity level changes, missed or extra doses, bruising or bleeding, with no problem findings.             OBJECTIVE    Recent labs: (last 7 days)     11/10/20  1153   INR 2.50*       ASSESSMENT / PLAN  INR assessment THER    Recheck INR In: 4 WEEKS    INR Location Clinic      Anticoagulation Summary  As of 11/10/2020    INR goal:  2.0-3.0   TTR:  80.3 % (1 y)   INR used for dosin.50 (11/10/2020)   Warfarin maintenance plan:  2.5 mg (5 mg x 0.5) every Sun, Fri; 5 mg (5 mg x 1) all other days   Full warfarin instructions:  2.5 mg every Sun, Fri; 5 mg all other days   Weekly warfarin total:  30 mg   No change documented:  Kari Lizarraga RN   Plan last modified:  Kari Lizarraga RN (10/28/2020)   Next INR check:  2020   Priority:  Maintenance   Target end date:      Indications    Long-term (current) use of anticoagulants [Z79.01] [Z79.01]  Heterozygous factor V Leiden mutation (H) [D68.51]  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]  Personal history of venous thrombosis and embolism [Z86.718]             Anticoagulation Episode Summary     INR check location:      Preferred lab:      Send INR reminders to:  [facility]    Comments:  Takes in AM      Anticoagulation Care Providers     Provider Role Specialty Phone number    Du Almaraz MD [facility] Internal Medicine [phone]            See  the Encounter Report to view Anticoagulation Flowsheet and Dosing Calendar (Go to Encounters tab in chart review, and find the Anticoagulation Therapy Visit)    Dosage adjustment made based on physician directed care plan.    Kari Lizarraga RN

## 2020-11-10 NOTE — PROGRESS NOTES
"SUBJECTIVE:   Alvin Ontivreos is a 65 year old male who presents for Preventive Visit.    Patient has been advised of split billing requirements and indicates understanding: Yes   Are you in the first 12 months of your Medicare coverage?  No    Healthy Habits:     In general, how would you rate your overall health?  Excellent    Frequency of exercise:  4-5 days/week    Duration of exercise:  30-45 minutes    Do you usually eat at least 4 servings of fruit and vegetables a day, include whole grains    & fiber and avoid regularly eating high fat or \"junk\" foods?  Yes    Taking medications regularly:  Yes    Medication side effects:  None    Ability to successfully perform activities of daily living:  No assistance needed    Home Safety:  No safety concerns identified    Hearing Impairment:  Difficulty following a conversation in a noisy restaurant or crowded room    In the past 6 months, have you been bothered by leaking of urine?  No    In general, how would you rate your overall mental or emotional health?  Excellent      PHQ-2 Total Score: 0    Additional concerns today:  No    Do you feel safe in your environment? Yes    Have you ever done Advance Care Planning? (For example, a Health Directive, POLST, or a discussion with a medical provider or your loved ones about your wishes): No, advance care planning information given to patient to review.  Patient declined advance care planning discussion at this time.      Fall risk  Fallen 2 or more times in the past year?: No  Any fall with injury in the past year?: No    Cognitive Screening   1) Repeat 3 items (Leader, Season, Table)    2) Clock draw: NORMAL  3) 3 item recall: Recalls 1 object   Results: NORMAL clock, 1-2 items recalled: COGNITIVE IMPAIRMENT LESS LIKELY    Mini-CogTM Copyright GUERO Coles. Licensed by the author for use in Health system; reprinted with permission (ekta@.[facility]). All rights reserved.      Do you have sleep apnea, excessive snoring " or daytime drowsiness?: no    Reviewed and updated as needed this visit by clinical staff                 Reviewed and updated as needed this visit by Provider                Social History     Tobacco Use     Smoking status: Former Smoker     Packs/day: 1.00     Years: 30.00     Pack years: 30.00     Types: Cigarettes     Quit date: 2005     Years since quittin.9     Smokeless tobacco: Never Used   Substance Use Topics     Alcohol use: Yes     Comment: 2-3 a day         Alcohol Use 3/7/2014   Prescreen: >3 drinks/day or >7 drinks/week? The patient does not drink >3 drinks per day nor >7 drinks per week.         PROBLEMS TO ADD ON...In addition to an Annual Wellness Exam, we addressed hypertension, hyperlipidemia, prediabetes, GERD, prior history of venous thromboembolism on chronic oral anticoagulation.     The patient is tolerating his current medications without any adverse effects.     His blood pressure appears satisfactorily controlled on current meds.     Recent LDL-Cholesterol also appears to be at target.   Most recent glucose remains in prediabetes range, although his A1c is improved.     Reflux symptoms are well controlled on omeprazole.     He remains on chronic oral anticoagulation. Chart review from our 2006 encounter shows that the patient had tested heterozygous for both Factor V Leiden and for Factor II [id number] gene mutations. The hematologist advised life-long anticoagulation at that time. He reports no symptoms to suggest DVT or PE.     He notes some chronic numbness on one of his toes, and is reassured that this is not likely to be of serious cause.     Current providers sharing in care for this patient include:   Patient Care Team:  Du Almaraz MD as PCP - General  Du Almaraz MD as Assigned PCP    The following health maintenance items are reviewed in Epic and correct as of today:  Health Maintenance   Topic Date Due     HEPATITIS C SCREENING  1972     ZOSTER  "IMMUNIZATION (1 of 2) 09/03/2004     MEDICARE ANNUAL WELLNESS VISIT  09/03/2019     Pneumococcal Vaccine: 65+ Years (1 of 1 - PPSV23) 09/03/2019     AORTIC ANEURYSM SCREENING (SYSTEM ASSIGNED)  09/03/2019     INFLUENZA VACCINE (1) 09/01/2020     DTAP/TDAP/TD IMMUNIZATION (3 - Td) 10/20/2020     FALL RISK ASSESSMENT  01/14/2021     ADVANCE CARE PLANNING  09/19/2022     LIPID  11/03/2025     COLORECTAL CANCER SCREENING  09/18/2029     PHQ-2  Completed     Pneumococcal Vaccine: Pediatrics (0 to 5 Years) and At-Risk Patients (6 to 64 Years)  Aged Out     IPV IMMUNIZATION  Aged Out     MENINGITIS IMMUNIZATION  Aged Out     HEPATITIS B IMMUNIZATION  Aged Out     Pneumonia Vaccine: Advised Pneumovax-23 today.     Review of Systems   Constitutional: Negative for chills and fever.   HENT: Negative for congestion, ear pain, hearing loss and sore throat.    Eyes: Negative for pain and visual disturbance.   Respiratory: Negative for cough and shortness of breath.    Cardiovascular: Negative for chest pain, palpitations and peripheral edema.   Gastrointestinal: Negative for abdominal pain, constipation, diarrhea, heartburn, hematochezia and nausea.   Genitourinary: Positive for impotence. Negative for discharge, dysuria, frequency, genital sores, hematuria and urgency.   Musculoskeletal: Negative for arthralgias, joint swelling and myalgias.   Skin: Negative for rash.   Neurological: Negative for dizziness, weakness, headaches and paresthesias.   Psychiatric/Behavioral: Negative for mood changes. The patient is not nervous/anxious.        OBJECTIVE:   Vitals: /82   Pulse 85   Temp 97.5  F (36.4  C) (Oral)   Resp 18   Ht 1.854 m (6' 1\")   Wt 101.4 kg (223 lb 9.6 oz)   SpO2 100%   BMI 29.50 kg/m    BMI= Body mass index is 29.5 kg/m .     Physical Exam  GENERAL: healthy, alert and no distress  EYES: Eyes grossly normal to inspection, PERRL and conjunctivae and sclerae normal  HENT: ear canals and TM's normal, nose and " mouth without ulcers or lesions  NECK: no adenopathy, no asymmetry, masses, or scars and thyroid normal to palpation  RESP: lungs clear to auscultation - no rales, rhonchi or wheezes  CV: regular rate and rhythm, normal S1 S2, no S3 or S4, no murmur, click or rub, no peripheral edema and peripheral pulses strong  ABDOMEN: soft, nontender, no hepatosplenomegaly, no masses and bowel sounds normal   (male): not examined.  RECTAL: normal sphincter tone, no rectal masses, prostate normal size, smooth, nontender without nodules or masses  MS: no gross musculoskeletal defects noted, no edema  SKIN: no suspicious lesions or rashes  NEURO: Normal strength and tone, mentation intact and speech normal  PSYCH: mentation appears normal, affect normal/bright    Diagnostic Test Results:  Labs reviewed in Epic    ASSESSMENT / PLAN:   (Z00.00) Encounter for Medicare annual wellness exam  (primary encounter diagnosis)  Comment: Stable health. See epic orders.   Plan: Capillary Blood Collection          (I10) Essential hypertension  Comment: BP satisfactorily controlled. Continue current meds.   Plan: hydrochlorothiazide (HYDRODIURIL) 25 MG tablet,        losartan (COZAAR) 100 MG tablet, OFFICE/OUTPT         VISIT,EST,LEVL IV, DISCONTINUED:         hydrochlorothiazide (HYDRODIURIL) 25 MG tablet,        DISCONTINUED: losartan (COZAAR) 100 MG tablet          (E78.5) Hyperlipidemia LDL goal <130  Comment: Recent LDL at target. Continue current meds.   Plan: pravastatin (PRAVACHOL) 40 MG tablet,         OFFICE/OUTPT VISIT,EST,LEVL IV, DISCONTINUED:         pravastatin (PRAVACHOL) 40 MG tablet          (K21.9) Gastroesophageal reflux disease without esophagitis  Comment: Well controlled. Continue current meds.   Plan: omeprazole (PRILOSEC) 20 MG DR capsule,         OFFICE/OUTPT VISIT,EST,LEVL IV, DISCONTINUED:         omeprazole (PRILOSEC) 20 MG DR capsule          (R73.03) Prediabetes  Comment: Continue working on non-Rx measures.  "  Plan: folic acid (FOLVITE) 1 MG tablet, OFFICE/OUTPT         VISIT,EST,LEVL IV, DISCONTINUED: folic acid         (FOLVITE) 1 MG tablet          (Z86.718) Personal history of venous thrombosis and embolism  (D68.51) Heterozygous factor V Leiden mutation (H)  (Z79.01) Long-term (current) use of anticoagulants [Z79.01]  Comment: Continue chronic oral anticoagulation.   Plan: OFFICE/OUTPT VISIT,EST,LEVL IV          (Z23) Need for vaccination  Plan: Pneumococcal vaccine 23 valent PPSV23          (Pneumovax) [37094], TDAP VACCINE (Adacel,         Boostrix)  [3379477], CANCELED: Pneumococcal         vaccine 23 valent PPSV23  (Pneumovax) [28437],         CANCELED: TDAP VACCINE (Adacel, Boostrix)          [2655896]            Patient has been advised of split billing requirements and indicates understanding: Yes  COUNSELING:  Reviewed preventive health counseling, as reflected in patient instructions    Estimated body mass index is 30.87 kg/m  as calculated from the following:    Height as of 1/14/20: 1.854 m (6' 1\").    Weight as of 1/14/20: 106.1 kg (234 lb).        He reports that he quit smoking about 14 years ago. His smoking use included cigarettes. He has a 30.00 pack-year smoking history. He has never used smokeless tobacco.      Appropriate preventive services were discussed with this patient, including applicable screening as appropriate for cardiovascular disease, diabetes, osteopenia/osteoporosis, and glaucoma.  As appropriate for age/gender, discussed screening for colorectal cancer, prostate cancer, breast cancer, and cervical cancer. Checklist reviewing preventive services available has been given to the patient.    Reviewed patients plan of care and provided an AVS. The Basic Care Plan (routine screening as documented in Health Maintenance) for Alvin meets the Care Plan requirement. This Care Plan has been established and reviewed with the Patient.    Counseling Resources:  ATP IV Guidelines  Pooled " Cohorts Equation Calculator  Breast Cancer Risk Calculator  Breast Cancer: Medication to Reduce Risk  FRAX Risk Assessment  ICSI Preventive Guidelines  Dietary Guidelines for Americans, 2010  USDA's MyPlate  ASA Prophylaxis  Lung CA Screening    Du Almaraz MD,   [facility]    Identified Health Risks:

## 2020-11-10 NOTE — TELEPHONE ENCOUNTER
ANTICOAGULATION MANAGEMENT      Alvin Ontiveros due for annual renewal of referral to anticoagulation monitoring. Order pended for your review and signature.      ANTICOAGULATION SUMMARY      Warfarin indication(s)     DVT  PE  Factor V leiden    Heart valve present?  NO       Current goal range   INR: 2.0-3.0     Goal appropriate for indication? Yes, INR 2-3 appropriate for hx of DVT, PE, hypercoagulable state, Afib, LVAD, or bileaflet AVR without risk factors     Current duration of therapy Indefinite/long term therapy   Time in Therapeutic Range (TTR)  (Goal > 60%) 80.3 %       Office visit with referring provider's group within last year yes on today       Kari Lizarraga RN

## 2020-12-14 ENCOUNTER — ANTICOAGULATION THERAPY VISIT (OUTPATIENT)
Dept: ANTICOAGULATION | Facility: CLINIC | Age: 65
End: 2020-12-14

## 2020-12-14 DIAGNOSIS — D68.52 PROTHROMBIN GENE MUTATION (H): ICD-10-CM

## 2020-12-14 DIAGNOSIS — Z86.718 PERSONAL HISTORY OF VENOUS THROMBOSIS AND EMBOLISM: ICD-10-CM

## 2020-12-14 DIAGNOSIS — Z79.01 LONG TERM CURRENT USE OF ANTICOAGULANT THERAPY: ICD-10-CM

## 2020-12-14 DIAGNOSIS — D68.51 HETEROZYGOUS FACTOR V LEIDEN MUTATION (H): ICD-10-CM

## 2020-12-14 LAB
CAPILLARY BLOOD COLLECTION: NORMAL
INR PPP: 2.4 (ref 0.86–1.14)

## 2020-12-14 PROCEDURE — 99207 PR NO CHARGE NURSE ONLY: CPT

## 2020-12-14 PROCEDURE — 36416 COLLJ CAPILLARY BLOOD SPEC: CPT | Performed by: INTERNAL MEDICINE

## 2020-12-14 PROCEDURE — 85610 PROTHROMBIN TIME: CPT | Performed by: INTERNAL MEDICINE

## 2020-12-14 NOTE — PROGRESS NOTES
Left message to call [phone]. Please transfer call to Luna at [phone].    Anticoagulation Management    Unable to reach Alvin today.    Today's INR result of 2.4 is therapeutic (goal INR of 2.0-3.0).  Result received from: Clinic Lab    Follow up required to confirm warfarin dose taken and assess for changes    Left message to continue current dose of warfarin 5 mg tonight.      Anticoagulation clinic to follow up    Luna Oakley RN

## 2020-12-15 NOTE — PROGRESS NOTES
ANTICOAGULATION FOLLOW-UP CLINIC VISIT    Patient Name:  Alvin Ontiveros  Date:  12/15/2020  Contact Type:  Telephone/ Patient returned call, he denies any changes. Orders discussed with the patient, he agrees with the plan. Lab INR appointment scheduled on 21.    SUBJECTIVE:  Patient Findings     Comments:  The patient was assessed for diet, medication, and activity level changes, missed or extra doses, bruising or bleeding, with no problem findings. Maintenance warfarin dosing was reviewed with patient and will remain on the same dose until next INR check. Patient did not have any questions or concerns.             Clinical Outcomes     Negatives:  Major bleeding event, Thromboembolic event, Anticoagulation-related hospital admission, Anticoagulation-related ED visit, Anticoagulation-related fatality    Comments:  The patient was assessed for diet, medication, and activity level changes, missed or extra doses, bruising or bleeding, with no problem findings. Maintenance warfarin dosing was reviewed with patient and will remain on the same dose until next INR check. Patient did not have any questions or concerns.                OBJECTIVE    Recent labs: (last 7 days)     20  1524   INR 2.40*       ASSESSMENT / PLAN  INR assessment THER    Recheck INR In: 6 WEEKS    INR Location Outside lab      Anticoagulation Summary  As of 2020    INR goal:  2.0-3.0   TTR:  80.4 % (1 y)   INR used for dosin.40 (2020)   Warfarin maintenance plan:  2.5 mg (5 mg x 0.5) every Sun, Fri; 5 mg (5 mg x 1) all other days   Full warfarin instructions:  2.5 mg every Sun, Fri; 5 mg all other days   Weekly warfarin total:  30 mg   No change documented:  Luna Oakley RN   Plan last modified:  Kari Lizarraga RN (10/28/2020)   Next INR check:  2021   Priority:  Maintenance   Target end date:  Indefinite    Indications    Long-term (current) use of anticoagulants [Z79.01] [Z79.01]  Heterozygous factor V Leiden  mutation (H) [D68.51]  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]  Personal history of venous thrombosis and embolism [Z86.718]             Anticoagulation Episode Summary     INR check location:      Preferred lab:      [facility]    Comments:  Takes in AM      Anticoagulation Care Providers     Provider Role Specialty Phone number    Du Almaraz MD Referring Internal Medicine [phone]            See the Encounter Report to view Anticoagulation Flowsheet and Dosing Calendar (Go to Encounters tab in chart review, and find the Anticoagulation Therapy Visit)    Dosage adjustment made based on physician directed care plan.    Luna Oakley RN

## 2021-01-04 ENCOUNTER — TELEPHONE (OUTPATIENT)
Dept: INTERNAL MEDICINE | Facility: CLINIC | Age: 66
End: 2021-01-04

## 2021-01-04 DIAGNOSIS — D68.51 HETEROZYGOUS FACTOR V LEIDEN MUTATION (H): ICD-10-CM

## 2021-01-04 RX ORDER — WARFARIN SODIUM 5 MG/1
TABLET ORAL
Qty: 90 TABLET | Refills: 0 | Status: SHIPPED | OUTPATIENT
Start: 2021-01-04 | End: 2021-01-07

## 2021-01-04 NOTE — TELEPHONE ENCOUNTER
Patient left VM asking for a call back.   No information left.     Please give patient a call back - thank you     Savanah Hays, RN, BSN, PHN

## 2021-01-04 NOTE — TELEPHONE ENCOUNTER
Called patient, he requested an Rx for Warfarin to be sent to [facility] as he only has a week supply remaining. Patient will call back with information for his mail order as he has both [facility] and [facility] wrote down to send Rx's for mail order.  Luna Oakley RN BSN    Warfarin 5 mg     Last Written Prescription Date:  1/14/2020  Last Fill Quantity: 90,   # refills: 1  Last Office Visit: 11/10/2020  Future Office visit:       Prescription approved per [facility] Refill Protocol.  Luna Oakley RN BSN

## 2021-01-06 DIAGNOSIS — E78.5 HYPERLIPIDEMIA LDL GOAL <130: ICD-10-CM

## 2021-01-06 DIAGNOSIS — K21.9 GASTROESOPHAGEAL REFLUX DISEASE WITHOUT ESOPHAGITIS: ICD-10-CM

## 2021-01-06 DIAGNOSIS — I10 ESSENTIAL HYPERTENSION: ICD-10-CM

## 2021-01-06 DIAGNOSIS — R73.03 PREDIABETES: ICD-10-CM

## 2021-01-06 DIAGNOSIS — D68.51 HETEROZYGOUS FACTOR V LEIDEN MUTATION (H): ICD-10-CM

## 2021-01-07 RX ORDER — FOLIC ACID 1 MG/1
1000 TABLET ORAL DAILY
Qty: 102 TABLET | Refills: 2 | Status: SHIPPED | OUTPATIENT
Start: 2021-01-07 | End: 2021-08-19

## 2021-01-07 RX ORDER — PRAVASTATIN SODIUM 40 MG
TABLET ORAL
Qty: 90 TABLET | Refills: 2 | Status: SHIPPED | OUTPATIENT
Start: 2021-01-07 | End: 2021-08-19

## 2021-01-07 RX ORDER — HYDROCHLOROTHIAZIDE 25 MG/1
25 TABLET ORAL DAILY
Qty: 90 TABLET | Refills: 2 | Status: SHIPPED | OUTPATIENT
Start: 2021-01-07 | End: 2021-08-19

## 2021-01-07 RX ORDER — WARFARIN SODIUM 5 MG/1
TABLET ORAL
Qty: 90 TABLET | Refills: 1 | Status: SHIPPED | OUTPATIENT
Start: 2021-01-07 | End: 2021-08-19

## 2021-01-07 NOTE — TELEPHONE ENCOUNTER
Routing refill request to provider for review/approval because:  Drug interaction warning    Warfarin to be filled by [facility]

## 2021-01-12 RX ORDER — LOSARTAN POTASSIUM 100 MG/1
100 TABLET ORAL DAILY
Qty: 90 TABLET | Refills: 2 | Status: SHIPPED | OUTPATIENT
Start: 2021-01-12 | End: 2021-08-19

## 2021-01-25 ENCOUNTER — ANTICOAGULATION THERAPY VISIT (OUTPATIENT)
Dept: ANTICOAGULATION | Facility: CLINIC | Age: 66
End: 2021-01-25

## 2021-01-25 DIAGNOSIS — D68.51 HETEROZYGOUS FACTOR V LEIDEN MUTATION (H): ICD-10-CM

## 2021-01-25 DIAGNOSIS — Z86.718 PERSONAL HISTORY OF VENOUS THROMBOSIS AND EMBOLISM: ICD-10-CM

## 2021-01-25 DIAGNOSIS — Z79.01 LONG TERM CURRENT USE OF ANTICOAGULANT THERAPY: ICD-10-CM

## 2021-01-25 DIAGNOSIS — D68.52 PROTHROMBIN GENE MUTATION (H): ICD-10-CM

## 2021-01-25 LAB
CAPILLARY BLOOD COLLECTION: NORMAL
INR PPP: 2.4 (ref 0.86–1.14)

## 2021-01-25 PROCEDURE — 99207 PR NO CHARGE NURSE ONLY: CPT

## 2021-01-25 PROCEDURE — 36416 COLLJ CAPILLARY BLOOD SPEC: CPT | Performed by: INTERNAL MEDICINE

## 2021-01-25 PROCEDURE — 85610 PROTHROMBIN TIME: CPT | Performed by: INTERNAL MEDICINE

## 2021-01-25 NOTE — PROGRESS NOTES
ANTICOAGULATION FOLLOW-UP CLINIC VISIT    Patient Name:  Alvin Ontiveros  Date:  2021  Contact Type:  Telephone/ Called patient, he denies any changes. Orders discussed with the patient, he agrees with the plan. Lab INR appointment scheduled on 3/8/21    SUBJECTIVE:  Patient Findings     Comments:  The patient was assessed for diet, medication, and activity level changes, missed or extra doses, bruising or bleeding, with no problem findings. Maintenance warfarin dosing was reviewed with patient and will remain on the same dose until next INR check. Patient did not have any questions or concerns.           Clinical Outcomes     Negatives:  Major bleeding event, Thromboembolic event, Anticoagulation-related hospital admission, Anticoagulation-related ED visit, Anticoagulation-related fatality    Comments:  The patient was assessed for diet, medication, and activity level changes, missed or extra doses, bruising or bleeding, with no problem findings. Maintenance warfarin dosing was reviewed with patient and will remain on the same dose until next INR check. Patient did not have any questions or concerns.              OBJECTIVE    Recent labs: (last 7 days)     21  1530   INR 2.40*       ASSESSMENT / PLAN  INR assessment THER    Recheck INR In: 6 WEEKS    INR Location Outside lab      Anticoagulation Summary  As of 2021    INR goal:  2.0-3.0   TTR:  80.5 % (1 y)   INR used for dosin.40 (2021)   Warfarin maintenance plan:  2.5 mg (5 mg x 0.5) every Sun, Fri; 5 mg (5 mg x 1) all other days   Full warfarin instructions:  2.5 mg every Sun, Fri; 5 mg all other days   Weekly warfarin total:  30 mg   No change documented:  Luna Oakley RN   Plan last modified:  Kari Lizarraga RN (10/28/2020)   Next INR check:  3/8/2021   Priority:  Maintenance   Target end date:  Indefinite    Indications    Long-term (current) use of anticoagulants [Z79.01] [Z79.01]  Heterozygous factor V Leiden mutation (H)  [D68.51]  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]  Personal history of venous thrombosis and embolism [Z86.718]             Anticoagulation Episode Summary     INR check location:      Preferred lab:      Send INR reminders to:  PAVEL MICHAEL [facility]    Comments:  Takes in AM      Anticoagulation Care Providers     Provider Role Specialty Phone number    Du Almaraz MD Referring Internal Medicine [phone]            See the Encounter Report to view Anticoagulation Flowsheet and Dosing Calendar (Go to Encounters tab in chart review, and find the Anticoagulation Therapy Visit)    Dosage adjustment made based on physician directed care plan.    Luna Oakley RN

## 2021-02-02 NOTE — PROGRESS NOTES
ANTICOAGULATION FOLLOW-UP CLINIC VISIT    Patient Name:  Alvin Ontiveros  Date:  2020  Contact Type:  Telephone/ Called patient, he denies any changes. Orders discussed with patient, he agrees with the plan. Lab INR appointment scheduled on 20.    SUBJECTIVE:  Patient Findings     Positives:   Change in diet/appetite (Patient report fewer green veggies, will resume usual diet. )    Comments:   The patient was assessed for diet, medication, and activity level changes, missed or extra doses, bruising or bleeding, with no problem findings. Maintenance warfarin dosing was reviewed with patient and will remain on the same dose until next INR check. Patient did not have any questions or concerns.             Clinical Outcomes     Comments:   The patient was assessed for diet, medication, and activity level changes, missed or extra doses, bruising or bleeding, with no problem findings. Maintenance warfarin dosing was reviewed with patient and will remain on the same dose until next INR check. Patient did not have any questions or concerns.                OBJECTIVE    Recent labs: (last 7 days)     20  1550   INR 2.90*       ASSESSMENT / PLAN  INR assessment THER    Recheck INR In: 6 WEEKS    INR Location Outside lab      Anticoagulation Summary  As of 2020    INR goal:   2.0-3.0   TTR:   93.5 % (1 y)   INR used for dosin.90 (2020)   Warfarin maintenance plan:   2.5 mg (5 mg x 0.5) every Sun; 5 mg (5 mg x 1) all other days   Full warfarin instructions:   2.5 mg every Sun; 5 mg all other days   Weekly warfarin total:   32.5 mg   No change documented:   Luna Oakley RN   Plan last modified:   Luna Oakley RN (2019)   Next INR check:   2020   Priority:   Maintenance   Target end date:       Indications    Long-term (current) use of anticoagulants [Z79.01] [Z79.01]  Heterozygous factor V Leiden mutation (H) [D68.51]  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  o Temperature: Fever ?100.0°F or ?37.8°C?  No  o Respiratory symptoms: New or worsening cough, shortness of breath, difficulty breathing, or sore throat? No  o GI symptoms: New onset of nausea, vomiting or diarrhea?  No  o Miscellaneous: New onset of loss of taste or smell, chills, repeated shaking with chills, muscle pain, headache, congestion or runny nose?  No  • Has the patient or a household member tested positive for COVID-19 in the last 14 days?  No  • Has the patient or a household member been tested for COVID-19 and are waiting for the results?  No           history of venous thrombosis and embolism [Z86.718]             Anticoagulation Episode Summary     INR check location:       Preferred lab:       Send INR reminders to:   PAVEL GUERRA    Comments:   Takes in AM      Anticoagulation Care Providers     Provider Role Specialty Phone number    Du Almaraz MD [facility] Internal Medicine [phone]            See the Encounter Report to view Anticoagulation Flowsheet and Dosing Calendar (Go to Encounters tab in chart review, and find the Anticoagulation Therapy Visit)    Dosage adjustment made based on physician directed care plan.    Luna Oakley RN

## 2021-03-08 ENCOUNTER — ANTICOAGULATION THERAPY VISIT (OUTPATIENT)
Dept: ANTICOAGULATION | Facility: CLINIC | Age: 66
End: 2021-03-08

## 2021-03-08 DIAGNOSIS — D68.52 PROTHROMBIN GENE MUTATION (H): ICD-10-CM

## 2021-03-08 DIAGNOSIS — Z79.01 LONG TERM CURRENT USE OF ANTICOAGULANT THERAPY: ICD-10-CM

## 2021-03-08 DIAGNOSIS — D68.51 HETEROZYGOUS FACTOR V LEIDEN MUTATION (H): ICD-10-CM

## 2021-03-08 DIAGNOSIS — Z86.718 PERSONAL HISTORY OF VENOUS THROMBOSIS AND EMBOLISM: ICD-10-CM

## 2021-03-08 LAB
CAPILLARY BLOOD COLLECTION: NORMAL
INR PPP: 2.4 (ref 0.86–1.14)

## 2021-03-08 PROCEDURE — 85610 PROTHROMBIN TIME: CPT | Performed by: INTERNAL MEDICINE

## 2021-03-08 PROCEDURE — 99207 PR NO CHARGE NURSE ONLY: CPT

## 2021-03-08 PROCEDURE — 36416 COLLJ CAPILLARY BLOOD SPEC: CPT | Performed by: INTERNAL MEDICINE

## 2021-03-08 NOTE — PROGRESS NOTES
ANTICOAGULATION FOLLOW-UP CLINIC VISIT    Patient Name:  Alvin Ontiveros  Date:  3/8/2021  Contact Type:  Telephone/ Called patient, he denies any changes. Orders discussed with the patient, he agrees with the plan. Lab INR appointment scheduled on 21.    SUBJECTIVE:  Patient Findings     Comments:  The patient was assessed for diet, medication, and activity level changes, missed or extra doses, bruising or bleeding, with no problem findings. Maintenance warfarin dosing was reviewed with patient and will remain on the same dose until next INR check. Patient did not have any questions or concerns.   Patient is scheduled for the [employer] vaccine 3/9/21.        Clinical Outcomes     Negatives:  Major bleeding event, Thromboembolic event, Anticoagulation-related hospital admission, Anticoagulation-related ED visit, Anticoagulation-related fatality    Comments:  The patient was assessed for diet, medication, and activity level changes, missed or extra doses, bruising or bleeding, with no problem findings. Maintenance warfarin dosing was reviewed with patient and will remain on the same dose until next INR check. Patient did not have any questions or concerns.   Patient is scheduled for the [employer] vaccine 3/9/21.           OBJECTIVE    Recent labs: (last 7 days)     21  1523   INR 2.40*       ASSESSMENT / PLAN  INR assessment THER    Recheck INR In: 6 WEEKS    INR Location Outside lab      Anticoagulation Summary  As of 3/8/2021    INR goal:  2.0-3.0   TTR:  80.5 % (1 y)   INR used for dosin.40 (3/8/2021)   Warfarin maintenance plan:  2.5 mg (5 mg x 0.5) every Sun, Fri; 5 mg (5 mg x 1) all other days   Full warfarin instructions:  2.5 mg every Sun, Fri; 5 mg all other days   Weekly warfarin total:  30 mg   No change documented:  Luna Oakley RN   Plan last modified:  Kari Lizarraga RN (10/28/2020)   Next INR check:  2021   Priority:  Maintenance   Target end date:  Indefinite     Indications    Long-term (current) use of anticoagulants [Z79.01] [Z79.01]  Heterozygous factor V Leiden mutation (H) [D68.51]  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]  Personal history of venous thrombosis and embolism [Z86.718]             Anticoagulation Episode Summary     INR check location:      Preferred lab:      Send INR reminders to:  PAVEL MICHAEL [facility]    Comments:  Takes in AM      Anticoagulation Care Providers     Provider Role Specialty Phone number    Du Almaraz MD Referring Internal Medicine [phone]            See the Encounter Report to view Anticoagulation Flowsheet and Dosing Calendar (Go to Encounters tab in chart review, and find the Anticoagulation Therapy Visit)    Dosage adjustment made based on physician directed care plan.    Luna Oakley RN

## 2021-03-09 ENCOUNTER — IMMUNIZATION (OUTPATIENT)
Dept: NURSING | Facility: CLINIC | Age: 66
End: 2021-03-09
Payer: COMMERCIAL

## 2021-03-09 PROCEDURE — 0031A PR COVID VAC JANSSEN AD26 0.5ML: CPT

## 2021-03-09 PROCEDURE — 91303 PR COVID VAC JANSSEN AD26 0.5ML: CPT

## 2021-04-02 NOTE — PROGRESS NOTES
ANTICOAGULATION FOLLOW-UP CLINIC VISIT    Patient Name:  Alvin Ontiveros  Date:  7/16/2019  Contact Type:  Face to Face    SUBJECTIVE:  Patient Findings     Positives:   Change in diet/appetite (Patient reports less green veggie intake, was in [place]. )    Comments:   The patient was assessed for medication and activity level changes, missed or extra doses, bruising or bleeding, with no problem findings.          Clinical Outcomes     Comments:   The patient was assessed for medication and activity level changes, missed or extra doses, bruising or bleeding, with no problem findings.             OBJECTIVE    INR Protime   Date Value Ref Range Status   07/16/2019 3.1 (A) 0.86 - 1.14 Final       ASSESSMENT / PLAN  INR assessment SUPRA    Recheck INR In: 4 WEEKS    INR Location Clinic      Anticoagulation Summary  As of 7/16/2019    INR goal:   2.0-3.0   TTR:   74.9 % (3.2 y)   INR used for dosing:   3.1! 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]  Personal history of venous thrombosis and embolism [Z86.718]             Anticoagulation Episode Summary     INR check location:       Preferred lab:       Send INR reminders to:   [facility]    Comments:   Takes in AM      Anticoagulation Care Providers     Provider Role Specialty Phone number    Du Almaraz MD [facility] Internal Medicine [phone]            See the Encounter Report to view Anticoagulation Flowsheet and Dosing  Calendar (Go to Encounters tab in chart review, and find the Anticoagulation Therapy Visit)    Dosage adjustment made based on physician directed care plan.    Luna Oakley RN                  Patient

## 2021-04-08 DIAGNOSIS — Z86.718 PERSONAL HISTORY OF VENOUS THROMBOSIS AND EMBOLISM: Primary | ICD-10-CM

## 2021-04-19 ENCOUNTER — ANTICOAGULATION THERAPY VISIT (OUTPATIENT)
Dept: ANTICOAGULATION | Facility: CLINIC | Age: 66
End: 2021-04-19

## 2021-04-19 DIAGNOSIS — Z86.718 PERSONAL HISTORY OF VENOUS THROMBOSIS AND EMBOLISM: ICD-10-CM

## 2021-04-19 DIAGNOSIS — D68.51 HETEROZYGOUS FACTOR V LEIDEN MUTATION (H): ICD-10-CM

## 2021-04-19 DIAGNOSIS — Z79.01 LONG TERM CURRENT USE OF ANTICOAGULANT THERAPY: ICD-10-CM

## 2021-04-19 DIAGNOSIS — D68.52 PROTHROMBIN GENE MUTATION (H): ICD-10-CM

## 2021-04-19 LAB
CAPILLARY BLOOD COLLECTION: NORMAL
INR PPP: 2.1 (ref 0.86–1.14)

## 2021-04-19 PROCEDURE — 85610 PROTHROMBIN TIME: CPT | Performed by: INTERNAL MEDICINE

## 2021-04-19 PROCEDURE — 36416 COLLJ CAPILLARY BLOOD SPEC: CPT | Performed by: INTERNAL MEDICINE

## 2021-04-19 PROCEDURE — 99207 PR NO CHARGE NURSE ONLY: CPT

## 2021-04-19 NOTE — PROGRESS NOTES
ANTICOAGULATION FOLLOW-UP CLINIC VISIT    Patient Name:  Alvin Ontiveros  Date:  2021  Contact Type:  Telephone/ Called patient, he denies any changes. Orders discussed with the patient, he agrees with the plan. Lab INR appointment scheduled on 21    SUBJECTIVE:  Patient Findings     Comments:  The patient was assessed for diet, medication, and activity level changes, missed or extra doses, bruising or bleeding, with no problem findings. Maintenance warfarin dosing was reviewed with patient and will remain on the same dose until next INR check. Patient did not have any questions or concerns.           Clinical Outcomes     Negatives:  Major bleeding event, Thromboembolic event, Anticoagulation-related hospital admission, Anticoagulation-related ED visit, Anticoagulation-related fatality    Comments:  The patient was assessed for diet, medication, and activity level changes, missed or extra doses, bruising or bleeding, with no problem findings. Maintenance warfarin dosing was reviewed with patient and will remain on the same dose until next INR check. Patient did not have any questions or concerns.              OBJECTIVE    Recent labs: (last 7 days)     21  1524   INR 2.10*       ASSESSMENT / PLAN  INR assessment THER    Recheck INR In: 7 WEEKS    INR Location Outside lab      Anticoagulation Summary  As of 2021    INR goal:  2.0-3.0   TTR:  80.5 % (1 y)   INR used for dosin.10 (2021)   Warfarin maintenance plan:  2.5 mg (5 mg x 0.5) every Sun, Fri; 5 mg (5 mg x 1) all other days   Full warfarin instructions:  2.5 mg every Sun, Fri; 5 mg all other days   Weekly warfarin total:  30 mg   No change documented:  Luna Oakley RN   Plan last modified:  Kari Lizarraga RN (10/28/2020)   Next INR check:  2021   Priority:  Maintenance   Target end date:  Indefinite    Indications    Long-term (current) use of anticoagulants [Z79.01] [Z79.01]  Heterozygous factor V Leiden mutation (H)  [D68.51]  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]  Personal history of venous thrombosis and embolism [Z86.718]             Anticoagulation Episode Summary     INR check location:      Preferred lab:      Send INR reminders to:  [facility]    Comments:  Takes in AM      Anticoagulation Care Providers     Provider Role Specialty Phone number    Du Almaraz MD Referring Internal Medicine [phone]            See the Encounter Report to view Anticoagulation Flowsheet and Dosing Calendar (Go to Encounters tab in chart review, and find the Anticoagulation Therapy Visit)    Dosage adjustment made based on physician directed care plan.    Luna Oakley RN

## 2021-06-07 ENCOUNTER — ANTICOAGULATION THERAPY VISIT (OUTPATIENT)
Dept: ANTICOAGULATION | Facility: CLINIC | Age: 66
End: 2021-06-07

## 2021-06-07 DIAGNOSIS — Z86.718 PERSONAL HISTORY OF VENOUS THROMBOSIS AND EMBOLISM: ICD-10-CM

## 2021-06-07 DIAGNOSIS — Z79.01 LONG TERM CURRENT USE OF ANTICOAGULANT THERAPY: ICD-10-CM

## 2021-06-07 DIAGNOSIS — D68.51 HETEROZYGOUS FACTOR V LEIDEN MUTATION (H): ICD-10-CM

## 2021-06-07 DIAGNOSIS — D68.52 PROTHROMBIN GENE MUTATION (H): ICD-10-CM

## 2021-06-07 LAB
CAPILLARY BLOOD COLLECTION: NORMAL
INR PPP: 2.2 (ref 0.86–1.14)

## 2021-06-07 PROCEDURE — 85610 PROTHROMBIN TIME: CPT | Performed by: INTERNAL MEDICINE

## 2021-06-07 PROCEDURE — 99207 PR NO CHARGE NURSE ONLY: CPT

## 2021-06-07 PROCEDURE — 36416 COLLJ CAPILLARY BLOOD SPEC: CPT | Performed by: INTERNAL MEDICINE

## 2021-06-07 NOTE — PROGRESS NOTES
ANTICOAGULATION FOLLOW-UP CLINIC VISIT    Patient Name:  Alvin Ontiveros  Date:  2021  Contact Type:  Telephone/ Called patient, he denies any changes. Orders discussed with the patient, he agrees with the plan. Lab INR appointment scheduled on 21    SUBJECTIVE:  Patient Findings     Comments:  The patient was assessed for diet, medication, and activity level changes, missed or extra doses, bruising or bleeding, with no problem findings. Maintenance warfarin dosing was reviewed with patient and will remain on the same dose until next INR check. Patient did not have any questions or concerns.           Clinical Outcomes     Negatives:  Major bleeding event, Thromboembolic event, Anticoagulation-related hospital admission, Anticoagulation-related ED visit, Anticoagulation-related fatality    Comments:  The patient was assessed for diet, medication, and activity level changes, missed or extra doses, bruising or bleeding, with no problem findings. Maintenance warfarin dosing was reviewed with patient and will remain on the same dose until next INR check. Patient did not have any questions or concerns.              OBJECTIVE    Recent labs: (last 7 days)     21  1521   INR 2.20*       ASSESSMENT / PLAN  INR assessment THER    Recheck INR In: 6 WEEKS    INR Location Outside lab      Anticoagulation Summary  As of 2021    INR goal:  2.0-3.0   TTR:  80.5 % (1 y)   INR used for dosin.20 (2021)   Warfarin maintenance plan:  2.5 mg (5 mg x 0.5) every Sun, Fri; 5 mg (5 mg x 1) all other days   Full warfarin instructions:  2.5 mg every Sun, Fri; 5 mg all other days   Weekly warfarin total:  30 mg   No change documented:  Luna Oakley RN   Plan last modified:  Kari Lizarraga RN (10/28/2020)   Next INR check:  2021   Priority:  Maintenance   Target end date:  Indefinite    Indications    Long-term (current) use of anticoagulants [Z79.01] [Z79.01]  Heterozygous factor V Leiden mutation (H)  [D68.51]  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]  Personal history of venous thrombosis and embolism [Z86.718]             Anticoagulation Episode Summary     INR check location:      Preferred lab:      Send INR reminders to:  [facility]    Comments:  Takes in AM      Anticoagulation Care Providers     Provider Role Specialty Phone number    Du Almaraz MD Referring Internal Medicine [phone]            See the Encounter Report to view Anticoagulation Flowsheet and Dosing Calendar (Go to Encounters tab in chart review, and find the Anticoagulation Therapy Visit)    Dosage adjustment made based on physician directed care plan.    Luna Oakley RN

## 2021-07-13 ENCOUNTER — TELEPHONE (OUTPATIENT)
Dept: INTERNAL MEDICINE | Facility: CLINIC | Age: 66
End: 2021-07-13

## 2021-07-13 DIAGNOSIS — I10 ESSENTIAL HYPERTENSION: ICD-10-CM

## 2021-07-13 DIAGNOSIS — D68.52 PROTHROMBIN GENE MUTATION (H): ICD-10-CM

## 2021-07-13 DIAGNOSIS — Z12.5 SCREENING PSA (PROSTATE SPECIFIC ANTIGEN): ICD-10-CM

## 2021-07-13 DIAGNOSIS — R73.03 PREDIABETES: ICD-10-CM

## 2021-07-13 DIAGNOSIS — Z79.01 LONG TERM CURRENT USE OF ANTICOAGULANT THERAPY: ICD-10-CM

## 2021-07-13 DIAGNOSIS — E78.5 HYPERLIPIDEMIA LDL GOAL <130: Primary | ICD-10-CM

## 2021-07-13 DIAGNOSIS — Z79.899 MEDICATION MANAGEMENT: ICD-10-CM

## 2021-07-13 NOTE — TELEPHONE ENCOUNTER
Patient calling for fasting lab orders to be placed if appropriate.  Patient scheduled physical in November and would like labs one week before.    Once placed please call patient to schedule lab only    Milagro Shaffer

## 2021-07-15 DIAGNOSIS — Z79.01 LONG TERM CURRENT USE OF ANTICOAGULANT THERAPY: Primary | ICD-10-CM

## 2021-07-15 DIAGNOSIS — D68.51 HETEROZYGOUS FACTOR V LEIDEN MUTATION (H): ICD-10-CM

## 2021-07-15 DIAGNOSIS — Z86.718 PERSONAL HISTORY OF VENOUS THROMBOSIS AND EMBOLISM: ICD-10-CM

## 2021-07-19 ENCOUNTER — LAB (OUTPATIENT)
Dept: LAB | Facility: CLINIC | Age: 66
End: 2021-07-19
Payer: COMMERCIAL

## 2021-07-19 ENCOUNTER — ANTICOAGULATION THERAPY VISIT (OUTPATIENT)
Dept: ANTICOAGULATION | Facility: CLINIC | Age: 66
End: 2021-07-19

## 2021-07-19 DIAGNOSIS — D68.52 PROTHROMBIN GENE MUTATION (H): ICD-10-CM

## 2021-07-19 DIAGNOSIS — Z79.01 LONG TERM CURRENT USE OF ANTICOAGULANT THERAPY: Primary | ICD-10-CM

## 2021-07-19 DIAGNOSIS — Z86.718 PERSONAL HISTORY OF VENOUS THROMBOSIS AND EMBOLISM: ICD-10-CM

## 2021-07-19 DIAGNOSIS — Z79.01 LONG TERM CURRENT USE OF ANTICOAGULANT THERAPY: ICD-10-CM

## 2021-07-19 DIAGNOSIS — D68.51 HETEROZYGOUS FACTOR V LEIDEN MUTATION (H): ICD-10-CM

## 2021-07-19 LAB — INR BLD: 2.7 (ref 0.9–1.1)

## 2021-07-19 PROCEDURE — 85610 PROTHROMBIN TIME: CPT

## 2021-07-19 PROCEDURE — 36416 COLLJ CAPILLARY BLOOD SPEC: CPT

## 2021-07-19 NOTE — PROGRESS NOTES
ANTICOAGULATION MANAGEMENT     Alvin Ontiveros 65 year old male is on warfarin with therapeutic INR result. (Goal INR 2.0-3.0)    Recent labs: (last 7 days)     07/19/21  1542   INR 2.7*       ASSESSMENT     Source(s): Patient/Caregiver Call       Warfarin doses taken: Warfarin taken as instructed    Diet: No new diet changes identified    New illness, injury, or hospitalization: No    Medication/supplement changes: None noted    Signs or symptoms of bleeding or clotting: No    Previous INR: Therapeutic last 2(+) visits    Additional findings: None     PLAN     Recommended plan for no diet, medication or health factor changes affecting INR     Dosing Instructions: Continue your current warfarin dose with next INR in 6 weeks       Summary  As of 7/19/2021    Full warfarin instructions:  2.5 mg every Sun, Fri; 5 mg all other days   Next INR check:  8/30/2021             Telephone call with Alvin who verbalizes understanding and agrees to plan    Lab visit scheduled    Education provided: Please call back if any changes to your diet, medications or how you've been taking warfarin and Contact [phone]  with any changes, questions or concerns.     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

## 2021-08-18 DIAGNOSIS — D68.51 HETEROZYGOUS FACTOR V LEIDEN MUTATION (H): ICD-10-CM

## 2021-08-18 DIAGNOSIS — R73.03 PREDIABETES: ICD-10-CM

## 2021-08-18 DIAGNOSIS — I10 ESSENTIAL HYPERTENSION: ICD-10-CM

## 2021-08-18 DIAGNOSIS — K21.9 GASTROESOPHAGEAL REFLUX DISEASE WITHOUT ESOPHAGITIS: ICD-10-CM

## 2021-08-18 DIAGNOSIS — E78.5 HYPERLIPIDEMIA LDL GOAL <130: ICD-10-CM

## 2021-08-19 RX ORDER — PRAVASTATIN SODIUM 40 MG
TABLET ORAL
Qty: 90 TABLET | Refills: 0 | Status: SHIPPED | OUTPATIENT
Start: 2021-08-19 | End: 2021-11-12

## 2021-08-19 RX ORDER — WARFARIN SODIUM 5 MG/1
TABLET ORAL
Qty: 90 TABLET | Refills: 1 | Status: SHIPPED | OUTPATIENT
Start: 2021-08-19 | End: 2021-08-24

## 2021-08-19 RX ORDER — FOLIC ACID 1 MG/1
1000 TABLET ORAL DAILY
Qty: 102 TABLET | Refills: 0 | Status: SHIPPED | OUTPATIENT
Start: 2021-08-19 | End: 2021-11-12

## 2021-08-19 RX ORDER — LOSARTAN POTASSIUM 100 MG/1
100 TABLET ORAL DAILY
Qty: 90 TABLET | Refills: 0 | Status: SHIPPED | OUTPATIENT
Start: 2021-08-19 | End: 2021-11-12

## 2021-08-19 RX ORDER — HYDROCHLOROTHIAZIDE 25 MG/1
25 TABLET ORAL DAILY
Qty: 90 TABLET | Refills: 0 | Status: SHIPPED | OUTPATIENT
Start: 2021-08-19 | End: 2021-11-12

## 2021-08-19 NOTE — TELEPHONE ENCOUNTER
Losartan, folic acid, omeprazole, hydrochlorothiazide, pravastatin   Prescription approved per [facility] Refill Protocol.    Warfarin  Will route to INR nurse

## 2021-08-19 NOTE — TELEPHONE ENCOUNTER
"Requested Prescriptions   Pending Prescriptions Disp Refills     losartan (COZAAR) 100 MG tablet 90 tablet 2     Sig: Take 1 tablet (100 mg) by mouth daily       Angiotensin-II Receptors Passed - 8/18/2021  2:37 PM        Passed - Last blood pressure under 140/90 in past 12 months     BP Readings from Last 3 Encounters:   11/10/20 138/82   01/14/20 136/87   10/15/19 134/84                 Passed - Recent (12 mo) or future (30 days) visit within the authorizing provider's specialty     Patient has had an office visit with the authorizing provider or a provider within the authorizing providers department within the previous 12 mos or has a future within next 30 days. See \"Patient Info\" tab in inbasket, or \"Choose Columns\" in Meds & Orders section of the refill encounter.              Passed - Medication is active on med list        Passed - Patient is age 18 or older        Passed - Normal serum creatinine on file in past 12 months     Recent Labs   Lab Test 11/03/20  0724   CR 0.87       Ok to refill medication if creatinine is low          Passed - Normal serum potassium on file in past 12 months     Recent Labs   Lab Test 11/03/20  0724   POTASSIUM 4.1                       folic acid (FOLVITE) 1 MG tablet 102 tablet 2     Sig: Take 1 tablet (1,000 mcg) by mouth daily       Vitamin Supplements (Adult) Protocol Passed - 8/18/2021  2:37 PM        Passed - High dose Vitamin D not ordered        Passed - Recent (12 mo) or future (30 days) visit within the authorizing provider's specialty     Patient has had an office visit with the authorizing provider or a provider within the authorizing providers department within the previous 12 mos or has a future within next 30 days. See \"Patient Info\" tab in inbasket, or \"Choose Columns\" in Meds & Orders section of the refill encounter.              Passed - Medication is active on med list           hydrochlorothiazide (HYDRODIURIL) 25 MG tablet 90 tablet 2     Sig: Take 1 " "tablet (25 mg) by mouth daily       Diuretics (Including Combos) Protocol Passed - 8/18/2021  2:37 PM        Passed - Blood pressure under 140/90 in past 12 months     BP Readings from Last 3 Encounters:   11/10/20 138/82   01/14/20 136/87   10/15/19 134/84                 Passed - Recent (12 mo) or future (30 days) visit within the authorizing provider's specialty     Patient has had an office visit with the authorizing provider or a provider within the authorizing providers department within the previous 12 mos or has a future within next 30 days. See \"Patient Info\" tab in inbasket, or \"Choose Columns\" in Meds & Orders section of the refill encounter.              Passed - Medication is active on med list        Passed - Patient is age 18 or older        Passed - Normal serum creatinine on file in past 12 months     Recent Labs   Lab Test 11/03/20  0724   CR 0.87              Passed - Normal serum potassium on file in past 12 months     Recent Labs   Lab Test 11/03/20  0724   POTASSIUM 4.1                    Passed - Normal serum sodium on file in past 12 months     Recent Labs   Lab Test 11/03/20  0724                    omeprazole (PRILOSEC) 20 MG DR capsule 90 capsule 2     Sig: Take 1 capsule (20 mg) by mouth daily       PPI Protocol Passed - 8/18/2021  2:37 PM        Passed - Not on Clopidogrel (unless Pantoprazole ordered)        Passed - No diagnosis of osteoporosis on record        Passed - Recent (12 mo) or future (30 days) visit within the authorizing provider's specialty     Patient has had an office visit with the authorizing provider or a provider within the authorizing providers department within the previous 12 mos or has a future within next 30 days. See \"Patient Info\" tab in inbasket, or \"Choose Columns\" in Meds & Orders section of the refill encounter.              Passed - Medication is active on med list        Passed - Patient is age 18 or older           pravastatin (PRAVACHOL) 40 MG " "tablet 90 tablet 2     Sig: TAKE 1 TABLET BY MOUTH  DAILY AT BEDTIME       Statins Protocol Passed - 8/18/2021  2:37 PM        Passed - LDL on file in past 12 months     Recent Labs   Lab Test 11/03/20  0724   LDL 97             Passed - No abnormal creatine kinase in past 12 months     No lab results found.             Passed - Recent (12 mo) or future (30 days) visit within the authorizing provider's specialty     Patient has had an office visit with the authorizing provider or a provider within the authorizing providers department within the previous 12 mos or has a future within next 30 days. See \"Patient Info\" tab in inbasket, or \"Choose Columns\" in Meds & Orders section of the refill encounter.              Passed - Medication is active on med list        Passed - Patient is age 18 or older           warfarin ANTICOAGULANT (COUMADIN) 5 MG tablet 90 tablet 1     Sig: TAKE 1 TABLET BY MOUTH DAILY EXCEPT TAKE ONE-HALF (1/2) TABLET ON SUNDAY AND FRIDAY OR AS INSTRUCTED BASED ON INR RESULT       Vitamin K Antagonists Failed - 8/18/2021  2:37 PM        Failed - INR is within goal in the past 6 weeks     Confirm INR is within goal in the past 6 weeks.     Recent Labs   Lab Test 07/19/21  1542   INR 2.7*                       Passed - Recent (12 mo) or future (30 days) visit within the authorizing provider's specialty     Patient has had an office visit with the authorizing provider or a provider within the authorizing providers department within the previous 12 mos or has a future within next 30 days. See \"Patient Info\" tab in inbasket, or \"Choose Columns\" in Meds & Orders section of the refill encounter.              Passed - Medication is active on med list        Passed - Patient is 18 years of age or older             "

## 2021-08-21 DIAGNOSIS — D68.51 HETEROZYGOUS FACTOR V LEIDEN MUTATION (H): ICD-10-CM

## 2021-08-24 RX ORDER — WARFARIN SODIUM 5 MG/1
TABLET ORAL
Qty: 90 TABLET | Refills: 0 | Status: SHIPPED | OUTPATIENT
Start: 2021-08-24 | End: 2021-11-12

## 2021-08-24 NOTE — TELEPHONE ENCOUNTER
"Requested Prescriptions   Pending Prescriptions Disp Refills     warfarin ANTICOAGULANT (JANTOVEN ANTICOAGULANT) 5 MG tablet [Pharmacy Med Name: JANTOVEN TAB 5MG] 90 tablet 1     Sig: TAKE 1 TABLET DAILY EXCEPT TAKE 1/2 TABLET SUNDAY AND FRIDAY OR AS INSTRUCTED BASED ON INTERNATIONAL NORMALIZED RATIO RESULTS       Vitamin K Antagonists Failed - 8/21/2021  7:21 AM        Failed - INR is within goal in the past 6 weeks     Confirm INR is within goal in the past 6 weeks.     Recent Labs   Lab Test 07/19/21  1542   INR 2.7*                       Passed - Recent (12 mo) or future (30 days) visit within the authorizing provider's specialty     Patient has had an office visit with the authorizing provider or a provider within the authorizing providers department within the previous 12 mos or has a future within next 30 days. See \"Patient Info\" tab in inbasket, or \"Choose Columns\" in Meds & Orders section of the refill encounter.              Passed - Medication is active on med list        Passed - Patient is 18 years of age or older           Last office visit 11/10/20.  Warfarin dosing is managed by INR Clinic.  Prescription approved per [facility] Refill Protocol.    "

## 2021-08-30 ENCOUNTER — LAB (OUTPATIENT)
Dept: LAB | Facility: CLINIC | Age: 66
End: 2021-08-30
Payer: COMMERCIAL

## 2021-08-30 ENCOUNTER — ANTICOAGULATION THERAPY VISIT (OUTPATIENT)
Dept: ANTICOAGULATION | Facility: CLINIC | Age: 66
End: 2021-08-30

## 2021-08-30 DIAGNOSIS — D68.52 PROTHROMBIN GENE MUTATION (H): ICD-10-CM

## 2021-08-30 DIAGNOSIS — Z79.01 LONG TERM CURRENT USE OF ANTICOAGULANT THERAPY: ICD-10-CM

## 2021-08-30 DIAGNOSIS — D68.51 HETEROZYGOUS FACTOR V LEIDEN MUTATION (H): ICD-10-CM

## 2021-08-30 DIAGNOSIS — Z79.01 LONG TERM CURRENT USE OF ANTICOAGULANT THERAPY: Primary | ICD-10-CM

## 2021-08-30 DIAGNOSIS — Z86.718 PERSONAL HISTORY OF VENOUS THROMBOSIS AND EMBOLISM: ICD-10-CM

## 2021-08-30 LAB — INR BLD: 1.9 (ref 0.9–1.1)

## 2021-08-30 PROCEDURE — 36416 COLLJ CAPILLARY BLOOD SPEC: CPT

## 2021-08-30 PROCEDURE — 85610 PROTHROMBIN TIME: CPT

## 2021-08-30 NOTE — PROGRESS NOTES
ANTICOAGULATION MANAGEMENT     Alvin Ontiveros 65 year old male is on warfarin with subtherapeutic INR result. (Goal INR 2.0-3.0)    Recent labs: (last 7 days)     08/30/21  1531   INR 1.9*       ASSESSMENT     Source(s): Chart Review and Patient/Caregiver Call       Warfarin doses taken: Warfarin taken as instructed    Diet: Increased greens/vitamin K in diet; plans to resume previous intake    New illness, injury, or hospitalization: Yes: possible abscess tooth, saw dentist this morning     Medication/supplement changes: None noted    Signs or symptoms of bleeding or clotting: No    Previous INR: Therapeutic last 2(+) visits    Additional findings: None     PLAN     Recommended plan for no diet, medication or health factor changes affecting INR     Dosing Instructions: Continue your current warfarin dose with next INR in 3 weeks       Summary  As of 8/30/2021    Full warfarin instructions:  2.5 mg every Sun, Fri; 5 mg all other days   Next INR check:  9/20/2021             Telephone call with Alvin who verbalizes understanding and agrees to plan    Lab visit scheduled    Education provided: Please call back if any changes to your diet, medications or how you've been taking warfarin and Contact [phone]  with any changes, questions or concerns.     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

## 2021-09-20 ENCOUNTER — LAB (OUTPATIENT)
Dept: LAB | Facility: CLINIC | Age: 66
End: 2021-09-20
Payer: COMMERCIAL

## 2021-09-20 ENCOUNTER — ANTICOAGULATION THERAPY VISIT (OUTPATIENT)
Dept: ANTICOAGULATION | Facility: CLINIC | Age: 66
End: 2021-09-20

## 2021-09-20 DIAGNOSIS — D68.51 HETEROZYGOUS FACTOR V LEIDEN MUTATION (H): ICD-10-CM

## 2021-09-20 DIAGNOSIS — Z79.01 LONG TERM CURRENT USE OF ANTICOAGULANT THERAPY: Primary | ICD-10-CM

## 2021-09-20 DIAGNOSIS — Z86.718 PERSONAL HISTORY OF VENOUS THROMBOSIS AND EMBOLISM: ICD-10-CM

## 2021-09-20 DIAGNOSIS — Z79.01 LONG TERM CURRENT USE OF ANTICOAGULANT THERAPY: ICD-10-CM

## 2021-09-20 DIAGNOSIS — D68.52 PROTHROMBIN GENE MUTATION (H): ICD-10-CM

## 2021-09-20 LAB — INR BLD: 2.7 (ref 0.9–1.1)

## 2021-09-20 PROCEDURE — 36416 COLLJ CAPILLARY BLOOD SPEC: CPT

## 2021-09-20 PROCEDURE — 85610 PROTHROMBIN TIME: CPT

## 2021-09-20 NOTE — PROGRESS NOTES
ANTICOAGULATION MANAGEMENT     Alvin Ontiveros 66 year old male is on warfarin with therapeutic INR result. (Goal INR 2.0-3.0)    Recent labs: (last 7 days)     09/20/21  1533   INR 2.7*       ASSESSMENT     Source(s): Chart Review and Patient/Caregiver Call       Warfarin doses taken: Warfarin taken as instructed    Diet: No new diet changes identified    New illness, injury, or hospitalization: No    Medication/supplement changes: None noted    Signs or symptoms of bleeding or clotting: No    Previous INR: Subtherapeutic    Additional findings: None     PLAN     Recommended plan for no diet, medication or health factor changes affecting INR     Dosing Instructions: Continue your current warfarin dose with next INR in 4 weeks       Summary  As of 9/20/2021    Full warfarin instructions:  2.5 mg every Sun, Fri; 5 mg all other days   Next INR check:  10/18/2021             Telephone call with Alvin who verbalizes understanding and agrees to plan    Lab visit scheduled    Education provided: Please call back if any changes to your diet, medications or how you've been taking warfarin and Contact [phone]  with any changes, questions or concerns.     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

## 2021-10-18 ENCOUNTER — DOCUMENTATION ONLY (OUTPATIENT)
Dept: ANTICOAGULATION | Facility: CLINIC | Age: 66
End: 2021-10-18

## 2021-10-18 ENCOUNTER — ANTICOAGULATION THERAPY VISIT (OUTPATIENT)
Dept: ANTICOAGULATION | Facility: CLINIC | Age: 66
End: 2021-10-18

## 2021-10-18 ENCOUNTER — LAB (OUTPATIENT)
Dept: LAB | Facility: CLINIC | Age: 66
End: 2021-10-18
Payer: COMMERCIAL

## 2021-10-18 DIAGNOSIS — Z86.718 PERSONAL HISTORY OF VENOUS THROMBOSIS AND EMBOLISM: ICD-10-CM

## 2021-10-18 DIAGNOSIS — D68.51 HETEROZYGOUS FACTOR V LEIDEN MUTATION (H): ICD-10-CM

## 2021-10-18 DIAGNOSIS — Z79.01 LONG TERM CURRENT USE OF ANTICOAGULANT THERAPY: Primary | ICD-10-CM

## 2021-10-18 DIAGNOSIS — D68.52 PROTHROMBIN GENE MUTATION (H): ICD-10-CM

## 2021-10-18 DIAGNOSIS — Z79.01 LONG TERM CURRENT USE OF ANTICOAGULANT THERAPY: ICD-10-CM

## 2021-10-18 LAB — INR BLD: 3.3 (ref 0.9–1.1)

## 2021-10-18 PROCEDURE — 85610 PROTHROMBIN TIME: CPT

## 2021-10-18 PROCEDURE — 36416 COLLJ CAPILLARY BLOOD SPEC: CPT

## 2021-10-18 NOTE — PROGRESS NOTES
ANTICOAGULATION MANAGEMENT      Alvin Ontiveros due for annual renewal of referral to anticoagulation monitoring. Order pended for your review and signature.      ANTICOAGULATION SUMMARY      Warfarin indication(s)     Factor V, Personal history of venous thrombosis and embolism     Heart valve present?  NO       Current goal range   INR: 2.0-3.0     Goal appropriate for indication? Yes, INR 2-3 appropriate for hx of DVT, PE, hypercoagulable state, Afib, LVAD, or bileaflet AVR without risk factors     Current duration of therapy Indefinite/long term therapy   Time in Therapeutic Range (TTR)  (Goal > 60%) 89.4%       Office visit with referring provider's group within last year yes on 11/10/2020, has appointment scheduled on 11/12/21         Luna Oakley RN

## 2021-11-02 ENCOUNTER — LAB (OUTPATIENT)
Dept: LAB | Facility: CLINIC | Age: 66
End: 2021-11-02
Payer: COMMERCIAL

## 2021-11-02 ENCOUNTER — ANTICOAGULATION THERAPY VISIT (OUTPATIENT)
Dept: ANTICOAGULATION | Facility: CLINIC | Age: 66
End: 2021-11-02

## 2021-11-02 DIAGNOSIS — Z86.718 PERSONAL HISTORY OF VENOUS THROMBOSIS AND EMBOLISM: ICD-10-CM

## 2021-11-02 DIAGNOSIS — R73.03 PREDIABETES: ICD-10-CM

## 2021-11-02 DIAGNOSIS — Z79.01 LONG TERM CURRENT USE OF ANTICOAGULANT THERAPY: Primary | ICD-10-CM

## 2021-11-02 DIAGNOSIS — Z79.01 LONG TERM CURRENT USE OF ANTICOAGULANT THERAPY: ICD-10-CM

## 2021-11-02 DIAGNOSIS — D68.52 PROTHROMBIN GENE MUTATION (H): ICD-10-CM

## 2021-11-02 DIAGNOSIS — Z12.5 SCREENING PSA (PROSTATE SPECIFIC ANTIGEN): ICD-10-CM

## 2021-11-02 DIAGNOSIS — E78.5 HYPERLIPIDEMIA LDL GOAL <130: ICD-10-CM

## 2021-11-02 DIAGNOSIS — D68.51 HETEROZYGOUS FACTOR V LEIDEN MUTATION (H): ICD-10-CM

## 2021-11-02 DIAGNOSIS — Z79.899 MEDICATION MANAGEMENT: ICD-10-CM

## 2021-11-02 LAB
ERYTHROCYTE [DISTWIDTH] IN BLOOD BY AUTOMATED COUNT: 13.2 % (ref 10–15)
HBA1C MFR BLD: 5.6 % (ref 0–5.6)
HCT VFR BLD AUTO: 43.8 % (ref 40–53)
HGB BLD-MCNC: 15.1 G/DL (ref 13.3–17.7)
INR BLD: 2.8 (ref 0.9–1.1)
MCH RBC QN AUTO: 31.4 PG (ref 26.5–33)
MCHC RBC AUTO-ENTMCNC: 34.5 G/DL (ref 31.5–36.5)
MCV RBC AUTO: 91 FL (ref 78–100)
PLATELET # BLD AUTO: 128 10E3/UL (ref 150–450)
RBC # BLD AUTO: 4.81 10E6/UL (ref 4.4–5.9)
WBC # BLD AUTO: 3.4 10E3/UL (ref 4–11)

## 2021-11-02 PROCEDURE — 85610 PROTHROMBIN TIME: CPT

## 2021-11-02 PROCEDURE — 83036 HEMOGLOBIN GLYCOSYLATED A1C: CPT

## 2021-11-02 PROCEDURE — 36415 COLL VENOUS BLD VENIPUNCTURE: CPT

## 2021-11-02 PROCEDURE — 80061 LIPID PANEL: CPT

## 2021-11-02 PROCEDURE — 85027 COMPLETE CBC AUTOMATED: CPT

## 2021-11-02 PROCEDURE — 80053 COMPREHEN METABOLIC PANEL: CPT

## 2021-11-02 PROCEDURE — 84443 ASSAY THYROID STIM HORMONE: CPT

## 2021-11-02 PROCEDURE — G0103 PSA SCREENING: HCPCS

## 2021-11-02 PROCEDURE — 84439 ASSAY OF FREE THYROXINE: CPT

## 2021-11-02 NOTE — PROGRESS NOTES
ANTICOAGULATION MANAGEMENT     Alvin Ontiveros 66 year old male is on warfarin with therapeutic INR result. (Goal INR 2.0-3.0)    Recent labs: (last 7 days)     11/02/21  0735   INR 2.8*       ASSESSMENT     Source(s): Chart Review and Patient/Caregiver Call       Warfarin doses taken: Warfarin taken as instructed    Diet: No new diet changes identified    New illness, injury, or hospitalization: No    Medication/supplement changes: None noted    Signs or symptoms of bleeding or clotting: No    Previous INR: Supratherapeutic    Additional findings: None     PLAN     Recommended plan for no diet, medication or health factor changes affecting INR     Dosing Instructions: Continue your current warfarin dose with next INR in 4 weeks       Summary  As of 11/2/2021    Full warfarin instructions:  2.5 mg every Sun, Fri; 5 mg all other days   Next INR check:  11/30/2021             Telephone call with Alvin who agrees to plan and repeated back plan correctly    Lab visit scheduled    Education provided: Please call back if any changes to your diet, medications or how you've been taking warfarin    Plan made per [facility] anticoagulation protocol    Kari Lizarraga RN  Anticoagulation Clinic  11/2/2021    _______________________________________________________________________     Anticoagulation Episode Summary     Current INR goal:  2.0-3.0   TTR:  91.0 % (1 y)   Target end date:  Indefinite   Send INR reminders to:  [facility]    Indications    Long-term (current) use of anticoagulants [Z79.01] [Z79.01]  Heterozygous factor V Leiden mutation (H) [D68.51]  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

## 2021-11-03 LAB
ALBUMIN SERPL-MCNC: 3.6 G/DL (ref 3.4–5)
ALP SERPL-CCNC: 52 U/L (ref 40–150)
ALT SERPL W P-5'-P-CCNC: 32 U/L (ref 0–70)
ANION GAP SERPL CALCULATED.3IONS-SCNC: 7 MMOL/L (ref 3–14)
AST SERPL W P-5'-P-CCNC: 20 U/L (ref 0–45)
BILIRUB SERPL-MCNC: 0.6 MG/DL (ref 0.2–1.3)
BUN SERPL-MCNC: 14 MG/DL (ref 7–30)
CALCIUM SERPL-MCNC: 10.4 MG/DL (ref 8.5–10.1)
CHLORIDE BLD-SCNC: 106 MMOL/L (ref 94–109)
CHOLEST SERPL-MCNC: 204 MG/DL
CO2 SERPL-SCNC: 28 MMOL/L (ref 20–32)
CREAT SERPL-MCNC: 0.86 MG/DL (ref 0.66–1.25)
FASTING STATUS PATIENT QL REPORTED: YES
GFR SERPL CREATININE-BSD FRML MDRD: 90 ML/MIN/1.73M2
GLUCOSE BLD-MCNC: 107 MG/DL (ref 70–99)
HDLC SERPL-MCNC: 86 MG/DL
LDLC SERPL CALC-MCNC: 84 MG/DL
NONHDLC SERPL-MCNC: 118 MG/DL
POTASSIUM BLD-SCNC: 4 MMOL/L (ref 3.4–5.3)
PROT SERPL-MCNC: 7.2 G/DL (ref 6.8–8.8)
PSA SERPL-MCNC: 1.07 UG/L (ref 0–4)
SODIUM SERPL-SCNC: 141 MMOL/L (ref 133–144)
T4 FREE SERPL-MCNC: 0.72 NG/DL (ref 0.76–1.46)
TRIGL SERPL-MCNC: 170 MG/DL
TSH SERPL DL<=0.005 MIU/L-ACNC: 5.39 MU/L (ref 0.4–4)

## 2021-11-12 ENCOUNTER — OFFICE VISIT (OUTPATIENT)
Dept: INTERNAL MEDICINE | Facility: CLINIC | Age: 66
End: 2021-11-12
Payer: COMMERCIAL

## 2021-11-12 VITALS
SYSTOLIC BLOOD PRESSURE: 136 MMHG | OXYGEN SATURATION: 98 % | WEIGHT: 229 LBS | HEIGHT: 72 IN | RESPIRATION RATE: 18 BRPM | BODY MASS INDEX: 31.02 KG/M2 | DIASTOLIC BLOOD PRESSURE: 84 MMHG | HEART RATE: 83 BPM | TEMPERATURE: 97.7 F

## 2021-11-12 DIAGNOSIS — Z00.00 ENCOUNTER FOR ANNUAL WELLNESS EXAM IN MEDICARE PATIENT: Primary | ICD-10-CM

## 2021-11-12 DIAGNOSIS — M79.672 LEFT FOOT PAIN: ICD-10-CM

## 2021-11-12 DIAGNOSIS — H91.93 BILATERAL HEARING LOSS, UNSPECIFIED HEARING LOSS TYPE: ICD-10-CM

## 2021-11-12 DIAGNOSIS — Z23 NEED FOR PROPHYLACTIC VACCINATION AND INOCULATION AGAINST INFLUENZA: ICD-10-CM

## 2021-11-12 DIAGNOSIS — D68.51 HETEROZYGOUS FACTOR V LEIDEN MUTATION (H): ICD-10-CM

## 2021-11-12 DIAGNOSIS — E78.5 HYPERLIPIDEMIA LDL GOAL <130: ICD-10-CM

## 2021-11-12 DIAGNOSIS — E03.4 HYPOTHYROIDISM DUE TO ACQUIRED ATROPHY OF THYROID: ICD-10-CM

## 2021-11-12 DIAGNOSIS — I10 ESSENTIAL HYPERTENSION: ICD-10-CM

## 2021-11-12 DIAGNOSIS — F10.21 ALCOHOL DEPENDENCE IN REMISSION (H): ICD-10-CM

## 2021-11-12 DIAGNOSIS — Z23 HIGH PRIORITY FOR 2019-NCOV VACCINE: ICD-10-CM

## 2021-11-12 DIAGNOSIS — R73.03 PREDIABETES: ICD-10-CM

## 2021-11-12 DIAGNOSIS — K21.9 GASTROESOPHAGEAL REFLUX DISEASE WITHOUT ESOPHAGITIS: ICD-10-CM

## 2021-11-12 DIAGNOSIS — N52.01 ERECTILE DYSFUNCTION DUE TO ARTERIAL INSUFFICIENCY: ICD-10-CM

## 2021-11-12 PROCEDURE — 91301 COVID-19,PF,MODERNA (18+ YRS PRIMARY SERIES .5ML): CPT | Performed by: INTERNAL MEDICINE

## 2021-11-12 PROCEDURE — 99214 OFFICE O/P EST MOD 30 MIN: CPT | Mod: 25 | Performed by: INTERNAL MEDICINE

## 2021-11-12 PROCEDURE — 0012A COVID-19,PF,MODERNA (18+ YRS PRIMARY SERIES .5ML): CPT | Performed by: INTERNAL MEDICINE

## 2021-11-12 PROCEDURE — 99397 PER PM REEVAL EST PAT 65+ YR: CPT | Mod: 25 | Performed by: INTERNAL MEDICINE

## 2021-11-12 PROCEDURE — 90662 IIV NO PRSV INCREASED AG IM: CPT | Performed by: INTERNAL MEDICINE

## 2021-11-12 PROCEDURE — 90471 IMMUNIZATION ADMIN: CPT | Performed by: INTERNAL MEDICINE

## 2021-11-12 RX ORDER — SILDENAFIL 100 MG/1
100 TABLET, FILM COATED ORAL DAILY PRN
Qty: 6 TABLET | Refills: 11 | Status: SHIPPED | OUTPATIENT
Start: 2021-11-12 | End: 2023-12-01

## 2021-11-12 RX ORDER — LOSARTAN POTASSIUM 100 MG/1
100 TABLET ORAL DAILY
Qty: 90 TABLET | Refills: 3 | Status: SHIPPED | OUTPATIENT
Start: 2021-11-12 | End: 2022-10-26

## 2021-11-12 RX ORDER — WARFARIN SODIUM 5 MG/1
TABLET ORAL
Qty: 90 TABLET | Refills: 3 | Status: SHIPPED | OUTPATIENT
Start: 2021-11-12 | End: 2022-11-15

## 2021-11-12 RX ORDER — PRAVASTATIN SODIUM 40 MG
TABLET ORAL
Qty: 90 TABLET | Refills: 3 | Status: SHIPPED | OUTPATIENT
Start: 2021-11-12 | End: 2022-11-22

## 2021-11-12 RX ORDER — LEVOTHYROXINE SODIUM 75 UG/1
75 TABLET ORAL DAILY
Qty: 90 TABLET | Refills: 0 | Status: SHIPPED | OUTPATIENT
Start: 2021-11-12 | End: 2022-02-23

## 2021-11-12 RX ORDER — FOLIC ACID 1 MG/1
1000 TABLET ORAL DAILY
Qty: 102 TABLET | Refills: 3 | Status: SHIPPED | OUTPATIENT
Start: 2021-11-12 | End: 2022-01-17

## 2021-11-12 RX ORDER — HYDROCHLOROTHIAZIDE 25 MG/1
25 TABLET ORAL DAILY
Qty: 90 TABLET | Refills: 3 | Status: SHIPPED | OUTPATIENT
Start: 2021-11-12 | End: 2022-11-22

## 2021-11-12 SDOH — HEALTH STABILITY: PHYSICAL HEALTH: ON AVERAGE, HOW MANY DAYS PER WEEK DO YOU ENGAGE IN MODERATE TO STRENUOUS EXERCISE (LIKE A BRISK WALK)?: 5 DAYS

## 2021-11-12 SDOH — HEALTH STABILITY: PHYSICAL HEALTH: ON AVERAGE, HOW MANY MINUTES DO YOU ENGAGE IN EXERCISE AT THIS LEVEL?: 40 MIN

## 2021-11-12 SDOH — ECONOMIC STABILITY: TRANSPORTATION INSECURITY
IN THE PAST 12 MONTHS, HAS THE LACK OF TRANSPORTATION KEPT YOU FROM MEDICAL APPOINTMENTS OR FROM GETTING MEDICATIONS?: NO

## 2021-11-12 SDOH — ECONOMIC STABILITY: INCOME INSECURITY: IN THE LAST 12 MONTHS, WAS THERE A TIME WHEN YOU WERE NOT ABLE TO PAY THE MORTGAGE OR RENT ON TIME?: NO

## 2021-11-12 SDOH — ECONOMIC STABILITY: FOOD INSECURITY: WITHIN THE PAST 12 MONTHS, YOU WORRIED THAT YOUR FOOD WOULD RUN OUT BEFORE YOU GOT MONEY TO BUY MORE.: NEVER TRUE

## 2021-11-12 SDOH — ECONOMIC STABILITY: TRANSPORTATION INSECURITY
IN THE PAST 12 MONTHS, HAS LACK OF TRANSPORTATION KEPT YOU FROM MEETINGS, WORK, OR FROM GETTING THINGS NEEDED FOR DAILY LIVING?: NO

## 2021-11-12 SDOH — ECONOMIC STABILITY: FOOD INSECURITY: WITHIN THE PAST 12 MONTHS, THE FOOD YOU BOUGHT JUST DIDN'T LAST AND YOU DIDN'T HAVE MONEY TO GET MORE.: NEVER TRUE

## 2021-11-12 ASSESSMENT — ENCOUNTER SYMPTOMS
DYSURIA: 0
FEVER: 0
HEMATURIA: 0
DIARRHEA: 0
ARTHRALGIAS: 0
HEARTBURN: 0
HEADACHES: 0
EYE PAIN: 0
JOINT SWELLING: 0
CONSTIPATION: 0
SHORTNESS OF BREATH: 0
CHILLS: 0
ABDOMINAL PAIN: 0
MYALGIAS: 0
DIZZINESS: 0
NERVOUS/ANXIOUS: 0
PARESTHESIAS: 0
WEAKNESS: 0
FREQUENCY: 0
COUGH: 0
NAUSEA: 0
PALPITATIONS: 0
HEMATOCHEZIA: 0
SORE THROAT: 0

## 2021-11-12 ASSESSMENT — LIFESTYLE VARIABLES
HOW OFTEN DO YOU HAVE A DRINK CONTAINING ALCOHOL: 2-3 TIMES A WEEK
HOW MANY STANDARD DRINKS CONTAINING ALCOHOL DO YOU HAVE ON A TYPICAL DAY: 1 OR 2
HOW OFTEN DO YOU HAVE SIX OR MORE DRINKS ON ONE OCCASION: NEVER
HOW OFTEN DO YOU HAVE SIX OR MORE DRINKS ON ONE OCCASION: NEVER
HOW MANY STANDARD DRINKS CONTAINING ALCOHOL DO YOU HAVE ON A TYPICAL DAY: 1 OR 2
HOW OFTEN DO YOU HAVE A DRINK CONTAINING ALCOHOL: 2-4 TIMES A MONTH

## 2021-11-12 ASSESSMENT — SOCIAL DETERMINANTS OF HEALTH (SDOH)
DO YOU BELONG TO ANY CLUBS OR ORGANIZATIONS SUCH AS CHURCH GROUPS UNIONS, FRATERNAL OR ATHLETIC GROUPS, OR SCHOOL GROUPS?: NO
WITHIN THE LAST YEAR, HAVE YOU BEEN KICKED, HIT, SLAPPED, OR OTHERWISE PHYSICALLY HURT BY YOUR PARTNER OR EX-PARTNER?: NO
WITHIN THE LAST YEAR, HAVE YOU BEEN HUMILIATED OR EMOTIONALLY ABUSED IN OTHER WAYS BY YOUR PARTNER OR EX-PARTNER?: NO
WITHIN THE LAST YEAR, HAVE TO BEEN RAPED OR FORCED TO HAVE ANY KIND OF SEXUAL ACTIVITY BY YOUR PARTNER OR EX-PARTNER?: NO
HOW HARD IS IT FOR YOU TO PAY FOR THE VERY BASICS LIKE FOOD, HOUSING, MEDICAL CARE, AND HEATING?: NOT HARD AT ALL
IN A TYPICAL WEEK, HOW MANY TIMES DO YOU TALK ON THE PHONE WITH FAMILY, FRIENDS, OR NEIGHBORS?: ONCE A WEEK
WITHIN THE LAST YEAR, HAVE YOU BEEN AFRAID OF YOUR PARTNER OR EX-PARTNER?: NO
HOW OFTEN DO YOU ATTEND CHURCH OR RELIGIOUS SERVICES?: 1 TO 4 TIMES PER YEAR
HOW OFTEN DO YOU GET TOGETHER WITH FRIENDS OR RELATIVES?: ONCE A WEEK

## 2021-11-12 ASSESSMENT — ACTIVITIES OF DAILY LIVING (ADL): CURRENT_FUNCTION: NO ASSISTANCE NEEDED

## 2021-11-12 ASSESSMENT — MIFFLIN-ST. JEOR: SCORE: 1858.74

## 2021-11-12 NOTE — PROGRESS NOTES
"SUBJECTIVE:   Alvin Ontiveros is a 66 year old male who presents for Preventive Visit.    [employer] form faxed. [facility]        Patient has been advised of split billing requirements and indicates understanding: Yes   Are you in the first 12 months of your Medicare coverage?  No    Healthy Habits:     In general, how would you rate your overall health?  Good    Frequency of exercise:  4-5 days/week    Duration of exercise:  30-45 minutes    Do you usually eat at least 4 servings of fruit and vegetables a day, include whole grains    & fiber and avoid regularly eating high fat or \"junk\" foods?  Yes    Taking medications regularly:  Yes    Medication side effects:  None    Ability to successfully perform activities of daily living:  No assistance needed    Home Safety:  Throw rugs in the hallway    Hearing Impairment:  Difficulty following a conversation in a noisy restaurant or crowded room, need to ask people to speak up or repeat themselves and difficulty understanding soft or whispered speech    In the past 6 months, have you been bothered by leaking of urine?  No    In general, how would you rate your overall mental or emotional health?  Excellent      PHQ-2 Total Score: 0    Additional concerns today:  Yes           Do you feel safe in your environment? Yes    Have you ever done Advance Care Planning? (For example, a Health Directive, POLST, or a discussion with a medical provider or your loved ones about your wishes): No, advance care planning information given to patient to review.  Patient plans to discuss their wishes with loved ones or provider.         Fall risk  Fallen 2 or more times in the past year?: No  Any fall with injury in the past year?: No    Cognitive Screening   1) Repeat 3 items (Leader, Season, Table)    2) Clock draw: NORMAL  3) 3 item recall: Recalls 3 objects  Results: 3 items recalled: COGNITIVE IMPAIRMENT LESS LIKELY    Mini-CogTM Copyright S Sharyn. Licensed by the author for use in " [facility]; reprinted with permission (ekta@[facility]). All rights reserved.      Do you have sleep apnea, excessive snoring or daytime drowsiness?: no    Reviewed and updated as needed this visit by clinical staff                Reviewed and updated as needed this visit by Provider               Social History     Tobacco Use     Smoking status: Former Smoker     Packs/day: 1.00     Years: 30.00     Pack years: 30.00     Types: Cigarettes     Quit date: 12/7/2005     Years since quitting: 15.9     Smokeless tobacco: Never Used   Substance Use Topics     Alcohol use: Yes     If you drink alcohol do you typically have >3 drinks per day or >7 drinks per week? No    Alcohol Use 11/10/2020   Prescreen: >3 drinks/day or >7 drinks/week? No   Prescreen: >3 drinks/day or >7 drinks/week? -       PROBLEMS TO ADD ON...In addition to an Annual Wellness Exam, we addressed prediabetes, hypertension, hyperlipidemia, GERD, hypothyroidism, left foot pain, erectile dysfunction.      The patient is tolerating his current medications without any adverse effects.     No symptoms of thyroid disturbance   Reflux symptoms well controlled on omeprazole.     BP appears well controlled on Losartan and hydrochlorothiazide.    We reviewed recent labs including lipid panel. Will refill pravastatin.   A1c remains in prediabetes range. Reviewed importance of maintaining weight and monitoring carbohydrate portions.   He has found sildenafil to be helpful for erectile dysfunction.      He has been bothered by left foot pain.        Current providers sharing in care for this patient include:   Patient Care Team:  Du Almaraz MD as PCP - General  Du Almaraz MD as Assigned PCP    The following health maintenance items are reviewed in Epic and correct as of today:  Health Maintenance Due   Topic Date Due     ANNUAL REVIEW OF  ORDERS  Never done     HEPATITIS C SCREENING  Never done     ZOSTER IMMUNIZATION (1 of 2) Never done      "AORTIC ANEURYSM SCREENING (SYSTEM ASSIGNED)  Never done     PHQ-2  01/01/2021     COVID-19 Vaccine (2 - Booster for Dayana series) 05/04/2021     INFLUENZA VACCINE (1) 09/01/2021     FALL RISK ASSESSMENT  11/10/2021     MEDICARE ANNUAL WELLNESS VISIT  11/10/2021     Pneumonia Vaccine: Had Pneumovax-23 a year ago.         Review of Systems   Constitutional: Negative for chills and fever.   HENT: Positive for hearing loss. Negative for congestion, ear pain and sore throat.    Eyes: Negative for pain and visual disturbance.   Respiratory: Negative for cough and shortness of breath.    Cardiovascular: Negative for chest pain, palpitations and peripheral edema.   Gastrointestinal: Negative for abdominal pain, constipation, diarrhea, heartburn, hematochezia and nausea.   Genitourinary: Negative for dysuria, frequency, genital sores, hematuria, impotence, penile discharge and urgency.   Musculoskeletal: Negative for arthralgias, joint swelling and myalgias.   Skin: Negative for rash.   Neurological: Negative for dizziness, weakness, headaches and paresthesias.   Psychiatric/Behavioral: Negative for mood changes. The patient is not nervous/anxious.        OBJECTIVE:   Vitals: /84   Pulse 83   Temp 97.7  F (36.5  C) (Tympanic)   Resp 18   Ht 1.84 m (6' 0.44\")   Wt 103.9 kg (229 lb)   SpO2 98%   BMI 30.68 kg/m    BMI= Body mass index is 30.68 kg/m .     Physical Exam  GENERAL: healthy, alert and no distress  EYES: Eyes grossly normal to inspection, PERRL and conjunctivae and sclerae normal  HENT: ear canals and TM's normal, nose and mouth without ulcers or lesions  NECK: no adenopathy, no asymmetry, masses, or scars and thyroid normal to palpation  RESP: lungs clear to auscultation - no rales, rhonchi or wheezes  CV: regular rate and rhythm, normal S1 S2, no S3 or S4, no murmur, click or rub, no peripheral edema and peripheral pulses strong  ABDOMEN: soft, nontender, no hepatosplenomegaly, no masses and bowel " sounds normal  MS: no gross musculoskeletal defects noted, no edema  SKIN: no suspicious lesions or rashes  NEURO: Normal strength and tone, mentation intact and speech normal  PSYCH: mentation appears normal, affect normal/bright    Diagnostic Test Results:  Labs reviewed in Epic    ASSESSMENT / PLAN:   (Z00.00) Encounter for annual wellness exam in Medicare patient  (primary encounter diagnosis)  Comment: Stable health. See epic orders.     (R73.03) Prediabetes  Comment: Watch carbohydrate portions.   Plan: folic acid (FOLVITE) 1 MG tablet, OFFICE/OUTPT         VISIT,EST,LEVL III          (I10) Essential hypertension  Comment: BP at target. Continue current meds.   Plan: hydrochlorothiazide (HYDRODIURIL) 25 MG tablet,        losartan (COZAAR) 100 MG tablet, OFFICE/OUTPT         VISIT,EST,LEVL III           (E78.5) Hyperlipidemia LDL goal <130  Comment: LDL at target. Continue current meds.   Plan: pravastatin (PRAVACHOL) 40 MG tablet,         OFFICE/OUTPT VISIT,EST,LEVL III          (K21.9) Gastroesophageal reflux disease without esophagitis  Comment: GERD symptoms controlled. Refill omeprazole.   Plan: omeprazole (PRILOSEC) 20 MG DR capsule,         OFFICE/OUTPT VISIT,EST,LEVL III          (E03.4) Hypothyroidism due to acquired atrophy of thyroid  Comment: Euthyroid clinically. Will treat with levothyroxine.   Plan: OFFICE/OUTPT VISIT,EST,LEVL III, levothyroxine         (SYNTHROID/LEVOTHROID) 75 MCG tablet          (M79.672) Left foot pain  Comment: Offered podiatry consult.   Plan: OFFICE/OUTPT VISIT,EST,LEVL III, Orthopedic          Referral          (N52.01) Erectile dysfunction due to arterial insufficiency  Comment: Refill Viagra.   Plan: sildenafil (VIAGRA) 100 MG tablet, OFFICE/OUTPT        VISIT,EST,LEVL III          (D68.51) Heterozygous Factor V Leiden Mutation  Plan: warfarin ANTICOAGULANT (JANTOVEN ANTICOAGULANT)        5 MG tablet          (Z23) Need for prophylactic vaccination and  "inoculation against influenza  Plan: ADMIN INFLUENZA (For MEDICARE Patients ONLY)         [], CANCELED: INFLUENZA, QUAD, HIGH DOSE,         PF, 65YR + (FLUZONE HD)          (H91.93) Bilateral hearing loss, unspecified hearing loss type  Plan: Otolaryngology Referral          (Z23) High priority for 2019-nCoV vaccine  Plan: COVID-19,PF,MODERNA (18+ Yrs Primary Series         .5mL)          (F10.21) Alcohol dependence in remission (H)  Comment: Continues in remission.       Patient has been advised of split billing requirements and indicates understanding: Yes  COUNSELING:  Reviewed preventive health counseling, as reflected in patient instructions    Estimated body mass index is 29.5 kg/m  as calculated from the following:    Height as of 11/10/20: 1.854 m (6' 1\").    Weight as of 11/10/20: 101.4 kg (223 lb 9.6 oz).        He reports that he quit smoking about 15 years ago. His smoking use included cigarettes. He has a 30.00 pack-year smoking history. He has never used smokeless tobacco.      Appropriate preventive services were discussed with this patient, including applicable screening as appropriate for cardiovascular disease, diabetes, osteopenia/osteoporosis, and glaucoma.  As appropriate for age/gender, discussed screening for colorectal cancer, prostate cancer, breast cancer, and cervical cancer. Checklist reviewing preventive services available has been given to the patient.    Reviewed patients plan of care and provided an AVS. The Basic Care Plan (routine screening as documented in Health Maintenance) for Alvin meets the Care Plan requirement. This Care Plan has been established and reviewed with the Patient.    Counseling Resources:  ATP IV Guidelines  Pooled Cohorts Equation Calculator  Breast Cancer Risk Calculator  Breast Cancer: Medication to Reduce Risk  FRAX Risk Assessment  ICSI Preventive Guidelines  Dietary Guidelines for Americans, 2010  USDA's MyPlate  ASA Prophylaxis  Lung CA " "Screening    Du Almaraz MD,   [facility]    Patient Instructions      Thyroid level a bit low--let's start a thyroid pill and check a \"lab only\" appointment after taking it for two months.     Contact information provided for Podiatry (left foot) and for ENT (hearing).     Everything looks fine!    Refills of medication have been faxed to your pharmacy. Written prescription provided for Viagra--shop around for a decent price.     Lab results look great with exception of just-noted low thyroid.    Lab only appointment in two months.   See me in a year, sooner if problems.      "

## 2021-11-12 NOTE — PATIENT INSTRUCTIONS
Patient Education   Personalized Prevention Plan  You are due for the preventive services outlined below.  Your care team is available to assist you in scheduling these services.  If you have already completed any of these items, please share that information with your care team to update in your medical record.  Health Maintenance Due   Topic Date Due     ANNUAL REVIEW OF HM ORDERS  Never done     Hepatitis C Screening  Never done     Zoster (Shingles) Vaccine (1 of 2) Never done     AORTIC ANEURYSM SCREENING (SYSTEM ASSIGNED)  Never done     PHQ-2  01/01/2021     COVID-19 Vaccine (2 - Booster for Dayana series) 05/04/2021     Flu Vaccine (1) 09/01/2021       Signs of Hearing Loss      Hearing much better with one ear can be a sign of hearing loss.   Hearing loss is a problem shared by many people. In fact, it is one of the most common health problems, particularly as people age. Most people age 65 and older have some hearing loss. By age 80, almost everyone does. Hearing loss often occurs slowly over the years. So you may not realize your hearing has gotten worse.  Have your hearing checked  Call your healthcare provider if you:    Have to strain to hear normal conversation    Have to watch other people s faces very carefully to follow what they re saying    Need to ask people to repeat what they ve said    Often misunderstand what people are saying    Turn the volume of the television or radio up so high that others complain    Feel that people are mumbling when they re talking to you    Find that the effort to hear leaves you feeling tired and irritated    Notice, when using the phone, that you hear better with one ear than the other  [employer] last reviewed this educational content on 1/1/2020 2000-2021 [employer]. All rights reserved. This information is not intended as a substitute for professional medical care. Always follow your healthcare professional's instructions.         Thyroid  "level a bit low--let's start a thyroid pill and check a \"lab only\" appointment after taking it for two months.     Contact information provided for Podiatry (left foot) and for ENT (hearing).     Everything looks fine!    Refills of medication have been faxed to your pharmacy. Written prescription provided for Viagra--shop around for a decent price.     Lab results look great with exception of just-noted low thyroid.    Lab only appointment in two months.   See me in a year, sooner if problems.     "

## 2021-11-22 ENCOUNTER — OFFICE VISIT (OUTPATIENT)
Dept: PODIATRY | Facility: CLINIC | Age: 66
End: 2021-11-22
Attending: INTERNAL MEDICINE
Payer: COMMERCIAL

## 2021-11-22 VITALS
HEIGHT: 72 IN | DIASTOLIC BLOOD PRESSURE: 74 MMHG | SYSTOLIC BLOOD PRESSURE: 118 MMHG | WEIGHT: 229.2 LBS | BODY MASS INDEX: 31.04 KG/M2

## 2021-11-22 DIAGNOSIS — M79.672 LEFT FOOT PAIN: ICD-10-CM

## 2021-11-22 PROCEDURE — 99243 OFF/OP CNSLTJ NEW/EST LOW 30: CPT | Performed by: PODIATRIST

## 2021-11-22 ASSESSMENT — MIFFLIN-ST. JEOR: SCORE: 1859.63

## 2021-11-22 NOTE — PROGRESS NOTES
"Foot & Ankle Surgery  November 22, 2021    CC: \" Pain left foot\"    I was asked to see Alvin Ontiveros regarding the chief complaint by:  Dr. KAIT Almaraz    HPI:  Pt is a 66 year old male who presents with above complaint.  4-month history of left foot pain.  He describes aching pain, 5 out of 10 \"on and off\".  \"It is in my toes\".  \"Very little back problems in my life\" but says \"I do have lower back problems\".  He describes symptoms in the left toes in the right toes, including dorsal greater than plantar symptoms.  \"I can live with it\".  \"I do not think it is getting worse\".  No treatments noted.    ROS:   Pos for CC.  The patient denies current nausea, vomiting, chills, fevers, belly pain, calf pain, chest pain or SOB.  Complete remainder of ROS is otherwise neg.    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     Personal history of venous thrombosis and embolism      Pneumonia, organism unspecified(486) 1/2006    Hospitalized 1/2006 with pleuritic chest pain     Pulmonary embolism (H) 2/2006     Thrombosis of leg 2005     TINY PULMONARY NODULES, PRESUMED BENIGN 9/27/2006     Tobacco use disorder     Abstracted 05/17/02       SXHX:    Past Surgical History:   Procedure Laterality Date     C UNLISTED PROCEDURE, ABDOMEN/PERITONEUM/OMENTUM      Description: Hernia Repair;  Recorded: 03/24/2008;     COLONOSCOPY  04/28/2016    One adenomatous polyp removed by Ariana Floyd, [facility], repeat in "      HC REMOVAL OF TONSILS,<11 Y/O      Tonsils <11y.o.     HC REMOVAL OF TONSILS,<11 Y/O      Description: Tonsillectomy;  Recorded: 2008;     HC REPAIR INCISIONAL HERNIA,REDUCIBLE  2004    Hernia Repair, Incisional, Unilateral, also umbilical hernia repair     REMOVE FOREIGN BODY HAND  2013    Procedure: REMOVE FOREIGN BODY HAND;  Left Thumb Foreign Body Removal ;  Surgeon: Blaine Willis MD;  Location:  OR     [facility] COLONOSCOPY THRU STOMA, DIAGNOSTIC  2006    Normal--repeat in 10 years.        MEDS:    Current Outpatient Medications   Medication     folic acid (FOLVITE) 1 MG tablet     hydrochlorothiazide (HYDRODIURIL) 25 MG tablet     levothyroxine (SYNTHROID/LEVOTHROID) 75 MCG tablet     losartan (COZAAR) 100 MG tablet     omeprazole (PRILOSEC) 20 MG DR capsule     pravastatin (PRAVACHOL) 40 MG tablet     sildenafil (VIAGRA) 100 MG tablet     warfarin ANTICOAGULANT (JANTOVEN ANTICOAGULANT) 5 MG tablet     Current Facility-Administered Medications   Medication     lidocaine 1 % injection 4 mL       ALL:     Allergies   Allergen Reactions     Ace Inhibitors        FMH:    Family History   Problem Relation Age of Onset     Heart Disease Father          age 86. MI at age 67, again at age 80.      Gastrointestinal Disease Father         PUD requiring surgery in the s.      Cerebrovascular Disease Brother         Born 6. Stroke at age 8 yrs old. Has reduced use of his right arm.      Family History Negative Mother         Born . Lives in assisted living.       SocHx:    Social History     Socioeconomic History     Marital status:      Spouse name: Maryam     Number of children: 5     Years of education: Not on file     Highest education level: Not on file   Occupational History     Occupation: Sales      Employer: [employer]   Tobacco Use     Smoking status: Former Smoker     Packs/day: 1.00     Years: 30.00     Pack years: 30.00     Types: Cigarettes     Quit date:  12/7/2005     Years since quitting: 15.9     Smokeless tobacco: Never Used   Vaping Use     Vaping Use: Never used   Substance and Sexual Activity     Alcohol use: Yes     Drug use: No     Sexual activity: Yes     Partners: Female   Other Topics Concern     Parent/sibling w/ CABG, MI or angioplasty before 65F 55M? Not Asked   Social History Narrative    , three biologic children, two stepchildren    All five children living in town.     Nine grandchildren.      Social Determinants of Health     Financial Resource Strain: Low Risk      Difficulty of Paying Living Expenses: Not hard at all   Food Insecurity: No Food Insecurity     Worried About Running Out of Food in the Last Year: Never true     Ran Out of Food in the Last Year: Never true   Transportation Needs: No Transportation Needs     Lack of Transportation (Medical): No     Lack of Transportation (Non-Medical): No   Physical Activity: Sufficiently Active     Days of Exercise per Week: 5 days     Minutes of Exercise per Session: 40 min   Stress: No Stress Concern Present     Feeling of Stress : Only a little   Social Connections: Moderately Isolated     Frequency of Communication with Friends and Family: Once a week     Frequency of Social Gatherings with Friends and Family: Once a week     Attends Jainism Services: 1 to 4 times per year     Active Member of Clubs or Organizations: No     Attends Club or Organization Meetings: Not on file     Marital Status:    Intimate Partner Violence: Not At Risk     Fear of Current or Ex-Partner: No     Emotionally Abused: No     Physically Abused: No     Sexually Abused: No   Housing Stability: Unknown     Unable to Pay for Housing in the Last Year: No     Number of Places Lived in the Last Year: Not on file     Unstable Housing in the Last Year: No           EXAMINATION:  Gen:   No apparent distress  Neuro:   A&Ox3, no deficits  Psych:    Answering questions appropriately for age and situation with normal  affect  Head:    NCAT  Eye:    Visual scanning without deficit  Ear:    Response to auditory stimuli wnl  Lung:    Non-labored breathing on RA noted  Abd:    NTND per patient report  Lymph:    Neg for pitting/non-pitting edema BLE  Vasc:    Pulses palpable, CFT minimally delayed  Neuro:    Light touch sensation intact to all sensory nerve distributions with paresthesias/numbness to the dorsal and plantar aspect of the toes bilateral left more pronounced than right.  I am not able to reproduce symptoms or elicit pain with palpation/percussion of the common peroneal nerve, the superficial peroneal nerve as it exits the deep fascia or the deep peroneal nerve.  The tibial nerve/branch exam, including palpation/percussion and TCST, are negative for reproduction of symptoms or pain  Derm:    Neg for nodules, lesions or ulcerations  MSK:    ROM, strength wnl without limitation, no pain on palpation noted.  Calf:    Neg for redness, swelling or tenderness    Assessment:  66 year old male with dorsal and plantar toe numbness bilateral left more pronounced than right without lower extremity evidence of nerve entrapment/neuritis      Plan:  Discussed etiologies, anatomy and options  1.  dorsal and plantar toe numbness bilateral left more pronounced than right without lower extremity evidence of nerve entrapment/neuritis  -I personally reviewed and interpreted the patient's lower extremity history pertinent to today's visit, including imaging/labs, in preparation for initiating a treatment program.  -We discussed 3 most common causes of lower extremity nerve symptoms are systemic disease (diabetes being most common), lumbosacral spine pathology and lower extremity nerve entrapment  -We discussed that his exam today is negative for evidence of lower extremity nerve entrapment  -He states he can live with this and this is not progressively worsening.  More aggressive options that were discussed include a lower back assessment  versus a neurology referral.  He states this is not bad enough to warrant further intervention.  I advise he call or MyChart if symptoms are worsening and this is becoming a more problematic issue, and we will proceed with lumbosacral spine assessment versus neurology referral    Follow up:  prn or sooner with acute issues      Patient's medical history was reviewed today      Gilberto Hills DPM [facility]  Podiatric Foot & Ankle Surgeon  [facility] [place]  [phone]    Disclaimer: This note consists of symbols derived from keyboarding, dictation and/or voice recognition software. As a result, there may be errors in the script that have gone undetected. Please consider this when interpreting information found in this chart.

## 2021-11-30 ENCOUNTER — ANTICOAGULATION THERAPY VISIT (OUTPATIENT)
Dept: ANTICOAGULATION | Facility: CLINIC | Age: 66
End: 2021-11-30

## 2021-11-30 ENCOUNTER — LAB (OUTPATIENT)
Dept: LAB | Facility: CLINIC | Age: 66
End: 2021-11-30
Payer: COMMERCIAL

## 2021-11-30 DIAGNOSIS — Z86.718 PERSONAL HISTORY OF VENOUS THROMBOSIS AND EMBOLISM: ICD-10-CM

## 2021-11-30 DIAGNOSIS — D68.51 HETEROZYGOUS FACTOR V LEIDEN MUTATION (H): ICD-10-CM

## 2021-11-30 DIAGNOSIS — Z79.01 LONG TERM CURRENT USE OF ANTICOAGULANT THERAPY: ICD-10-CM

## 2021-11-30 DIAGNOSIS — D68.52 PROTHROMBIN GENE MUTATION (H): ICD-10-CM

## 2021-11-30 DIAGNOSIS — Z79.01 LONG TERM CURRENT USE OF ANTICOAGULANT THERAPY: Primary | ICD-10-CM

## 2021-11-30 LAB — INR BLD: 2.6 (ref 0.9–1.1)

## 2021-11-30 PROCEDURE — 36416 COLLJ CAPILLARY BLOOD SPEC: CPT

## 2021-11-30 PROCEDURE — 85610 PROTHROMBIN TIME: CPT

## 2021-11-30 NOTE — PROGRESS NOTES
ANTICOAGULATION MANAGEMENT     Alvin Ontiveros 66 year old male is on warfarin with therapeutic INR result. (Goal INR 2.0-3.0)    Recent labs: (last 7 days)     11/30/21  1525   INR 2.6*       ASSESSMENT     Source(s): Chart Review and Patient/Caregiver Call       Warfarin doses taken: Warfarin taken as instructed    Diet: No new diet changes identified    New illness, injury, or hospitalization: No    Medication/supplement changes: Levothyroxine started 11/27/21, Concurrent use of ORAL ANTICOAGULANTS and THYROID HORMONES may result in an increased risk of bleeding.    Signs or symptoms of bleeding or clotting: No    Previous INR: Therapeutic last visit; previously outside of goal range    Additional findings: None     PLAN     Recommended plan for no diet, medication or health factor changes affecting INR     Dosing Instructions: Continue your current warfarin dose with next INR in 2 weeks       Summary  As of 11/30/2021    Full warfarin instructions:  2.5 mg every Sun, Fri; 5 mg all other days   Next INR check:  12/14/2021             Telephone call with Alvin who verbalizes understanding and agrees to plan    Lab visit scheduled    Education provided: Please call back if any changes to your diet, medications or how you've been taking warfarin and Contact [phone]  with any changes, questions or concerns.     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

## 2021-12-05 PROBLEM — F10.21 ALCOHOL DEPENDENCE IN REMISSION (H): Status: ACTIVE | Noted: 2021-12-05

## 2021-12-14 ENCOUNTER — LAB (OUTPATIENT)
Dept: LAB | Facility: CLINIC | Age: 66
End: 2021-12-14
Payer: COMMERCIAL

## 2021-12-14 ENCOUNTER — ANTICOAGULATION THERAPY VISIT (OUTPATIENT)
Dept: ANTICOAGULATION | Facility: CLINIC | Age: 66
End: 2021-12-14

## 2021-12-14 DIAGNOSIS — Z79.01 LONG TERM CURRENT USE OF ANTICOAGULANT THERAPY: ICD-10-CM

## 2021-12-14 DIAGNOSIS — D68.52 PROTHROMBIN GENE MUTATION (H): ICD-10-CM

## 2021-12-14 DIAGNOSIS — Z86.718 PERSONAL HISTORY OF VENOUS THROMBOSIS AND EMBOLISM: ICD-10-CM

## 2021-12-14 DIAGNOSIS — D68.51 HETEROZYGOUS FACTOR V LEIDEN MUTATION (H): ICD-10-CM

## 2021-12-14 DIAGNOSIS — Z79.01 LONG TERM CURRENT USE OF ANTICOAGULANT THERAPY: Primary | ICD-10-CM

## 2021-12-14 LAB — INR BLD: 2.7 (ref 0.9–1.1)

## 2021-12-14 PROCEDURE — 36416 COLLJ CAPILLARY BLOOD SPEC: CPT

## 2021-12-14 PROCEDURE — 85610 PROTHROMBIN TIME: CPT

## 2021-12-14 NOTE — PROGRESS NOTES
Left messages on home and cell phones to call [phone].     Anticoagulation Management    Unable to reach Alvin today.    Today's INR result of 2.7 is therapeutic (goal INR of 2.0-3.0).  Result received from: Clinic Lab    Follow up required to confirm warfarin dose taken and assess for changes    Left message to continue current dose of warfarin 5 mg tonight.      Anticoagulation clinic to follow up    Luna Oakley RN

## 2021-12-14 NOTE — PROGRESS NOTES
ANTICOAGULATION MANAGEMENT     Alvin Ontiveros 66 year old male is on warfarin with therapeutic INR result. (Goal INR 2.0-3.0)    Recent labs: (last 7 days)     12/14/21  1527   INR 2.7*       ASSESSMENT     Source(s): Chart Review and Patient/Caregiver Call       Warfarin doses taken: Warfarin taken as instructed    Diet: No new diet changes identified    New illness, injury, or hospitalization: No    Medication/supplement changes: None noted    Signs or symptoms of bleeding or clotting: No    Previous INR: Therapeutic last 2 visits    Additional findings: None     PLAN     Recommended plan for no diet, medication or health factor changes affecting INR     Dosing Instructions: Continue your current warfarin dose with next INR in 4 weeks       Summary  As of 12/14/2021    Full warfarin instructions:  2.5 mg every Sun, Fri; 5 mg all other days   Next INR check:  1/11/2022             Telephone call with Alvin who verbalizes understanding and agrees to plan    Lab visit scheduled    Education provided: Please call back if any changes to your diet, medications or how you've been taking warfarin and Contact [phone]  with any changes, questions or concerns.     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

## 2022-01-04 ENCOUNTER — NURSE TRIAGE (OUTPATIENT)
Dept: NURSING | Facility: CLINIC | Age: 67
End: 2022-01-04

## 2022-01-04 ENCOUNTER — OFFICE VISIT (OUTPATIENT)
Dept: FAMILY MEDICINE | Facility: CLINIC | Age: 67
End: 2022-01-04
Payer: COMMERCIAL

## 2022-01-04 VITALS
DIASTOLIC BLOOD PRESSURE: 84 MMHG | WEIGHT: 233.6 LBS | BODY MASS INDEX: 31.64 KG/M2 | RESPIRATION RATE: 16 BRPM | SYSTOLIC BLOOD PRESSURE: 144 MMHG | OXYGEN SATURATION: 98 % | TEMPERATURE: 97.6 F | HEIGHT: 72 IN | HEART RATE: 69 BPM

## 2022-01-04 DIAGNOSIS — F10.21 ALCOHOL DEPENDENCE IN REMISSION (H): ICD-10-CM

## 2022-01-04 DIAGNOSIS — S61.412A LACERATION OF LEFT HAND WITHOUT FOREIGN BODY, INITIAL ENCOUNTER: Primary | ICD-10-CM

## 2022-01-04 DIAGNOSIS — I26.99 PULMONARY EMBOLISM, UNSPECIFIED CHRONICITY, UNSPECIFIED PULMONARY EMBOLISM TYPE, UNSPECIFIED WHETHER ACUTE COR PULMONALE PRESENT (H): ICD-10-CM

## 2022-01-04 DIAGNOSIS — D68.51 HETEROZYGOUS FACTOR V LEIDEN MUTATION (H): ICD-10-CM

## 2022-01-04 PROBLEM — D68.59 PRIMARY HYPERCOAGULABLE STATE (H): Status: ACTIVE | Noted: 2022-01-04

## 2022-01-04 PROBLEM — K44.9 HIATAL HERNIA: Status: ACTIVE | Noted: 2022-01-04

## 2022-01-04 PROCEDURE — 99214 OFFICE O/P EST MOD 30 MIN: CPT | Performed by: INTERNAL MEDICINE

## 2022-01-04 RX ORDER — BACITRACIN ZINC 500 [USP'U]/G
OINTMENT TOPICAL 2 TIMES DAILY
Qty: 28.4 G | Refills: 0 | Status: SHIPPED | OUTPATIENT
Start: 2022-01-04 | End: 2022-11-22

## 2022-01-04 RX ORDER — CEPHALEXIN 500 MG/1
500 CAPSULE ORAL 2 TIMES DAILY
Qty: 14 CAPSULE | Refills: 0 | Status: SHIPPED | OUTPATIENT
Start: 2022-01-04 | End: 2022-02-22

## 2022-01-04 ASSESSMENT — MIFFLIN-ST. JEOR: SCORE: 1879.59

## 2022-01-04 ASSESSMENT — PAIN SCALES - GENERAL: PAINLEVEL: MILD PAIN (2)

## 2022-01-04 NOTE — PROGRESS NOTES
Assessment and Plan  1. Laceration of left hand without foreign body, initial encounter  New problem, given patient HPI and my physical exam positive for swelling and mild erythema of left hand and the risk factors , have done Dermabond and prophylactic antibiotic to make sure it doesn't worsen to cellulitis of the hand. Pt understood and agreed with the plan. After care instructions given.   - cephALEXin (KEFLEX) 500 MG capsule; Take 1 capsule (500 mg) by mouth 2 times daily  Dispense: 14 capsule; Refill: 0  - bacitracin 500 UNIT/GM external ointment; Apply topically 2 times daily  Dispense: 28.4 g; Refill: 0    2. Alcohol dependence in remission (H)  3. Heterozygous factor V Leiden mutation (H)  4. Pulmonary embolism, unspecified chronicity, unspecified pulmonary embolism type, unspecified whether acute cor pulmonale present (H)  Chronic stable conditions. Pt currently on Coumadin. No symptoms at this time.      Patient Instructions   As discussed, medications sent to your pharmacy.     Please follow the instructions as below -     AFTERCAREI  The wound will take about 1 to 2 weeks to heal, depending on the size. Healthy tissue will grow from the bottom and sides of the opening until it seals over.  Home care  These tips can help your wound heal:    The wound may drain for the first 2 days. Cover the wound with a clean dry dressing.     If you were prescribed antibiotics, take them as directed until they are all gone.    You may use acetaminophen control pain, unless another pain medicine was prescribed.       Follow-up care  Follow up with your healthcare provider, or as advised.Check your wound every day for any signs that the infection is getting worse. The signs are listed below.  When to seek medical advice  Call your healthcare provider right away if any of these occur:    Increasing redness or swelling    Red streaks in the skin leading away from the wound    Increasing local pain or swelling    Continued  pus draining from the wound 2 days after treatment    Fever of 100.4 F (38 C) or higher, or as directed by your healthcare provider    Boil returns when you are at home      Return in about 3 months (around 4/4/2022), or if symptoms worsen or fail to improve, for If symptoms persist, Follow up of last visit.    Briana Peters MD  [facility] MAIA Esquivel is a 66 year old who presents for the following health issues       HPI     Concern - Left Hand Laceration  Onset: 01/003/2022  Description: Cut hand with a knife last night around 730 PM         Allergies   Allergen Reactions     Ace Inhibitors      reaction unknown          Past Medical History:   Diagnosis Date     Chest pain, unspecified     Abstracted 05/17/02     Esophageal reflux     Abstracted 05/17/02     Gastro-oesophageal reflux disease      Hypertension      Other and unspecified alcohol dependence, unspecified drinking behavior     Abstracted 05/17/02     Other and unspecified coagulation defects 9/27/2006    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     Personal history of venous thrombosis and embolism      Pneumonia, organism unspecified(486) 1/2006    Hospitalized 1/2006 with pleuritic chest pain     Pulmonary embolism (H) 2/2006     Thrombosis of leg 2005     TINY PULMONARY NODULES, PRESUMED BENIGN 9/27/2006     Tobacco use disorder     Abstracted 05/17/02       Past Surgical History:   Procedure Laterality Date     C UNLISTED PROCEDURE, ABDOMEN/PERITONEUM/OMENTUM      Description: Hernia Repair;  Recorded: 03/24/2008;     COLONOSCOPY  04/28/2016    One adenomatous polyp removed by Ariana Floyd, [facility], repeat in 2021     HC REMOVAL OF TONSILS,<13 Y/O  1962    Tonsils <11y.o.     HC REMOVAL OF TONSILS,<13 Y/O      Description: Tonsillectomy;  Recorded: 03/24/2008;     HC REPAIR INCISIONAL HERNIA,REDUCIBLE  9/2004    Hernia Repair, Incisional,  "Unilateral, also umbilical hernia repair     REMOVE FOREIGN BODY HAND  2013    Procedure: REMOVE FOREIGN BODY HAND;  Left Thumb Foreign Body Removal ;  Surgeon: Blaine Willis MD;  Location: [facility] OR     [facility] COLONOSCOPY THRU STOMA, DIAGNOSTIC  2006    Normal--repeat in 10 years.       Family History   Problem Relation Age of Onset     Heart Disease Father          age 86. MI at age 67, again at age 80.      Gastrointestinal Disease Father         PUD requiring surgery in the 's.      Cerebrovascular Disease Brother         Born 1957. Stroke at age 8 yrs old. Has reduced use of his right arm.      Family History Negative Mother         Born . Lives in assisted living.       Social History     Tobacco Use     Smoking status: Former Smoker     Packs/day: 1.00     Years: 30.00     Pack years: 30.00     Types: Cigarettes     Quit date: 2005     Years since quittin.0     Smokeless tobacco: Never Used   Substance Use Topics     Alcohol use: Yes        Current Outpatient Medications   Medication     bacitracin 500 UNIT/GM external ointment     cephALEXin (KEFLEX) 500 MG capsule     folic acid (FOLVITE) 1 MG tablet     hydrochlorothiazide (HYDRODIURIL) 25 MG tablet     levothyroxine (SYNTHROID/LEVOTHROID) 75 MCG tablet     losartan (COZAAR) 100 MG tablet     omeprazole (PRILOSEC) 20 MG DR capsule     pravastatin (PRAVACHOL) 40 MG tablet     warfarin ANTICOAGULANT (JANTOVEN ANTICOAGULANT) 5 MG tablet     sildenafil (VIAGRA) 100 MG tablet     Current Facility-Administered Medications   Medication     lidocaine 1 % injection 4 mL        Review of Systems   Constitutional, HEENT, cardiovascular, pulmonary, GI, , musculoskeletal, neuro, skin, endocrine and psych systems are negative, except as otherwise noted.      Objective    BP (!) 144/84   Pulse 69   Temp 97.6  F (36.4  C) (Tympanic)   Resp 16   Ht 1.84 m (6' 0.44\")   Wt 106 kg (233 lb 9.6 oz)   SpO2 98%   BMI 31.30 kg/m    Body " mass index is 31.3 kg/m .  Physical Exam   GENERAL: healthy, alert and no distress  NECK: no adenopathy, no asymmetry, masses, or scars and thyroid normal to palpation  RESP: lungs clear to auscultation - no rales, rhonchi or wheezes  CV: regular rate and rhythm, normal S1 S2, no S3 or S4, no murmur, click or rub, no peripheral edema and peripheral pulses strong  ABDOMEN: soft, nontender, no hepatosplenomegaly, no masses and bowel sounds normal  MS: no gross musculoskeletal defects noted, no edema  LEFT HAND EXAM : POSITIVE for swelling and mild erythema of left hand, no bleeding , does have laceration on the medial aspect of the mid palm measuring 3 cm in length, no discharge are blood seen from cut injury.

## 2022-01-04 NOTE — TELEPHONE ENCOUNTER
Spouse Maryam is calling and states that ciara cut his hand on a butchers knife last night.   Today has an appointment for sutures.  Wife has questions today about laceration.  Denies fever cough and shortness of breath.      COVID 19 Nurse Triage Plan/Patient Instructions    Please be aware that novel coronavirus (COVID-19) may be circulating in the community. If you develop symptoms such as fever, cough, or SOB or if you have concerns about the presence of another infection including coronavirus (COVID-19), please contact your health care provider or visit [email or web address].     Disposition/Instructions    In-Person Visit with provider recommended. Reference Visit Selection Guide.    Thank you for taking steps to prevent the spread of this virus.  o Limit your contact with others.  o Wear a simple mask to cover your cough.  o Wash your hands well and often.    Resources    [facility]: About COVID-19: [email or web address]    CDC: What to Do If You're Sick: www.cdc.gov/coronavirus/2019-ncov/about/steps-when-sick.html    CDC: Ending Home Isolation: www.cdc.gov/coronavirus/2019-ncov/hcp/disposition-in-home-patients.html     CDC: Caring for Someone: www.cdc.gov/coronavirus/2019-ncov/if-you-are-sick/care-for-someone.html     [facility]: Interim Guidance for Hospital Discharge to Home: www.health.[facility].[place].us/diseases/coronavirus/hcp/hospdischarge.pdf    [facility] clinical trials (COVID-19 research studies): [email or web address]/umn-[email or web address]-trials     Below are the COVID-19 hotlines at the [place] Department of Health ([facility]). Interpreters are available.   o For health questions: Call 015-116-2216 or 1-454.536.3356 (7 a.m. to 7 p.m.)  o For questions about schools and childcare: Call 469-049-0771 or 1-342.350.2200 (7 a.m. to 7 p.m.)                       Reason for Disposition    [1] Looks infected (spreading redness, pus) AND [2] no fever    Additional Information    Negative: [1]  Major bleeding (e.g., actively dripping or spurting) AND [2] can't be stopped    Negative: Amputation    Negative: Shock suspected (e.g., cold/pale/clammy skin, too weak to stand, low BP, rapid pulse)    Negative: [1] Knife wound (or other possibly deep cut) AND [2] to chest, abdomen, back, neck, or head    Negative: [1] Cutter (self-mutilator) AND [2] suicidal or out-of-control    Negative: Sounds like a life-threatening emergency to the triager    Negative: [1] Bleeding AND [2] won't stop after 10 minutes of direct pressure (using correct technique)    Negative: Skin is split open or gaping (or length > 1/2 inch or 12 mm on the skin, 1/4 inch or 6 mm on the face)    Negative: [1] Deep cut AND [2] can see bone or tendons    Negative: Sensation of something in the wound (i.e., retained object in wound)    Negative: [1] Dirt in the wound AND [2] not removed with 15 minutes of scrubbing    Negative: Wound causes numbness (i.e., loss of sensation)    Negative: Wound causes weakness (i.e., decreased ability to move hand, finger, toe)    Negative: [1] SEVERE pain AND [2] not improved 2 hours after pain medicine    Negative: [1] Looks infected AND [2] large red area (>2 inches or 5 cm) or streak    Negative: [1] Fever AND [2] bright red area or streak    Negative: Suspicious history for the injury    Protocols used: CUTS AND [id number]

## 2022-01-04 NOTE — PATIENT INSTRUCTIONS
As discussed, medications sent to your pharmacy.     Please follow the instructions as below -     AFTERCAREI  The wound will take about 1 to 2 weeks to heal, depending on the size. Healthy tissue will grow from the bottom and sides of the opening until it seals over.  Home care  These tips can help your wound heal:    The wound may drain for the first 2 days. Cover the wound with a clean dry dressing.     If you were prescribed antibiotics, take them as directed until they are all gone.    You may use acetaminophen control pain, unless another pain medicine was prescribed.       Follow-up care  Follow up with your healthcare provider, or as advised.Check your wound every day for any signs that the infection is getting worse. The signs are listed below.  When to seek medical advice  Call your healthcare provider right away if any of these occur:    Increasing redness or swelling    Red streaks in the skin leading away from the wound    Increasing local pain or swelling    Continued pus draining from the wound 2 days after treatment    Fever of 100.4 F (38 C) or higher, or as directed by your healthcare provider    Boil returns when you are at home

## 2022-01-11 ENCOUNTER — LAB (OUTPATIENT)
Dept: LAB | Facility: CLINIC | Age: 67
End: 2022-01-11
Payer: COMMERCIAL

## 2022-01-11 ENCOUNTER — ANTICOAGULATION THERAPY VISIT (OUTPATIENT)
Dept: ANTICOAGULATION | Facility: CLINIC | Age: 67
End: 2022-01-11

## 2022-01-11 DIAGNOSIS — D68.52 PROTHROMBIN GENE MUTATION (H): ICD-10-CM

## 2022-01-11 DIAGNOSIS — Z79.01 LONG TERM CURRENT USE OF ANTICOAGULANT THERAPY: Primary | ICD-10-CM

## 2022-01-11 DIAGNOSIS — Z79.01 LONG TERM CURRENT USE OF ANTICOAGULANT THERAPY: ICD-10-CM

## 2022-01-11 DIAGNOSIS — Z86.718 PERSONAL HISTORY OF VENOUS THROMBOSIS AND EMBOLISM: ICD-10-CM

## 2022-01-11 DIAGNOSIS — D68.51 HETEROZYGOUS FACTOR V LEIDEN MUTATION (H): ICD-10-CM

## 2022-01-11 LAB — INR BLD: 2.4 (ref 0.9–1.1)

## 2022-01-11 PROCEDURE — 85610 PROTHROMBIN TIME: CPT

## 2022-01-11 PROCEDURE — 36416 COLLJ CAPILLARY BLOOD SPEC: CPT

## 2022-01-11 NOTE — PROGRESS NOTES
ANTICOAGULATION MANAGEMENT     Alvin Ontiveros 66 year old male is on warfarin with therapeutic INR result. (Goal INR 2.0-3.0)    Recent labs: (last 7 days)     01/11/22  1540   INR 2.4*       ASSESSMENT     Source(s): Chart Review and Patient/Caregiver Call       Warfarin doses taken: Warfarin taken as instructed    Diet: No new diet changes identified    New illness, injury, or hospitalization: Yes: Seen at the [facility] 1/4/22, cut hand with knife 1/3/22.    Medication/supplement changes: Keflex started on 1/4/22 may increase risk of bleeding, but not expected to affect INR, patient reports last dose of Keflex is 1/11/22. Per [employer]. Concurrent use of CEPHALEXIN and WARFARIN may result in an increased risk of bleeding.    Signs or symptoms of bleeding or clotting: No    Previous INR: Therapeutic last 2(+) visits    Additional findings: None     PLAN     Recommended plan for no diet, medication or health factor changes affecting INR     Dosing Instructions: Continue your current warfarin dose with next INR in 6 weeks       Summary  As of 1/11/2022    Full warfarin instructions:  2.5 mg every Sun, Fri; 5 mg all other days   Next INR check:  2/22/2022             Telephone call with Avlin who verbalizes understanding and agrees to plan    Lab visit scheduled    Education provided: Please call back if any changes to your diet, medications or how you've been taking warfarin and Contact [phone]  with any changes, questions or concerns.     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

## 2022-02-11 DIAGNOSIS — E03.4 HYPOTHYROIDISM DUE TO ACQUIRED ATROPHY OF THYROID: ICD-10-CM

## 2022-02-11 NOTE — TELEPHONE ENCOUNTER
Pending Prescriptions:                       Disp   Refills    SYNTHROID 75 MCG tablet [Pharmacy Med Name*90 tab*0        Sig: TAKE 1 TABLET DAILY    Routing refill request to provider for review/approval because:  Labs out of range:  TSH      Anabella CARBALLO RN   [facility]

## 2022-02-18 NOTE — TELEPHONE ENCOUNTER
Please advise pt:    Need to update thyroid labs on 2/22--order placed.   Will wait for those results before refilling.

## 2022-02-22 ENCOUNTER — LAB (OUTPATIENT)
Dept: LAB | Facility: CLINIC | Age: 67
End: 2022-02-22
Payer: COMMERCIAL

## 2022-02-22 ENCOUNTER — ANTICOAGULATION THERAPY VISIT (OUTPATIENT)
Dept: ANTICOAGULATION | Facility: CLINIC | Age: 67
End: 2022-02-22

## 2022-02-22 DIAGNOSIS — Z79.01 LONG TERM CURRENT USE OF ANTICOAGULANT THERAPY: Primary | ICD-10-CM

## 2022-02-22 DIAGNOSIS — D68.52 PROTHROMBIN GENE MUTATION (H): ICD-10-CM

## 2022-02-22 DIAGNOSIS — Z86.718 PERSONAL HISTORY OF VENOUS THROMBOSIS AND EMBOLISM: ICD-10-CM

## 2022-02-22 DIAGNOSIS — D68.51 HETEROZYGOUS FACTOR V LEIDEN MUTATION (H): ICD-10-CM

## 2022-02-22 DIAGNOSIS — Z79.01 LONG TERM CURRENT USE OF ANTICOAGULANT THERAPY: ICD-10-CM

## 2022-02-22 DIAGNOSIS — E03.4 HYPOTHYROIDISM DUE TO ACQUIRED ATROPHY OF THYROID: ICD-10-CM

## 2022-02-22 LAB — INR BLD: 2.3 (ref 0.9–1.1)

## 2022-02-22 PROCEDURE — 85610 PROTHROMBIN TIME: CPT

## 2022-02-22 PROCEDURE — 84439 ASSAY OF FREE THYROXINE: CPT

## 2022-02-22 PROCEDURE — 84443 ASSAY THYROID STIM HORMONE: CPT

## 2022-02-22 PROCEDURE — 36415 COLL VENOUS BLD VENIPUNCTURE: CPT

## 2022-02-22 NOTE — TELEPHONE ENCOUNTER
Patient had labs today.  Results are in process.  Please note results when they come through and refill medication as appropriate.  Thanks

## 2022-02-22 NOTE — PROGRESS NOTES
Anticoagulation Management    Unable to reach Parmjit today.    Today's INR result of 2.3 is therapeutic (goal INR of 2.0-3.0).  Result received from: Clinic Lab    Follow up required to confirm warfarin dose taken and assess for changes    Left VM to continue SAME DOSE and to call [phone] with transfer to Maryam Francisco:[phone] OR [facility]        Anticoagulation clinic to follow up    Maryam Boggs RN

## 2022-02-23 LAB
T4 FREE SERPL-MCNC: 0.9 NG/DL (ref 0.76–1.46)
TSH SERPL DL<=0.005 MIU/L-ACNC: 1.8 MU/L (ref 0.4–4)

## 2022-02-23 RX ORDER — LEVOTHYROXINE SODIUM 75 UG/1
75 TABLET ORAL DAILY
Qty: 14 TABLET | Refills: 0 | Status: SHIPPED | OUTPATIENT
Start: 2022-02-23 | End: 2022-03-08

## 2022-02-23 RX ORDER — LEVOTHYROXINE SODIUM 75 MCG
TABLET ORAL
Qty: 90 TABLET | Refills: 3 | Status: SHIPPED | OUTPATIENT
Start: 2022-02-23 | End: 2022-02-23

## 2022-02-23 NOTE — TELEPHONE ENCOUNTER
Called patient and relayed message.  Patient verbalized understanding.      Stated he ran out of medication last week and would like a small amount sent to local phamacy

## 2022-02-23 NOTE — PROGRESS NOTES
ANTICOAGULATION MANAGEMENT     Alvin Ontiveros 66 year old male is on warfarin with therapeutic INR result. (Goal INR 2.0-3.0)    Recent labs: (last 7 days)     02/22/22  1530   INR 2.3*       ASSESSMENT     Source(s): Chart Review and Patient/Caregiver Call       Warfarin doses taken: Warfarin taken as instructed    Diet: No new diet changes identified    New illness, injury, or hospitalization: No    Medication/supplement changes: None noted    Signs or symptoms of bleeding or clotting: No    Previous INR: Therapeutic last 2(+) visits    Additional findings: None     PLAN     Recommended plan for no diet, medication or health factor changes affecting INR     Dosing Instructions: Continue your current warfarin dose with next INR in 6 weeks       Summary  As of 2/22/2022    Full warfarin instructions:  2.5 mg every Sun, Fri; 5 mg all other days   Next INR check:  4/5/2022             Telephone call with Alvin who agrees to plan and repeated back plan correctly    Lab visit scheduled    Education provided: Please call back if any changes to your diet, medications or how you've been taking warfarin    Plan made per [facility] anticoagulation protocol    Kari Lizarraga RN  Anticoagulation Clinic  2/23/2022    _______________________________________________________________________     Anticoagulation Episode Summary     Current INR goal:  2.0-3.0   TTR:  91.6 % (1 y)   Target end date:  Indefinite   Send INR reminders to:  [facility]    Indications    Long-term (current) use of anticoagulants [Z79.01] [Z79.01]  Heterozygous factor V Leiden mutation (H) [D68.51]  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

## 2022-04-05 ENCOUNTER — ANTICOAGULATION THERAPY VISIT (OUTPATIENT)
Dept: ANTICOAGULATION | Facility: CLINIC | Age: 67
End: 2022-04-05

## 2022-04-05 ENCOUNTER — LAB (OUTPATIENT)
Dept: LAB | Facility: CLINIC | Age: 67
End: 2022-04-05
Payer: COMMERCIAL

## 2022-04-05 DIAGNOSIS — D68.51 HETEROZYGOUS FACTOR V LEIDEN MUTATION (H): ICD-10-CM

## 2022-04-05 DIAGNOSIS — Z79.01 LONG TERM CURRENT USE OF ANTICOAGULANT THERAPY: ICD-10-CM

## 2022-04-05 DIAGNOSIS — Z79.01 LONG TERM CURRENT USE OF ANTICOAGULANT THERAPY: Primary | ICD-10-CM

## 2022-04-05 DIAGNOSIS — Z86.718 PERSONAL HISTORY OF VENOUS THROMBOSIS AND EMBOLISM: ICD-10-CM

## 2022-04-05 DIAGNOSIS — D68.52 PROTHROMBIN GENE MUTATION (H): ICD-10-CM

## 2022-04-05 LAB — INR BLD: 2.7 (ref 0.9–1.1)

## 2022-04-05 PROCEDURE — 85610 PROTHROMBIN TIME: CPT

## 2022-04-05 PROCEDURE — 36416 COLLJ CAPILLARY BLOOD SPEC: CPT

## 2022-04-05 NOTE — PROGRESS NOTES
ANTICOAGULATION MANAGEMENT     Alvin Ontiveros 66 year old male is on warfarin with therapeutic INR result. (Goal INR 2.0-3.0)    Recent labs: (last 7 days)     04/05/22  1507   INR 2.7*       ASSESSMENT       Source(s): Chart Review and Patient/Caregiver Call       Warfarin doses taken: Warfarin taken as instructed    Diet: No new diet changes identified    New illness, injury, or hospitalization: No    Medication/supplement changes: Pt started Synthroid about 2 months ago and states he feels very well.    Signs or symptoms of bleeding or clotting: No    Previous INR: Therapeutic last 2(+) visits    Additional findings: None       PLAN     Recommended plan for no diet, medication or health factor changes affecting INR     Dosing Instructions: continue your current warfarin dose with next INR in 6 weeks       Summary  As of 4/5/2022    Full warfarin instructions:  2.5 mg every Sun, Fri; 5 mg all other days   Next INR check:  5/17/2022             Telephone call with Parmjit who verbalizes understanding and agrees to plan    Lab visit scheduled    Education provided: Contact [phone]  with any changes, questions or concerns.     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

## 2022-04-28 ENCOUNTER — OFFICE VISIT (OUTPATIENT)
Dept: INTERNAL MEDICINE | Facility: CLINIC | Age: 67
End: 2022-04-28
Payer: COMMERCIAL

## 2022-04-28 VITALS
BODY MASS INDEX: 30.79 KG/M2 | OXYGEN SATURATION: 96 % | RESPIRATION RATE: 16 BRPM | HEART RATE: 76 BPM | WEIGHT: 227.3 LBS | HEIGHT: 72 IN | SYSTOLIC BLOOD PRESSURE: 144 MMHG | TEMPERATURE: 97.9 F | DIASTOLIC BLOOD PRESSURE: 74 MMHG

## 2022-04-28 DIAGNOSIS — R05.9 COUGH: Primary | ICD-10-CM

## 2022-04-28 PROCEDURE — 99203 OFFICE O/P NEW LOW 30 MIN: CPT | Performed by: NURSE PRACTITIONER

## 2022-04-28 RX ORDER — BENZONATATE 100 MG/1
100 CAPSULE ORAL 3 TIMES DAILY PRN
Qty: 40 CAPSULE | Refills: 1 | Status: SHIPPED | OUTPATIENT
Start: 2022-04-28 | End: 2022-11-22

## 2022-04-28 NOTE — PROGRESS NOTES
Assessment & Plan     Cough    - benzonatate (TESSALON) 100 MG capsule; Take 1 capsule (100 mg) by mouth 3 times daily as needed for cough        Push fluids, OTC decongestants or antihistamines RIKI Saba NP  [facility] 66 year [facility] who presents for the following health issues     HPI     Acute Illness  Acute illness concerns: cough, cold  Onset/Duration: 1 week  Symptoms:  Fever: no  Chills/Sweats: no  Headache (location?): no  Sinus Pressure: no  Conjunctivitis:  no  Ear Pain: no  Rhinorrhea: YES  Congestion: YES  Sore Throat: no  Cough: YES-non-productive  Wheeze: no  Decreased Appetite: no  Nausea: no  Vomiting: no  Diarrhea: no  Dysuria/Freq.: no  Dysuria or Hematuria: no  Fatigue/Achiness: YES  Sick/Strep Exposure: no  Therapies tried and outcome: None      Review of Systems   CONSTITUTIONAL: NEGATIVE for fever, chills, change in weight  ENT/MOUTH: NEGATIVE for ear, mouth and throat problems  RESP:NEGATIVE for SOB/dyspnea and wheezing  CV: NEGATIVE for chest pain, palpitations or peripheral edema      Objective    BP (!) 144/74   Pulse 76   Temp 97.9  F (36.6  C) (Tympanic)   Resp 16   Ht 1.829 m (6')   Wt 103.1 kg (227 lb 4.8 oz)   SpO2 96%   BMI 30.83 kg/m      Physical Exam   GENERAL: healthy, alert and no distress  EYES: Eyes grossly normal to inspection, PERRL and conjunctivae and sclerae normal  HENT: ear canals and TM's normal, nose and mouth without ulcers or lesions  NECK: no adenopathy, no asymmetry, masses, or scars and thyroid normal to palpation  RESP: lungs clear to auscultation - no rales, rhonchi or wheezes  CV: regular rate and rhythm, normal S1 S2, no S3 or S4, no murmur, click or rub, no peripheral edema and peripheral pulses strong  PSYCH: mentation appears normal, affect normal/bright

## 2022-05-17 ENCOUNTER — ANTICOAGULATION THERAPY VISIT (OUTPATIENT)
Dept: ANTICOAGULATION | Facility: CLINIC | Age: 67
End: 2022-05-17

## 2022-05-17 ENCOUNTER — LAB (OUTPATIENT)
Dept: LAB | Facility: CLINIC | Age: 67
End: 2022-05-17
Payer: COMMERCIAL

## 2022-05-17 DIAGNOSIS — D68.51 HETEROZYGOUS FACTOR V LEIDEN MUTATION (H): ICD-10-CM

## 2022-05-17 DIAGNOSIS — Z79.01 LONG TERM CURRENT USE OF ANTICOAGULANT THERAPY: ICD-10-CM

## 2022-05-17 DIAGNOSIS — Z86.718 PERSONAL HISTORY OF VENOUS THROMBOSIS AND EMBOLISM: ICD-10-CM

## 2022-05-17 DIAGNOSIS — D68.52 PROTHROMBIN GENE MUTATION (H): ICD-10-CM

## 2022-05-17 DIAGNOSIS — Z79.01 LONG TERM CURRENT USE OF ANTICOAGULANT THERAPY: Primary | ICD-10-CM

## 2022-05-17 LAB — INR BLD: 2.8 (ref 0.9–1.1)

## 2022-05-17 PROCEDURE — 85610 PROTHROMBIN TIME: CPT

## 2022-05-17 PROCEDURE — 36416 COLLJ CAPILLARY BLOOD SPEC: CPT

## 2022-05-17 NOTE — PROGRESS NOTES
ANTICOAGULATION MANAGEMENT     Alvin Ontiveros 66 year old male is on warfarin with therapeutic INR result. (Goal INR 2.0-3.0)    Recent labs: (last 7 days)     05/17/22  1402   INR 2.8*       ASSESSMENT       Source(s): Chart Review and Patient/Caregiver Call       Warfarin doses taken: Warfarin taken as instructed    Diet: No new diet changes identified    New illness, injury, or hospitalization: Yes: 4/28/22 seen by provider for cold/cough, patient reports feeling better    Medication/supplement changes: None noted    Signs or symptoms of bleeding or clotting: No    Previous INR: Therapeutic last 2(+) visits    Additional findings: None       PLAN     Recommended plan for no diet, medication or health factor changes affecting INR     Dosing Instructions: continue your current warfarin dose with next INR in 6 weeks       Summary  As of 5/17/2022    Full warfarin instructions:  2.5 mg every Sun, Fri; 5 mg all other days   Next INR check:  6/28/2022             Telephone call with Alvin who verbalizes understanding and agrees to plan    Lab visit scheduled    Education provided: Please call back if any changes to your diet, medications or how you've been taking warfarin and Contact [phone]  with any changes, questions or concerns.     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

## 2022-06-28 ENCOUNTER — LAB (OUTPATIENT)
Dept: LAB | Facility: CLINIC | Age: 67
End: 2022-06-28
Payer: COMMERCIAL

## 2022-06-28 ENCOUNTER — ANTICOAGULATION THERAPY VISIT (OUTPATIENT)
Dept: ANTICOAGULATION | Facility: CLINIC | Age: 67
End: 2022-06-28

## 2022-06-28 DIAGNOSIS — D68.52 PROTHROMBIN GENE MUTATION (H): ICD-10-CM

## 2022-06-28 DIAGNOSIS — D68.51 HETEROZYGOUS FACTOR V LEIDEN MUTATION (H): ICD-10-CM

## 2022-06-28 DIAGNOSIS — Z79.01 LONG TERM CURRENT USE OF ANTICOAGULANT THERAPY: ICD-10-CM

## 2022-06-28 DIAGNOSIS — Z79.01 LONG TERM CURRENT USE OF ANTICOAGULANT THERAPY: Primary | ICD-10-CM

## 2022-06-28 DIAGNOSIS — Z86.718 PERSONAL HISTORY OF VENOUS THROMBOSIS AND EMBOLISM: ICD-10-CM

## 2022-06-28 LAB — INR BLD: 2.7 (ref 0.9–1.1)

## 2022-06-28 PROCEDURE — 85610 PROTHROMBIN TIME: CPT

## 2022-06-28 PROCEDURE — 36416 COLLJ CAPILLARY BLOOD SPEC: CPT

## 2022-06-28 NOTE — PROGRESS NOTES
ANTICOAGULATION MANAGEMENT     Alvin Ontiveros 66 year old male is on warfarin with therapeutic INR result. (Goal INR 2.0-3.0)    Recent labs: (last 7 days)     06/28/22  1511   INR 2.7*       ASSESSMENT       Source(s): Chart Review and Patient/Caregiver Call       Warfarin doses taken: Warfarin taken as instructed    Diet: No new diet changes identified    New illness, injury, or hospitalization: No    Medication/supplement changes: None noted    Signs or symptoms of bleeding or clotting: No    Previous INR: Therapeutic last 2(+) visits    Additional findings: None       PLAN     Recommended plan for no diet, medication or health factor changes affecting INR     Dosing Instructions: continue your current warfarin dose with next INR in 6 weeks       Summary  As of 6/28/2022    Full warfarin instructions:  2.5 mg every Sun, Fri; 5 mg all other days   Next INR check:  8/9/2022             Telephone call with Alvin who verbalizes understanding and agrees to plan    Lab visit scheduled    Education provided: Please call back if any changes to your diet, medications or how you've been taking warfarin and Contact [phone]  with any changes, questions or concerns.     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

## 2022-08-09 ENCOUNTER — LAB (OUTPATIENT)
Dept: LAB | Facility: CLINIC | Age: 67
End: 2022-08-09
Payer: COMMERCIAL

## 2022-08-09 ENCOUNTER — ANTICOAGULATION THERAPY VISIT (OUTPATIENT)
Dept: ANTICOAGULATION | Facility: CLINIC | Age: 67
End: 2022-08-09

## 2022-08-09 DIAGNOSIS — Z86.718 PERSONAL HISTORY OF VENOUS THROMBOSIS AND EMBOLISM: ICD-10-CM

## 2022-08-09 DIAGNOSIS — Z79.01 LONG TERM CURRENT USE OF ANTICOAGULANT THERAPY: Primary | ICD-10-CM

## 2022-08-09 DIAGNOSIS — D68.51 HETEROZYGOUS FACTOR V LEIDEN MUTATION (H): ICD-10-CM

## 2022-08-09 DIAGNOSIS — D68.52 PROTHROMBIN GENE MUTATION (H): ICD-10-CM

## 2022-08-09 DIAGNOSIS — Z79.01 LONG TERM CURRENT USE OF ANTICOAGULANT THERAPY: ICD-10-CM

## 2022-08-09 LAB — INR BLD: 2.3 (ref 0.9–1.1)

## 2022-08-09 PROCEDURE — 85610 PROTHROMBIN TIME: CPT

## 2022-08-09 PROCEDURE — 36416 COLLJ CAPILLARY BLOOD SPEC: CPT

## 2022-08-09 NOTE — PROGRESS NOTES
ANTICOAGULATION MANAGEMENT     Alvin Ontiveros 66 year old male is on warfarin with therapeutic INR result. (Goal INR 2.0-3.0)    Recent labs: (last 7 days)     08/09/22  1538   INR 2.3*       ASSESSMENT       Source(s): Chart Review and Patient/Caregiver Call       Warfarin doses taken: Warfarin taken as instructed    Diet: No new diet changes identified    New illness, injury, or hospitalization: No    Medication/supplement changes: None noted    Signs or symptoms of bleeding or clotting: No    Previous INR: Therapeutic last 2(+) visits    Additional findings: None       PLAN     Recommended plan for no diet, medication or health factor changes affecting INR     Dosing Instructions: Continue your current warfarin dose with next INR in 6 weeks       Summary  As of 8/9/2022    Full warfarin instructions:  2.5 mg every Sun, Fri; 5 mg all other days   Next INR check:  9/20/2022             Telephone call with Alvin who verbalizes understanding and agrees to plan    Lab visit scheduled    Education provided: Please call back if any changes to your diet, medications or how you've been taking warfarin and Contact [phone]  with any changes, questions or concerns.     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

## 2022-09-20 ENCOUNTER — ANTICOAGULATION THERAPY VISIT (OUTPATIENT)
Dept: ANTICOAGULATION | Facility: CLINIC | Age: 67
End: 2022-09-20

## 2022-09-20 ENCOUNTER — DOCUMENTATION ONLY (OUTPATIENT)
Dept: ANTICOAGULATION | Facility: CLINIC | Age: 67
End: 2022-09-20

## 2022-09-20 ENCOUNTER — LAB (OUTPATIENT)
Dept: LAB | Facility: CLINIC | Age: 67
End: 2022-09-20
Payer: COMMERCIAL

## 2022-09-20 DIAGNOSIS — Z86.718 PERSONAL HISTORY OF VENOUS THROMBOSIS AND EMBOLISM: ICD-10-CM

## 2022-09-20 DIAGNOSIS — D68.51 HETEROZYGOUS FACTOR V LEIDEN MUTATION (H): ICD-10-CM

## 2022-09-20 DIAGNOSIS — D68.52 PROTHROMBIN GENE MUTATION (H): ICD-10-CM

## 2022-09-20 DIAGNOSIS — Z79.01 LONG TERM CURRENT USE OF ANTICOAGULANT THERAPY: Primary | ICD-10-CM

## 2022-09-20 DIAGNOSIS — I26.99 PULMONARY EMBOLISM (H): ICD-10-CM

## 2022-09-20 DIAGNOSIS — Z79.01 LONG TERM CURRENT USE OF ANTICOAGULANT THERAPY: ICD-10-CM

## 2022-09-20 LAB — INR BLD: 2.6 (ref 0.9–1.1)

## 2022-09-20 PROCEDURE — 85610 PROTHROMBIN TIME: CPT

## 2022-09-20 PROCEDURE — 36416 COLLJ CAPILLARY BLOOD SPEC: CPT

## 2022-09-20 NOTE — PROGRESS NOTES
ANTICOAGULATION CLINIC REFERRAL RENEWAL REQUEST       An annual renewal order is required for all patients referred to [facility].?  Please review and sign the pended referral order for Alvin Ontiveros.       ANTICOAGULATION SUMMARY      Warfarin indication(s)   DVT and Heterozygous Factor V Leiden, hx of PE    Mechanical heart valve present?  NO       Current goal range   INR: 2.0-3.0     Goal appropriate for indication? Goal INR 2-3, standard for indication(s) above     Time in Therapeutic Range (TTR)  (Goal > 60%) 93.8%       Office visit with referring provider's group within last year yes on 11/22/21       Luna Oakley RN  [facility]

## 2022-09-20 NOTE — PROGRESS NOTES
ANTICOAGULATION MANAGEMENT     Alvin Ontiveros 67 year old male is on warfarin with therapeutic INR result. (Goal INR 2.0-3.0)    Recent labs: (last 7 days)     09/20/22  1450   INR 2.6*       ASSESSMENT       Source(s): Chart Review and Patient/Caregiver Call       Warfarin doses taken: Warfarin taken as instructed    Diet: No new diet changes identified    New illness, injury, or hospitalization: No    Medication/supplement changes: None noted    Signs or symptoms of bleeding or clotting: No    Previous INR: Therapeutic last 2(+) visits    Additional findings: Upcoming surgery/procedure received letter that colonoscopy is due, has not scheduled yet       PLAN     Recommended plan for no diet, medication or health factor changes affecting INR     Dosing Instructions: Continue your current warfarin dose with next INR in 6 weeks       Summary  As of 9/20/2022    Full warfarin instructions:  2.5 mg every Sun, Fri; 5 mg all other days   Next INR check:  11/1/2022             Telephone call with Alvin who verbalizes understanding and agrees to plan    Lab visit scheduled    Education provided: Please call back if any changes to your diet, medications or how you've been taking warfarin and Contact [phone]  with any changes, questions or concerns.     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

## 2022-10-07 ENCOUNTER — TELEPHONE (OUTPATIENT)
Dept: INTERNAL MEDICINE | Facility: CLINIC | Age: 67
End: 2022-10-07

## 2022-10-07 DIAGNOSIS — Z79.01 LONG TERM CURRENT USE OF ANTICOAGULANT THERAPY: Primary | ICD-10-CM

## 2022-10-07 DIAGNOSIS — D68.52 PROTHROMBIN GENE MUTATION (H): ICD-10-CM

## 2022-10-07 DIAGNOSIS — I10 ESSENTIAL HYPERTENSION: ICD-10-CM

## 2022-10-07 DIAGNOSIS — D68.51 HETEROZYGOUS FACTOR V LEIDEN MUTATION (H): ICD-10-CM

## 2022-10-07 DIAGNOSIS — Z86.718 PERSONAL HISTORY OF VENOUS THROMBOSIS AND EMBOLISM: ICD-10-CM

## 2022-10-07 NOTE — TELEPHONE ENCOUNTER
Spoke with patient. Colonoscopy scheduled for 11/2. Please advise hold plan. Patient currently has INR scheduled for 11/1 - informed him we will likely move this appointment to the following week after his procedure.    Elizabeth Oakley RN  [facility]tico[facility]  [phone]

## 2022-10-07 NOTE — TELEPHONE ENCOUNTER
Reason for Call:  Other call back    Detailed comments: Patient is having a colonoscopy on 11-2-22 and wants to know what he should do about his Wafarin    Phone Number Patient can be reached at: Cell number on file:    Telephone Information:   Mobile [phone]       Best Time: any    Can we leave a detailed message on this number? YES    Call taken on 10/7/2022 at 12:36 PM by Mary Hull

## 2022-10-07 NOTE — LETTER
"     [place] [address] [facility] [address]  Phone: [phone]   Alvin VASQUEZ [place]  [address] [facility] [address]      Procedure Instructions for Anticoagulation     For your upcoming colonoscopy on 11/2/22 your healthcare provider wants you to stop warfarin as directed below. To protect you from a clot while your off your warfarin, your provider wants you to inject a short-acting medication called enoxaparin (Lovenox), this is called \"bridging.\"      Enoxaparin (Lovenox) is an injection that you or a caregiver can give at home. It is injected just underneath the skin in your stomach. Your anticoagulation nurse can explain to you how to give the injection if you have not done so before. Additionally a video is available at [email or web address]-[email or web address]    Bring these instructions with you to your procedure to show the provider doing the procedure. Please discuss with the provider doing the procedure if it is okay to restart warfarin and enoxaparin as we have planned.      You will take enoxaparin 100 mg every 12 hours (one full syringe) as outlined below. A prescription was sent to [facility] pharmacy in [place].    10/27/22, Take last dose of warfarin  10/28/22, NO warfarin  10/29/22, NO warfarin  10/30/22, NO warfarin, enoxaparin every 12 hours (AM and PM)  10/31/2222, NO warfarin, enoxaparin every 12 hours (AM and PM). , NO warfarin, enoxaparin 11/1/22 AM only (no enoxaparin 24 hours prior to surgery).  Lab appointment at 3:15 to check creatinine (kidney function test)    11/2/22, DAY OF PROCEDURE, NO enoxaparin. Restart warfarin 10mg in the evening, if okay with provider doing the procedure.    Make sure to ask the provider doing your procedure if it is okay to begin your warfarin and enoxaparin as planned     11/3/22, Restart enoxaparin every 12 hours (AM and PM), unless instructed otherwise by the physician, " and 5 mg warfarin in the evening.   11/4/22, Enoxaparin every 12 hours (AM and PM) and 2.5 mg warfarin in the evening.  11/5/22, Enoxaparin every 12 hours (AM and PM) and 5 mg warfarin in the evening.  11/6/22, Enoxaparin every 12 hours (AM and PM) and 2.5 mg warfarin in the evening.  11/7/22, Enoxaparin every 12 hours (AM and PM) and 5 mg warfarin in the evening.  11/8/22, Enoxaparin every 12 hours (AM and PM) and 5 mg warfarin in the evening.  11/9/22, Enoxaparin in the AM. Recheck INR at 3:15 in the lab. Anticoagulation clinic to call with further dosing instructions.     Please watch for symptoms of both bleeding and clotting while on warfarin and enoxaparin:      Call 911 or go to the emergency room if you are experiencing any of the following:     Coughing or throwing up blood, can't control bleeding from a cut or injury after putting pressure on it, have a sudden severe headache, have red or black stools, or have red or orange urine.    Chest pain or shortness of breath    Any symptoms of a stroke including: sudden numbness or weakness in your face, arm or leg; sudden confusion or trouble speaking, reading or understanding; sudden blurred or decreased vision; sudden trouble walking or moving a part of the body; sudden severe headache for no reason.      Call your care team if you have:     Bleeding gums, large bruises, pale skin.    Sudden pain, tenderness or swelling in your leg or arm    Please contact the Anticoagulation Clinic at [phone]  if you have any questions or concerns.     Kari Lizarraga RN

## 2022-10-19 NOTE — TELEPHONE ENCOUNTER
HUBERT-PROCEDURAL ANTICOAGULATION  MANAGEMENT    ASSESSMENT     Warfarin interruption plan for colonoscopy on 11/2/22.    Indication for Anticoagulation: DVT, PE ( 2006), Heterozygous Factor V Leiden and Prothrombin Gene Mutation (heterozygous) - details in media tab (6/9/2006)      Hubert-Procedure Risk stratification for thromboembolism: high (2022 Chest guidelines and 2018 American Society of Hematology guideline)    HIGH RISK: 2022 CHEST Perioperative Management guidelines suggests bridging patients at high risk for thromboembolism when interrupting warfarin for a elective surgery/procedure     RECOMMENDATION       Pre-Procedure:  o Check serum creatinine on 11/1 to validate enoxaparin dosing  o Hold warfarin for 5 days, until after procedure starting: Friday, October 28   o Enoxaparin (Lovenox) 100 mg subq Q 12 hrs (1 mg/kg Q 12 hrs for CrCl >= 60 ml/min and BMI <= 40 kg/m2)   - Start enoxaparin: Sunday, October 30 in AM  - Last dose of enoxaparin prior to procedure: Tuesday, November 1 in AM  (~24 hours prior to procedure)      Post-Procedure:  o Resume warfarin dose if okay with provider doing procedure on night of procedure, Wednesday, November 2 PM: take 10 mg x 1, then resume home dose  o Resume enoxaparin (Lovenox) ~ 24 hrs post procedure if no polypectomy OR 48-72 hours if polypectomy when okay with provider doing procedure. Continue until INR >= 2.0 x 1  o Recheck INR 5-7 days after resuming warfarin   ?     Plan routed to referring provider for approval  ?   Roxana Velazquez [facility]    SUBJECTIVE/OBJECTIVE     Alvin Ontiveros, a 67 year old male    Goal INR Range: 2.0-3.0     Patient bridged in past: Yes: last in 2019 for colonoscopy      Wt Readings from Last 3 Encounters:   04/28/22 103.1 kg (227 lb 4.8 oz)   01/04/22 106 kg (233 lb 9.6 oz)   11/22/21 104 kg (229 lb 3.2 oz)      Ideal body weight: 77.6 kg (171 lb 1.2 oz)  Adjusted ideal body weight: 87.8 kg (193 lb 9.1 oz)     Estimated body mass index is  30.83 kg/m  as calculated from the following:    Height as of 4/28/22: 1.829 m (6').    Weight as of 4/28/22: 103.1 kg (227 lb 4.8 oz).    Lab Results   Component Value Date    INR 2.6 (H) 09/20/2022    INR 2.3 (H) 08/09/2022    INR 2.7 (H) 06/28/2022     Lab Results   Component Value Date    HGB 15.1 11/02/2021    HGB 15.8 01/24/2013    HCT 43.8 11/02/2021    HCT 46.3 01/24/2013     11/02/2021     01/24/2013     Lab Results   Component Value Date    CR 0.86 11/02/2021    CR 0.87 11/03/2020    CR 0.89 01/07/2020     CrCl = 102 ml/min

## 2022-10-20 RX ORDER — ENOXAPARIN SODIUM 100 MG/ML
100 INJECTION SUBCUTANEOUS EVERY 12 HOURS
Qty: 28 ML | Refills: 1 | Status: SHIPPED | OUTPATIENT
Start: 2022-10-20 | End: 2022-11-22

## 2022-10-20 NOTE — TELEPHONE ENCOUNTER
Reviewed hold plan with patient.  He will have creatinine done on 11/1.  Will mail letter reviewing plan.  Rx sent.

## 2022-11-01 ENCOUNTER — LAB (OUTPATIENT)
Dept: LAB | Facility: CLINIC | Age: 67
End: 2022-11-01
Payer: COMMERCIAL

## 2022-11-01 DIAGNOSIS — I10 ESSENTIAL HYPERTENSION: ICD-10-CM

## 2022-11-01 DIAGNOSIS — D68.51 HETEROZYGOUS FACTOR V LEIDEN MUTATION (H): ICD-10-CM

## 2022-11-01 DIAGNOSIS — Z86.718 PERSONAL HISTORY OF VENOUS THROMBOSIS AND EMBOLISM: ICD-10-CM

## 2022-11-01 DIAGNOSIS — D68.52 PROTHROMBIN GENE MUTATION (H): ICD-10-CM

## 2022-11-01 DIAGNOSIS — Z79.01 LONG TERM CURRENT USE OF ANTICOAGULANT THERAPY: ICD-10-CM

## 2022-11-01 LAB — INR BLD: 1.1 (ref 0.9–1.1)

## 2022-11-01 PROCEDURE — 82565 ASSAY OF CREATININE: CPT

## 2022-11-01 PROCEDURE — 36415 COLL VENOUS BLD VENIPUNCTURE: CPT

## 2022-11-01 PROCEDURE — 85610 PROTHROMBIN TIME: CPT

## 2022-11-02 ENCOUNTER — ANTICOAGULATION THERAPY VISIT (OUTPATIENT)
Dept: ANTICOAGULATION | Facility: CLINIC | Age: 67
End: 2022-11-02

## 2022-11-02 ENCOUNTER — TELEPHONE (OUTPATIENT)
Dept: INTERNAL MEDICINE | Facility: CLINIC | Age: 67
End: 2022-11-02

## 2022-11-02 DIAGNOSIS — D68.51 HETEROZYGOUS FACTOR V LEIDEN MUTATION (H): ICD-10-CM

## 2022-11-02 DIAGNOSIS — Z86.718 PERSONAL HISTORY OF VENOUS THROMBOSIS AND EMBOLISM: ICD-10-CM

## 2022-11-02 DIAGNOSIS — Z79.01 LONG TERM CURRENT USE OF ANTICOAGULANT THERAPY: Primary | ICD-10-CM

## 2022-11-02 DIAGNOSIS — D68.52 PROTHROMBIN GENE MUTATION (H): ICD-10-CM

## 2022-11-02 LAB
CREAT SERPL-MCNC: 0.87 MG/DL (ref 0.66–1.25)
GFR SERPL CREATININE-BSD FRML MDRD: >90 ML/MIN/1.73M2

## 2022-11-02 NOTE — TELEPHONE ENCOUNTER
Called patient. Patient states provider doing colonoscopy advised him to take a dose Lovenox today, then 2x a day until INR is goal range. Provider advised patient to resume warfarin 11/3/22 taking 5 mg daily and have INR checked 11/7/22.  Updated warfarin dosing calendar.    Luna Oakley RN, BSN  Anticoagulation Clinic

## 2022-11-02 NOTE — PROGRESS NOTES
ANTICOAGULATION MANAGEMENT     Alvin Ontiveros 67 year old male is on warfarin with subtherapeutic INR result. (Goal INR 2.0-3.0)    Recent labs: (last 7 days)     11/01/22  1519   INR 1.1       ASSESSMENT       Source(s): Chart Review and Patient/Caregiver Call       Warfarin doses taken: Held for colonoscopy 11/2/22  recently which may be affecting INR    Diet: No new diet changes identified    New illness, injury, or hospitalization: No    Medication/supplement changes: None noted    Signs or symptoms of bleeding or clotting: No    Previous INR: Therapeutic last 2(+) visits    Additional findings: Upcoming surgery/procedure Colonoscopy 11/2/22       PLAN     Recommended plan for temporary change(s) affecting INR     Dosing Instructions: Hold dose 11/1/22, resume warfarin 11/2/22 with booster dose if ok with proivder doing procedure then  continue your current warfarin dose with next INR in 1 week       Summary  As of 11/2/2022    Full warfarin instructions:  11/2: 10 mg; Otherwise 2.5 mg every Sun, Fri; 5 mg all other days; Starting 11/2/2022   Next INR check:  11/9/2022             Telephone call with Alvin who verbalizes understanding and agrees to plan    Lab visit scheduled    Education provided:     Please call back if any changes to your diet, medications or how you've been taking warfarin    Contact [phone]  with any changes, questions or concerns.     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

## 2022-11-02 NOTE — TELEPHONE ENCOUNTER
Patient Returning Call    Reason for call:  Patient returning INR RN call     Information relayed to patient:      Patient has additional questions:      What are your questions/concerns:      Okay to leave a detailed message?: Yes at Cell number on file:    Telephone Information:   Mobile [phone]

## 2022-11-02 NOTE — PROGRESS NOTES
Creatinine level came back, ok for patient to continue with current Lovenox bridge dosing.    Called patient, left message advising patient to continue same Lovenox dosing and to return call if he has any questions or concerns.    Luna Oakley RN, BSN  Anticoagulation Clinic

## 2022-11-07 ENCOUNTER — LAB (OUTPATIENT)
Dept: LAB | Facility: CLINIC | Age: 67
End: 2022-11-07
Payer: COMMERCIAL

## 2022-11-07 ENCOUNTER — ANTICOAGULATION THERAPY VISIT (OUTPATIENT)
Dept: ANTICOAGULATION | Facility: CLINIC | Age: 67
End: 2022-11-07

## 2022-11-07 DIAGNOSIS — D68.51 HETEROZYGOUS FACTOR V LEIDEN MUTATION (H): ICD-10-CM

## 2022-11-07 DIAGNOSIS — D68.52 PROTHROMBIN GENE MUTATION (H): ICD-10-CM

## 2022-11-07 DIAGNOSIS — Z86.718 PERSONAL HISTORY OF VENOUS THROMBOSIS AND EMBOLISM: ICD-10-CM

## 2022-11-07 DIAGNOSIS — Z79.01 LONG TERM CURRENT USE OF ANTICOAGULANT THERAPY: ICD-10-CM

## 2022-11-07 DIAGNOSIS — Z79.01 LONG TERM CURRENT USE OF ANTICOAGULANT THERAPY: Primary | ICD-10-CM

## 2022-11-07 LAB — INR BLD: 1.5 (ref 0.9–1.1)

## 2022-11-07 PROCEDURE — 36416 COLLJ CAPILLARY BLOOD SPEC: CPT

## 2022-11-07 PROCEDURE — 85610 PROTHROMBIN TIME: CPT

## 2022-11-07 NOTE — PROGRESS NOTES
ANTICOAGULATION MANAGEMENT     Alvin Ontiveros 67 year old male is on warfarin with subtherapeutic INR result. (Goal INR 2.0-3.0)    Recent labs: (last 7 days)     11/07/22  1338   INR 1.5*       ASSESSMENT       Source(s): Chart Review and Patient/Caregiver Call       Warfarin doses taken: Warfarin taken as instructed, had warfarin hold for colonoscopy, resumed warfarin 11/3/22, Patient reports he did not take his Lovenox injection this morning because he had a bloody nose, will resume Lovenox     Diet: No new diet changes identified    New illness, injury, or hospitalization: No    Medication/supplement changes: None noted    Signs or symptoms of bleeding or clotting: Yes: Patient had a bloody nose this morning, stopped within 2 minutes    Previous INR: Therapeutic last 2(+) visits prior to hold for colonoscopy    Additional findings: bridging with Lovenox until INR >2.0       PLAN     Recommended plan for temporary change(s) affecting INR     Dosing Instructions: Continue your current warfarin dose with next INR in 3 days. Continue Lovenox BID      Summary  As of 11/7/2022    Full warfarin instructions:  11/7: 10 mg; Otherwise 2.5 mg every Sun, Fri; 5 mg all other days; Starting 11/7/2022   Next INR check:  11/10/2022             Telephone call with Alvin who agrees to plan and repeated back plan correctly    Lab visit scheduled    Education provided:     Please call back if any changes to your diet, medications or how you've been taking warfarin    Contact [phone]  with any changes, questions or concerns.     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

## 2022-11-10 ENCOUNTER — LAB (OUTPATIENT)
Dept: LAB | Facility: CLINIC | Age: 67
End: 2022-11-10
Payer: COMMERCIAL

## 2022-11-10 ENCOUNTER — ANTICOAGULATION THERAPY VISIT (OUTPATIENT)
Dept: ANTICOAGULATION | Facility: CLINIC | Age: 67
End: 2022-11-10

## 2022-11-10 DIAGNOSIS — Z86.718 PERSONAL HISTORY OF VENOUS THROMBOSIS AND EMBOLISM: ICD-10-CM

## 2022-11-10 DIAGNOSIS — Z79.01 LONG TERM CURRENT USE OF ANTICOAGULANT THERAPY: ICD-10-CM

## 2022-11-10 DIAGNOSIS — D68.52 PROTHROMBIN GENE MUTATION (H): ICD-10-CM

## 2022-11-10 DIAGNOSIS — Z79.01 LONG TERM CURRENT USE OF ANTICOAGULANT THERAPY: Primary | ICD-10-CM

## 2022-11-10 DIAGNOSIS — D68.51 HETEROZYGOUS FACTOR V LEIDEN MUTATION (H): ICD-10-CM

## 2022-11-10 LAB — INR BLD: 2.2 (ref 0.9–1.1)

## 2022-11-10 PROCEDURE — 36415 COLL VENOUS BLD VENIPUNCTURE: CPT

## 2022-11-10 PROCEDURE — 85610 PROTHROMBIN TIME: CPT

## 2022-11-10 NOTE — PROGRESS NOTES
ANTICOAGULATION MANAGEMENT     Alvin Ontiveros 67 year old male is on warfarin with therapeutic INR result. (Goal INR 2.0-3.0)    Recent labs: (last 7 days)     11/10/22  1509   INR 2.2*       ASSESSMENT       Source(s): Chart Review and Patient/Caregiver Call       Warfarin doses taken: Warfarin taken as instructed.  Also using Lovenox bridging.    Diet: No new diet changes identified    New illness, injury, or hospitalization: No    Medication/supplement changes: None noted    Signs or symptoms of bleeding or clotting: No    Previous INR: Subtherapeutic    Additional findings: None       PLAN     Recommended plan for temporary change(s) affecting INR     Dosing Instructions: Continue your current warfarin dose with next INR in 2 weeks       Summary  As of 11/10/2022    Full warfarin instructions:  2.5 mg every Sun, Fri; 5 mg all other days; Starting 11/10/2022   Next INR check:  11/22/2022             Telephone call with Alvin who agrees to plan and repeated back plan correctly    Lab visit scheduled    Education provided:     Please call back if any changes to your diet, medications or how you've been taking warfarin    Plan made per 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

## 2022-11-14 DIAGNOSIS — D68.51 HETEROZYGOUS FACTOR V LEIDEN MUTATION (H): ICD-10-CM

## 2022-11-15 ENCOUNTER — LAB (OUTPATIENT)
Dept: LAB | Facility: CLINIC | Age: 67
End: 2022-11-15
Payer: COMMERCIAL

## 2022-11-15 DIAGNOSIS — Z00.00 ROUTINE GENERAL MEDICAL EXAMINATION AT A HEALTH CARE FACILITY: Primary | ICD-10-CM

## 2022-11-15 DIAGNOSIS — Z86.718 PERSONAL HISTORY OF VENOUS THROMBOSIS AND EMBOLISM: ICD-10-CM

## 2022-11-15 DIAGNOSIS — D68.52 PROTHROMBIN GENE MUTATION (H): ICD-10-CM

## 2022-11-15 DIAGNOSIS — Z79.01 LONG TERM CURRENT USE OF ANTICOAGULANT THERAPY: ICD-10-CM

## 2022-11-15 DIAGNOSIS — D68.51 HETEROZYGOUS FACTOR V LEIDEN MUTATION (H): ICD-10-CM

## 2022-11-15 LAB
ALBUMIN SERPL BCG-MCNC: 4.1 G/DL (ref 3.5–5.2)
ALP SERPL-CCNC: 52 U/L (ref 40–129)
ALT SERPL W P-5'-P-CCNC: 66 U/L (ref 10–50)
ANION GAP SERPL CALCULATED.3IONS-SCNC: 11 MMOL/L (ref 7–15)
AST SERPL W P-5'-P-CCNC: 30 U/L (ref 10–50)
BASOPHILS # BLD AUTO: 0 10E3/UL (ref 0–0.2)
BASOPHILS NFR BLD AUTO: 1 %
BILIRUB SERPL-MCNC: 0.7 MG/DL
BUN SERPL-MCNC: 15.5 MG/DL (ref 8–23)
CALCIUM SERPL-MCNC: 10.8 MG/DL (ref 8.8–10.2)
CHLORIDE SERPL-SCNC: 103 MMOL/L (ref 98–107)
CHOLEST SERPL-MCNC: 198 MG/DL
CREAT SERPL-MCNC: 0.88 MG/DL (ref 0.67–1.17)
DEPRECATED HCO3 PLAS-SCNC: 25 MMOL/L (ref 22–29)
EOSINOPHIL # BLD AUTO: 0.2 10E3/UL (ref 0–0.7)
EOSINOPHIL NFR BLD AUTO: 4 %
ERYTHROCYTE [DISTWIDTH] IN BLOOD BY AUTOMATED COUNT: 12.7 % (ref 10–15)
GFR SERPL CREATININE-BSD FRML MDRD: >90 ML/MIN/1.73M2
GLUCOSE SERPL-MCNC: 123 MG/DL (ref 70–99)
HBA1C MFR BLD: 5.8 % (ref 0–5.6)
HCT VFR BLD AUTO: 43.4 % (ref 40–53)
HDLC SERPL-MCNC: 79 MG/DL
HGB BLD-MCNC: 15.3 G/DL (ref 13.3–17.7)
IMM GRANULOCYTES # BLD: 0 10E3/UL
IMM GRANULOCYTES NFR BLD: 0 %
LDLC SERPL CALC-MCNC: 95 MG/DL
LYMPHOCYTES # BLD AUTO: 1.4 10E3/UL (ref 0.8–5.3)
LYMPHOCYTES NFR BLD AUTO: 34 %
MCH RBC QN AUTO: 31.2 PG (ref 26.5–33)
MCHC RBC AUTO-ENTMCNC: 35.3 G/DL (ref 31.5–36.5)
MCV RBC AUTO: 89 FL (ref 78–100)
MONOCYTES # BLD AUTO: 0.3 10E3/UL (ref 0–1.3)
MONOCYTES NFR BLD AUTO: 8 %
NEUTROPHILS # BLD AUTO: 2.1 10E3/UL (ref 1.6–8.3)
NEUTROPHILS NFR BLD AUTO: 53 %
NONHDLC SERPL-MCNC: 119 MG/DL
PLATELET # BLD AUTO: 140 10E3/UL (ref 150–450)
POTASSIUM SERPL-SCNC: 4.3 MMOL/L (ref 3.4–5.3)
PROT SERPL-MCNC: 6.7 G/DL (ref 6.4–8.3)
PSA SERPL-MCNC: 0.81 NG/ML (ref 0–4.5)
RBC # BLD AUTO: 4.9 10E6/UL (ref 4.4–5.9)
SODIUM SERPL-SCNC: 139 MMOL/L (ref 136–145)
TRIGL SERPL-MCNC: 118 MG/DL
TSH SERPL DL<=0.005 MIU/L-ACNC: 2.56 UIU/ML (ref 0.3–4.2)
WBC # BLD AUTO: 4 10E3/UL (ref 4–11)

## 2022-11-15 PROCEDURE — 80050 GENERAL HEALTH PANEL: CPT

## 2022-11-15 PROCEDURE — 83036 HEMOGLOBIN GLYCOSYLATED A1C: CPT

## 2022-11-15 PROCEDURE — 80061 LIPID PANEL: CPT

## 2022-11-15 PROCEDURE — G0103 PSA SCREENING: HCPCS

## 2022-11-15 PROCEDURE — 36415 COLL VENOUS BLD VENIPUNCTURE: CPT

## 2022-11-15 RX ORDER — WARFARIN SODIUM 5 MG/1
TABLET ORAL
Qty: 90 TABLET | Refills: 0 | Status: SHIPPED | OUTPATIENT
Start: 2022-11-15 | End: 2022-11-22

## 2022-11-15 NOTE — TELEPHONE ENCOUNTER
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 Rx instructions and quantity supplied updated to match patient's current dosing plan. 90 day supply with 0 refills granted per [facility] protocol     Pearl Meza RN  Anticoagulation Clinic

## 2022-11-22 ENCOUNTER — ANTICOAGULATION THERAPY VISIT (OUTPATIENT)
Dept: ANTICOAGULATION | Facility: CLINIC | Age: 67
End: 2022-11-22

## 2022-11-22 ENCOUNTER — OFFICE VISIT (OUTPATIENT)
Dept: INTERNAL MEDICINE | Facility: CLINIC | Age: 67
End: 2022-11-22
Payer: COMMERCIAL

## 2022-11-22 VITALS
WEIGHT: 227 LBS | OXYGEN SATURATION: 99 % | DIASTOLIC BLOOD PRESSURE: 72 MMHG | TEMPERATURE: 97.9 F | RESPIRATION RATE: 16 BRPM | BODY MASS INDEX: 30.75 KG/M2 | SYSTOLIC BLOOD PRESSURE: 138 MMHG | HEIGHT: 72 IN | HEART RATE: 73 BPM

## 2022-11-22 DIAGNOSIS — R73.03 PREDIABETES: ICD-10-CM

## 2022-11-22 DIAGNOSIS — D68.51 HETEROZYGOUS FACTOR V LEIDEN MUTATION (H): ICD-10-CM

## 2022-11-22 DIAGNOSIS — I10 ESSENTIAL HYPERTENSION: ICD-10-CM

## 2022-11-22 DIAGNOSIS — Z00.00 ENCOUNTER FOR MEDICARE ANNUAL WELLNESS EXAM: Primary | ICD-10-CM

## 2022-11-22 DIAGNOSIS — Z79.01 LONG TERM CURRENT USE OF ANTICOAGULANT THERAPY: Primary | ICD-10-CM

## 2022-11-22 DIAGNOSIS — Z79.01 LONG TERM CURRENT USE OF ANTICOAGULANT THERAPY: ICD-10-CM

## 2022-11-22 DIAGNOSIS — H91.93 BILATERAL HEARING LOSS, UNSPECIFIED HEARING LOSS TYPE: ICD-10-CM

## 2022-11-22 DIAGNOSIS — Z86.718 PERSONAL HISTORY OF VENOUS THROMBOSIS AND EMBOLISM: ICD-10-CM

## 2022-11-22 DIAGNOSIS — D68.52 PROTHROMBIN GENE MUTATION (H): ICD-10-CM

## 2022-11-22 DIAGNOSIS — Z11.59 NEED FOR HEPATITIS C SCREENING TEST: ICD-10-CM

## 2022-11-22 DIAGNOSIS — N52.01 ERECTILE DYSFUNCTION DUE TO ARTERIAL INSUFFICIENCY: ICD-10-CM

## 2022-11-22 DIAGNOSIS — I26.99 PULMONARY EMBOLISM, UNSPECIFIED CHRONICITY, UNSPECIFIED PULMONARY EMBOLISM TYPE, UNSPECIFIED WHETHER ACUTE COR PULMONALE PRESENT (H): ICD-10-CM

## 2022-11-22 DIAGNOSIS — E03.4 HYPOTHYROIDISM DUE TO ACQUIRED ATROPHY OF THYROID: ICD-10-CM

## 2022-11-22 DIAGNOSIS — M72.0 DUPUYTREN'S CONTRACTURE OF BOTH HANDS: ICD-10-CM

## 2022-11-22 DIAGNOSIS — K21.9 GASTROESOPHAGEAL REFLUX DISEASE WITHOUT ESOPHAGITIS: ICD-10-CM

## 2022-11-22 DIAGNOSIS — E78.5 HYPERLIPIDEMIA LDL GOAL <130: ICD-10-CM

## 2022-11-22 DIAGNOSIS — Z23 HIGH PRIORITY FOR 2019-NCOV VACCINE: ICD-10-CM

## 2022-11-22 LAB — INR BLD: 1.7 (ref 0.9–1.1)

## 2022-11-22 PROCEDURE — 85610 PROTHROMBIN TIME: CPT | Performed by: INTERNAL MEDICINE

## 2022-11-22 PROCEDURE — 90471 IMMUNIZATION ADMIN: CPT | Performed by: INTERNAL MEDICINE

## 2022-11-22 PROCEDURE — 99214 OFFICE O/P EST MOD 30 MIN: CPT | Mod: 25 | Performed by: INTERNAL MEDICINE

## 2022-11-22 PROCEDURE — 36416 COLLJ CAPILLARY BLOOD SPEC: CPT | Performed by: INTERNAL MEDICINE

## 2022-11-22 PROCEDURE — 91313 COVID-19,PF,MODERNA BIVALENT: CPT | Performed by: INTERNAL MEDICINE

## 2022-11-22 PROCEDURE — 99397 PER PM REEVAL EST PAT 65+ YR: CPT | Mod: 25 | Performed by: INTERNAL MEDICINE

## 2022-11-22 PROCEDURE — 0134A COVID-19,PF,MODERNA BIVALENT: CPT | Performed by: INTERNAL MEDICINE

## 2022-11-22 PROCEDURE — 90677 PCV20 VACCINE IM: CPT | Performed by: INTERNAL MEDICINE

## 2022-11-22 RX ORDER — PRAVASTATIN SODIUM 40 MG
TABLET ORAL
Qty: 90 TABLET | Refills: 3 | Status: SHIPPED | OUTPATIENT
Start: 2022-11-22 | End: 2023-03-22

## 2022-11-22 RX ORDER — HYDROCHLOROTHIAZIDE 25 MG/1
25 TABLET ORAL DAILY
Qty: 90 TABLET | Refills: 3 | Status: SHIPPED | OUTPATIENT
Start: 2022-11-22 | End: 2023-03-22

## 2022-11-22 RX ORDER — WARFARIN SODIUM 5 MG/1
TABLET ORAL
Qty: 90 TABLET | Refills: 3 | Status: SHIPPED | OUTPATIENT
Start: 2022-11-22 | End: 2023-03-22

## 2022-11-22 RX ORDER — LEVOTHYROXINE SODIUM 75 UG/1
TABLET ORAL
Qty: 90 TABLET | Refills: 3 | Status: SHIPPED | OUTPATIENT
Start: 2022-11-22 | End: 2023-03-22

## 2022-11-22 RX ORDER — FOLIC ACID 1 MG/1
1000 TABLET ORAL DAILY
Qty: 90 TABLET | Refills: 3 | Status: SHIPPED | OUTPATIENT
Start: 2022-11-22 | End: 2023-05-16

## 2022-11-22 RX ORDER — LOSARTAN POTASSIUM 100 MG/1
100 TABLET ORAL DAILY
Qty: 90 TABLET | Refills: 3 | Status: SHIPPED | OUTPATIENT
Start: 2022-11-22 | End: 2023-03-22

## 2022-11-22 RX ORDER — SILDENAFIL 100 MG/1
100 TABLET, FILM COATED ORAL DAILY PRN
Qty: 12 TABLET | Refills: 11 | Status: SHIPPED | OUTPATIENT
Start: 2022-11-22 | End: 2023-12-01

## 2022-11-22 SDOH — HEALTH STABILITY: PHYSICAL HEALTH: ON AVERAGE, HOW MANY MINUTES DO YOU ENGAGE IN EXERCISE AT THIS LEVEL?: 40 MIN

## 2022-11-22 SDOH — ECONOMIC STABILITY: FOOD INSECURITY: WITHIN THE PAST 12 MONTHS, YOU WORRIED THAT YOUR FOOD WOULD RUN OUT BEFORE YOU GOT MONEY TO BUY MORE.: NEVER TRUE

## 2022-11-22 SDOH — HEALTH STABILITY: PHYSICAL HEALTH: ON AVERAGE, HOW MANY DAYS PER WEEK DO YOU ENGAGE IN MODERATE TO STRENUOUS EXERCISE (LIKE A BRISK WALK)?: 5 DAYS

## 2022-11-22 SDOH — ECONOMIC STABILITY: INCOME INSECURITY: IN THE LAST 12 MONTHS, WAS THERE A TIME WHEN YOU WERE NOT ABLE TO PAY THE MORTGAGE OR RENT ON TIME?: NO

## 2022-11-22 SDOH — ECONOMIC STABILITY: FOOD INSECURITY: WITHIN THE PAST 12 MONTHS, THE FOOD YOU BOUGHT JUST DIDN'T LAST AND YOU DIDN'T HAVE MONEY TO GET MORE.: NEVER TRUE

## 2022-11-22 ASSESSMENT — ENCOUNTER SYMPTOMS
PALPITATIONS: 0
NAUSEA: 0
HEADACHES: 0
ARTHRALGIAS: 0
NERVOUS/ANXIOUS: 0
MYALGIAS: 0
FEVER: 0
CHILLS: 0
SHORTNESS OF BREATH: 0
HEMATOCHEZIA: 0
DYSURIA: 0
COUGH: 0
DIARRHEA: 0
WEAKNESS: 0
ABDOMINAL PAIN: 0
PARESTHESIAS: 0
JOINT SWELLING: 0
HEMATURIA: 0
EYE PAIN: 0
HEARTBURN: 0
FREQUENCY: 0
DIZZINESS: 0
SORE THROAT: 0
CONSTIPATION: 0

## 2022-11-22 ASSESSMENT — SOCIAL DETERMINANTS OF HEALTH (SDOH)
WITHIN THE LAST YEAR, HAVE TO BEEN RAPED OR FORCED TO HAVE ANY KIND OF SEXUAL ACTIVITY BY YOUR PARTNER OR EX-PARTNER?: NO
WITHIN THE LAST YEAR, HAVE YOU BEEN AFRAID OF YOUR PARTNER OR EX-PARTNER?: NO
WITHIN THE LAST YEAR, HAVE YOU BEEN HUMILIATED OR EMOTIONALLY ABUSED IN OTHER WAYS BY YOUR PARTNER OR EX-PARTNER?: NO
HOW HARD IS IT FOR YOU TO PAY FOR THE VERY BASICS LIKE FOOD, HOUSING, MEDICAL CARE, AND HEATING?: NOT HARD AT ALL
WITHIN THE LAST YEAR, HAVE YOU BEEN KICKED, HIT, SLAPPED, OR OTHERWISE PHYSICALLY HURT BY YOUR PARTNER OR EX-PARTNER?: NO

## 2022-11-22 ASSESSMENT — LIFESTYLE VARIABLES
AUDIT-C TOTAL SCORE: 3
HOW OFTEN DO YOU HAVE SIX OR MORE DRINKS ON ONE OCCASION: NEVER
HOW OFTEN DO YOU HAVE A DRINK CONTAINING ALCOHOL: 2-3 TIMES A WEEK
SKIP TO QUESTIONS 9-10: 1
HOW MANY STANDARD DRINKS CONTAINING ALCOHOL DO YOU HAVE ON A TYPICAL DAY: 1 OR 2

## 2022-11-22 ASSESSMENT — ACTIVITIES OF DAILY LIVING (ADL): CURRENT_FUNCTION: NO ASSISTANCE NEEDED

## 2022-11-22 NOTE — PATIENT INSTRUCTIONS
Patient Education   Personalized Prevention Plan  You are due for the preventive services outlined below.  Your care team is available to assist you in scheduling these services.  If you have already completed any of these items, please share that information with your care team to update in your medical record.  Health Maintenance Due   Topic Date Due    Hepatitis C Screening  Never done    Zoster (Shingles) Vaccine (1 of 2) Never done    AORTIC ANEURYSM SCREENING (SYSTEM ASSIGNED)  Never done    Pneumococcal Vaccine (2 - PCV) 11/10/2021    COVID-19 Vaccine (3 - Booster for Dayana series) 01/07/2022    ANNUAL REVIEW OF HM ORDERS  11/12/2022       Signs of Hearing Loss      Hearing much better with one ear can be a sign of hearing loss.   Hearing loss is a problem shared by many people. In fact, it is one of the most common health problems, particularly as people age. Most people age 65 and older have some hearing loss. By age 80, almost everyone does. Hearing loss often occurs slowly over the years. So you may not realize your hearing has gotten worse.  Have your hearing checked  Call your healthcare provider if you:  Have to strain to hear normal conversation  Have to watch other people s faces very carefully to follow what they re saying  Need to ask people to repeat what they ve said  Often misunderstand what people are saying  Turn the volume of the television or radio up so high that others complain  Feel that people are mumbling when they re talking to you  Find that the effort to hear leaves you feeling tired and irritated  Notice, when using the phone, that you hear better with one ear than the other  [employer] last reviewed this educational content on 1/1/2020 2000-2021 [employer]. All rights reserved. This information is not intended as a substitute for professional medical care. Always follow your healthcare professional's instructions.        Everything looks fine!    Refills of  medication have been faxed to your pharmacy.     Lab results look fine on [facility].    Someone will contact you to help you to schedule a hand surgery appointment and an ENT appointment to check hearing.      See me in a year, sooner if problems.

## 2022-11-22 NOTE — PROGRESS NOTES
ANTICOAGULATION MANAGEMENT     Alvin Ontiveros 67 year old male is on warfarin with subtherapeutic INR result. (Goal INR 2.0-3.0)    Recent labs: (last 7 days)     11/22/22  0834   INR 1.7*       ASSESSMENT       Source(s): Chart Review and Patient/Caregiver Call       Warfarin doses taken: Warfarin taken as instructed    Diet: No new diet changes identified--eats salad and green beans but not consistently.    New illness, injury, or hospitalization: No    Medication/supplement changes: None noted    Signs or symptoms of bleeding or clotting: No    Previous INR: Therapeutic last visit; previously outside of goal range    Additional findings: patient has been exercising on the treadmill for 40 minutes 6 days per week and plans to continue.    Patient saw PCP today for annual wellness, he got a referral to orthopedics for Dupuytren's contracture of both hands.       PLAN     Recommended plan for ongoing change(s) affecting INR     Dosing Instructions: Increase your warfarin dose (8% change) with next INR in 2 weeks       Summary  As of 11/22/2022    Full warfarin instructions:  2.5 mg every Sun; 5 mg all other days; Starting 11/22/2022   Next INR check:  12/6/2022             Telephone call with Parmjit who verbalizes understanding and agrees to plan    Lab visit scheduled    Education provided:     Contact [phone]  with any changes, questions or concerns.      And how exercise can affect INR    Plan made per [facility] anticoagulation protocol    Maryam Boggs, RN  Anticoagulation Clinic  11/22/2022    _______________________________________________________________________     Anticoagulation Episode Summary     Current INR goal:  2.0-3.0   TTR:  89.0 % (1 y)   Target end date:  Indefinite   Send INR reminders to:  [facility]    Indications    Long-term (current) use of anticoagulants [Z79.01] [Z79.01]  Heterozygous factor V Leiden mutation (H) [D68.51]  Heterozygous Prothrombin Gene Mutation  [D68.52]  Personal history of venous thrombosis and embolism [Z86.718]           Comments:           Anticoagulation Care Providers     Provider Role Specialty Phone number    Du Almaraz MD Referring Internal Medicine [phone]

## 2022-11-22 NOTE — PROGRESS NOTES
"SUBJECTIVE:   CC: Alvin is an 67 year old who presents for preventative health visit.         Patient has been advised of split billing requirements and indicates understanding: Yes  Healthy Habits:     In general, how would you rate your overall health?  Excellent    Frequency of exercise:  4-5 days/week    Duration of exercise:  30-45 minutes    Do you usually eat at least 4 servings of fruit and vegetables a day, include whole grains    & fiber and avoid regularly eating high fat or \"junk\" foods?  Yes    Taking medications regularly:  Yes    Medication side effects:  Not applicable    Ability to successfully perform activities of daily living:  No assistance needed    Home Safety:  No safety concerns identified    Hearing Impairment:  Need to ask people to speak up or repeat themselves    In the past 6 months, have you been bothered by leaking of urine?  No    In general, how would you rate your overall mental or emotional health?  Excellent      PHQ-2 Total Score: 0    Additional concerns today:  No             PROBLEMS TO ADD ON...In addition to an Annual Wellness Exam, we addressed prediabetes, hypertension, hyperlipidemia, GERD, hypothyroidism, left bilateral hand contractures, erectile dysfunction, hearing loss.       The patient is tolerating his current medications without any adverse effects.      No symptoms of thyroid disturbance. Recent TSH normal.   Reflux symptoms well controlled on omeprazole.      BP appears controlled on Losartan and hydrochlorothiazide.     We reviewed recent labs including lipid panel. Will refill pravastatin.   A1c remains in prediabetes range. Reviewed importance of maintaining weight and monitoring carbohydrate portions.   He has previously been prescribed sildenafil for erectile dysfunction but has never taken it.  He is offered a new Rx with directions for use and potential benefits/side effects.      Offered referrals to hand surgery for bilateral palmar contractions, " and to ENT for hearing loss.         Today's PHQ-2 Score:   PHQ-2 (  [employer]) 2022   Q1: Little interest or pleasure in doing things 0   Q2: Feeling down, depressed or hopeless 0   PHQ-2 Score 0   PHQ-2 Total Score (12-17 Years)- Positive if 3 or more points; Administer PHQ-A if positive -   Q1: Little interest or pleasure in doing things Not at all   Q2: Feeling down, depressed or hopeless Not at all   PHQ-2 Score 0           Social History     Tobacco Use     Smoking status: Former     Packs/day: 1.00     Years: 30.00     Pack years: 30.00     Types: Cigarettes     Quit date: 2005     Years since quittin.9     Smokeless tobacco: Never   Substance Use Topics     Alcohol use: Yes     If you drink alcohol do you typically have >3 drinks per day or >7 drinks per week? No    Alcohol Use 2022   Prescreen: >3 drinks/day or >7 drinks/week? No   Prescreen: >3 drinks/day or >7 drinks/week? -       Last PSA:   PSA   Date Value Ref Range Status   2020 0.88 0 - 4 ug/L Final     Comment:     Assay Method:  Chemiluminescence using Siemens Vista analyzer     Prostate Specific Antigen Screen   Date Value Ref Range Status   11/15/2022 0.81 0.00 - 4.50 ng/mL Final   2021 1.07 0.00 - 4.00 ug/L Final       Reviewed orders with patient. Reviewed health maintenance and updated orders accordingly - Yes    Reviewed and updated as needed this visit by clinical staff   Tobacco  Allergies  Meds              Reviewed and updated as needed this visit by Provider                   Review of Systems   Constitutional: Negative for chills and fever.   HENT: Negative for congestion, ear pain, hearing loss and sore throat.    Eyes: Negative for pain and visual disturbance.   Respiratory: Negative for cough and shortness of breath.    Cardiovascular: Negative for chest pain, palpitations and peripheral edema.   Gastrointestinal: Negative for abdominal pain, constipation, diarrhea, heartburn, hematochezia and  nausea.   Genitourinary: Negative for dysuria, frequency, genital sores, hematuria, impotence, penile discharge and urgency.   Musculoskeletal: Negative for arthralgias, joint swelling and myalgias.   Skin: Negative for rash.   Neurological: Negative for dizziness, weakness, headaches and paresthesias.   Psychiatric/Behavioral: Negative for mood changes. The patient is not nervous/anxious.        OBJECTIVE:   /72   Pulse 73   Temp 97.9  F (36.6  C) (Tympanic)   Resp 16   Ht 1.829 m (6')   Wt 103 kg (227 lb)   SpO2 99%   BMI 30.79 kg/m      Physical Exam  GENERAL: healthy, alert and no distress  EYES: Eyes grossly normal to inspection, PERRL and conjunctivae and sclerae normal  HENT: ear canals and TM's normal, nose and mouth without ulcers or lesions  NECK: no adenopathy, no asymmetry, masses, or scars and thyroid normal to palpation  RESP: lungs clear to auscultation - no rales, rhonchi or wheezes  CV: regular rate and rhythm, normal S1 S2, no S3 or S4, no murmur, click or rub, no peripheral edema and peripheral pulses strong  ABDOMEN: soft, nontender, no hepatosplenomegaly, no masses and bowel sounds normal  MS: visible contractures in bilateral palms. No gross musculoskeletal defects noted, no edema  SKIN: no suspicious lesions or rashes  NEURO: Normal strength and tone, mentation intact and speech normal  PSYCH: mentation appears normal, affect normal/bright    Diagnostic Test Results:  Labs reviewed in Epic    ASSESSMENT/PLAN:   (Z00.00) Encounter for Medicare annual wellness exam  (primary encounter diagnosis)  Comment: Stable health. See epic orders.     (I26.99) Pulmonary embolism, unspecified chronicity, unspecified pulmonary embolism type, unspecified whether acute cor pulmonale present (H)  Comment: Prior pulmonary embolism on chronic oral anticoagulation. Refill warfarin. Checking INR today.   Plan: OFFICE/OUTPT VISIT,EST,LEVL IV          (M72.0) Dupuytren's contracture of both  hands  Comment: Offered referral to hand surgeon.   Plan: Orthopedic  Referral, OFFICE/OUTPT         VISIT,EST,LEVL IV          (N52.01) Erectile dysfunction due to arterial insufficiency  Comment: Offered Rx and directions for use for Viagra.   Plan: sildenafil (VIAGRA) 100 MG tablet, OFFICE/OUTPT        VISIT,EST,LEVL IV          (R73.03) Prediabetes  Comment: Discussed moderating intake of carbohydrate portions.   Plan: folic acid (FOLVITE) 1 MG tablet, OFFICE/OUTPT         VISIT,EST,LEVL IV          (I10) Essential hypertension  Comment: BP satisfactorily controlled. Continue current meds.   Plan: hydrochlorothiazide (HYDRODIURIL) 25 MG tablet,        losartan (COZAAR) 100 MG tablet, OFFICE/OUTPT         VISIT,EST,LEVL IV          (E03.4) Hypothyroidism due to acquired atrophy of thyroid  Comment: Euthyroid clinically and by TSH. Refill Rx.   Plan: levothyroxine (SYNTHROID/LEVOTHROID) 75 MCG         tablet, OFFICE/OUTPT VISIT,EST,LEVL IV          (K21.9) Gastroesophageal reflux disease without esophagitis  Comment: Well controlled. Continue current meds.   Plan: omeprazole (PRILOSEC) 20 MG DR capsule,         OFFICE/OUTPT VISIT,EST,LEVL IV          (E78.5) Hyperlipidemia LDL goal <130  Comment: LDL-Cholesterol satisfactorily controlled.   Plan: pravastatin (PRAVACHOL) 40 MG tablet,         OFFICE/OUTPT VISIT,EST,LEVL IV          (H91.93) Bilateral hearing loss, unspecified hearing loss type  Plan: Adult ENT  Referral          (D68.51) Heterozygous Factor V Leiden Mutation  Comment: Continue chronic oral anticoagulation.   Plan: warfarin ANTICOAGULANT (JANTOVEN ANTICOAGULANT)        5 MG tablet          (Z11.59) Need for hepatitis C screening test  Comment: Future Hepatitis C order placed.   Plan: Hepatitis C Screen Reflex to HCV RNA Quant and         Genotype              Patient has been advised of split billing requirements and indicates understanding: Yes      COUNSELING:   Reviewed  preventive health counseling, as reflected in patient instructions      BMI:   Estimated body mass index is 30.79 kg/m  as calculated from the following:    Height as of this encounter: 1.829 m (6').    Weight as of this encounter: 103 kg (227 lb).   Weight management plan: Discussed healthy diet and exercise guidelines      He reports that he quit smoking about 16 years ago. His smoking use included cigarettes. He has a 30.00 pack-year smoking history. He has never used smokeless tobacco.      Du Almaraz MD,   [facility]      Patient Instructions     Everything looks fine!    Refills of medication have been faxed to your pharmacy.     Lab results look fine on MyChart.    Someone will contact you to help you to schedule a hand surgery appointment and an ENT appointment to check hearing.      See me in a year, sooner if problems.

## 2022-11-22 NOTE — NURSING NOTE
Prior to immunization administration, verified patients identity using patient s name and date of birth. Please see Immunization Activity for additional information.     Screening Questionnaire for Adult Immunization    Are you sick today?   No   Do you have allergies to medications, food, a vaccine component or latex?   No   Have you ever had a serious reaction after receiving a vaccination?   No   Do you have a long-term health problem with heart, lung, kidney, or metabolic disease (e.g., diabetes), asthma, a blood disorder, no spleen, complement component deficiency, a cochlear implant, or a spinal fluid leak?  Are you on long-term aspirin therapy?   No   Do you have cancer, leukemia, HIV/AIDS, or any other immune system problem?   No   Do you have a parent, brother, or sister with an immune system problem?   No   In the past 3 months, have you taken medications that affect  your immune system, such as prednisone, other steroids, or anticancer drugs; drugs for the treatment of rheumatoid arthritis, Crohn s disease, or psoriasis; or have you had radiation treatments?   No   Have you had a seizure, or a brain or other nervous system problem?   No   During the past year, have you received a transfusion of blood or blood    products, or been given immune (gamma) globulin or antiviral drug?   No   For women: Are you pregnant or is there a chance you could become       pregnant during the next month?   No   Have you received any vaccinations in the past 4 weeks?   No     Immunization questionnaire answers were all negative.        Per orders of Dr. Almaraz, injection of Prevnar 20 given by Vanessa Temple CMA. Patient instructed to remain in clinic for 15 minutes afterwards, and to report any adverse reaction to me immediately.       Screening performed by Vanessa Temple CMA on 11/22/2022 at 8:13 AM.

## 2022-11-22 NOTE — PROGRESS NOTES
"    The patient was provided with written information regarding signs of hearing loss.  Answers for HPI/ROS submitted by the patient on 11/22/2022  In general, how would you rate your overall physical health?: excellent  Frequency of exercise:: 4-5 days/week  Do you usually eat at least 4 servings of fruit and vegetables a day, include whole grains & fiber, and avoid regularly eating high fat or \"junk\" foods? : Yes  Taking medications regularly:: Yes  Medication side effects:: Not applicable  Activities of Daily Living: no assistance needed  Home safety: no safety concerns identified  Hearing Impairment:: need to ask people to speak up or repeat themselves  In the past 6 months, have you been bothered by leaking of urine?: No  abdominal pain: No  Blood in stool: No  Blood in urine: No  chest pain: No  chills: No  congestion: No  constipation: No  cough: No  diarrhea: No  dizziness: No  ear pain: No  eye pain: No  nervous/anxious: No  fever: No  frequency: No  genital sores: No  headaches: No  hearing loss: No  heartburn: No  arthralgias: No  joint swelling: No  peripheral edema: No  mood changes: No  myalgias: No  nausea: No  dysuria: No  palpitations: No  Skin sensation changes: No  sore throat: No  urgency: No  rash: No  shortness of breath: No  visual disturbance: No  weakness: No  impotence: No  penile discharge: No  In general, how would you rate your overall mental or emotional health?: excellent  Additional concerns today:: No  Duration of exercise:: 30-45 minutes        "

## 2022-12-06 ENCOUNTER — ANTICOAGULATION THERAPY VISIT (OUTPATIENT)
Dept: ANTICOAGULATION | Facility: CLINIC | Age: 67
End: 2022-12-06

## 2022-12-06 ENCOUNTER — LAB (OUTPATIENT)
Dept: LAB | Facility: CLINIC | Age: 67
End: 2022-12-06
Payer: COMMERCIAL

## 2022-12-06 DIAGNOSIS — Z79.01 LONG TERM CURRENT USE OF ANTICOAGULANT THERAPY: Primary | ICD-10-CM

## 2022-12-06 DIAGNOSIS — D68.52 PROTHROMBIN GENE MUTATION (H): ICD-10-CM

## 2022-12-06 DIAGNOSIS — Z11.59 NEED FOR HEPATITIS C SCREENING TEST: ICD-10-CM

## 2022-12-06 DIAGNOSIS — Z79.01 LONG TERM CURRENT USE OF ANTICOAGULANT THERAPY: ICD-10-CM

## 2022-12-06 DIAGNOSIS — D68.51 HETEROZYGOUS FACTOR V LEIDEN MUTATION (H): ICD-10-CM

## 2022-12-06 DIAGNOSIS — Z86.718 PERSONAL HISTORY OF VENOUS THROMBOSIS AND EMBOLISM: ICD-10-CM

## 2022-12-06 LAB — INR BLD: 2.5 (ref 0.9–1.1)

## 2022-12-06 PROCEDURE — 36415 COLL VENOUS BLD VENIPUNCTURE: CPT

## 2022-12-06 PROCEDURE — 86803 HEPATITIS C AB TEST: CPT

## 2022-12-06 PROCEDURE — 85610 PROTHROMBIN TIME: CPT

## 2022-12-06 NOTE — PROGRESS NOTES
ANTICOAGULATION MANAGEMENT     Alvin Ontiveros 67 year old male is on warfarin with therapeutic INR result. (Goal INR 2.0-3.0)    Recent labs: (last 7 days)     12/06/22  1505   INR 2.5*       ASSESSMENT       Source(s): Chart Review and Patient/Caregiver Call       Warfarin doses taken: Less warfarin taken than planned which may be affecting INR    Diet: No new diet changes identified    New illness, injury, or hospitalization: No    Medication/supplement changes: None noted    Signs or symptoms of bleeding or clotting: No    Previous INR: Subtherapeutic    Additional findings: Parmjit utilized his previous maintenance dose after 11/22 visit, and did not increase as advised. Reported he did not trust the  and subtherapeutic INR reading. With his INR in range today, will allow him to stay with current dosing - reflected as a 7.7% change today.    Discussed with Parmjit the potential to more evenly distribute his 2.5mg days over the course of the week. He would like to keep dosing as is right now, and is open to continuing the conversation in the future if unable to achieve therapeutic INR.       PLAN     Recommended plan for ongoing change(s) affecting INR     Dosing Instructions: Continue your current warfarin dose with next INR in 3 weeks       Summary  As of 12/6/2022    Full warfarin instructions:  2.5 mg every Sun, Fri; 5 mg all other days; Starting 12/6/2022   Next INR check:  12/27/2022             Telephone call with Parmjit who verbalizes understanding and agrees to plan    Lab visit scheduled    Education provided:     Taking warfarin: Importance of taking warfarin as instructed and spreading doses out evenly over the course of the week    Plan made per [facility] anticoagulation protocol    Elizabeth Oakley RN  Anticoagulation Clinic  12/6/2022    _______________________________________________________________________     Anticoagulation Episode Summary     Current INR goal:  2.0-3.0   TTR:  87.5 % (1  y)   Target end date:  Indefinite   Send INR reminders to:  [facility]    Indications    Long-term (current) use of anticoagulants [Z79.01] [Z79.01]  Heterozygous factor V Leiden mutation (H) [D68.51]  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

## 2022-12-06 NOTE — PATIENT INSTRUCTIONS
December 7, 2022       Gael Gracia MD  [address]  [place] [address]  Via In Basket      Patient: Tyrone Hawkins   YOB: 1949   Date of Visit: 12/6/2022       Dear Dr. Gracia:    Thank you for referring Tyrone Hawkins to me for evaluation. Below are my notes for this visit with him.    If you have questions, please do not hesitate to call me. I look forward to following your patient along with you.      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 Has had stable AM cortisol.      Central Hypothyroidism  -taking LT4 150mcg daily, 09/2020 FT4 is within range    -following FT4 levels   -taking medication correctly  -denies any hypothyroid symptoms at this time      Central hypogonadism   -initially on androgel but switched to IM injections 2/2 concerns for transfer to wife   -taking IM testosterone 200mg Q 3 weeks, administered in PCP clinic,   -01/2022 level was LOW because it was done 4 weeks after last injection dose  -history of CPAP and is compliant just not a fan of the nose mask   -Hypogonadism ROS  low energy: no  low libido: no  sleep apnea: yes, using cpap  able to grow chest hair/facial hair: yes   muscle strength: good   history of steroid use: no  history of narcotic use: no  LUTS symptoms: still with urgency flomax and oxybutynin, sees Dr Horton from urology   PSA  Everything looks fine!    Refills of medications have been faxed to your pharmacy.     See me in six months, with fasting labs a few days in advance, sooner if problems.     slightly uptrending    Normal DXA 2014, no history of fractures  Normal DXA 2022, no history of fractures       AM cortisol in  13.7 (remained this steady) Patient without any symptoms of AI, BP is WNL, denies any N/V, abd pain, unintentional weight loss      MRI 10/2014 which showed residual pituitary adenoma roughly measuring 1.8 x 1.0 x 1.6cm, that is abutting R cavernous sinus. Denies any visual symptoms. Last Neurosurgery visit in 2017.   CT imaging in 11/2017 with CT head that showed stable 1.8cm pituitary adenoma (not compressing optic chiasm). pt without HA, tunnel vision symptoms.   CT imaging in 09/2019 showed slight interval increase in size of residual pituitary macroadenoma from 1.5 x 2.0 x 1.4 cm to 1.5 x 1.7 x 1.4 cm MRI 09/2020 A solidly enhancing sellar mass, asymmetrically on the right and extending into the pituitary stalk, has increased in size with the cerebellar mass component measuring 1.9 cm AP x 2.2 cmTV x 1.5 cm CC, previously 1.8 x 1.7 x 1.2 cm on 12/11/2014    Has a pacemaker and so that is why we were doing CTV, also was told weight limit for MRI exceeded his weight. Pacemaker inserted about 5 years ago      Ophelin Wood at [facility] , last seen in 06/2019     from opho for patient, last VF testing was done in 06/2018. per note, states that VF OD stable but OS is worse. Patient could not appreciate this in day to day living.     03/2019 lost about 10 lbs. Has less of an appetite. States his wife has been distant, especially with lack of sexual intimacy and so he feels like he might have some depression. No SI/HI. No nausea, vomiting     Saw cards recently, told he had 2 episodes of afib, started on xarelto    11/2019 saw NSGY     09/2020, reports he is getting VF testing every 6 months, on and off libido, has mild depression symptoms, no nausea or vomiting, no polyuria polydipsia. On LT4 150 mcg daily, on IM 200mg every 21 days. Tolerating well     05/2021 MRI brain Diffuse  enlargement of the pituitary gland is unchanged measuring 1.8 x 1.4  x 2.2 cm protruding into the suprasellar cistern and right cavernous sinus. Infundibulum remains thickened and slightly deviated to the left. There is no mass effect on the optic chiasm. The mass abuts the cavernous rightinternal carotid artery without stenosis    2022 has lost about 15 lbs since , intentionally eating less. Having a lot of back pain, possibly from sciatica. Getting VF testing every 6 months. Compliant with LT4 150 mcg daily and IM testosterone 200mg q3 weeks (gets done in office). No HA, no vision changes.    2022 MRI brain FINDINGS:   There is a grossly stable 2.3 x 1.5 x 1.8 cm ( prior 2.2 x 1.4 x 1.8cm)  hypoenhancing mass within the right side of the pituitary gland bulging into the suprasellar cistern nearly abutting the optic chiasm.  There is slight extension into the right side of the cavernous sinus.  This is  suggestive of a pituitary macroadenoma.  The pituitary infundibulum isthickened, unchanged.  Bilateral internal carotid arteries are patent.    Today, patient is in a lot of emotional distress. Reports his wife is planning to leave him. His brother just  and son was arrested recently. Reports normal visual field deficit in the past. No HA or tunnel vision   Taking his medications as directed          PMH/PSH  JOHNIE CPAP, compliant   Pituitary macroadenoma s/p partial resection   Central hypogonadism and hypothyroidism   Cardiac pacemaker  PAF  Copd  Appendectomy    FH  Dementia    Social Hx  Negative for etoh  Negative for illicits  Negative for tobacco  Retired, age 62        ALLERGIES:  No Known Allergies      ROS  A 12 Point ROS was negative except that listed in the HPI    Meds  Current Outpatient Medications   Medication Sig   • levothyroxine 150 MCG tablet TAKE 1 TABLET EVERY DAY   • testosterone cypionate (DEPO-TESTOSTERONE) 200 MG/ML injectable solution INJECT 1ML INTRAMUSCULARLY EVERY 21  DAYS (VIAL IS FOR SINGLE USE ONLY)   • acetaminophen (TYLENOL) 325 MG tablet Take 325 mg by mouth as needed for Pain.   • Multiple Vitamin (vitamin - therapeutic multivitamin) capsule Take 1 capsule by mouth.   • lisinopril (ZESTRIL) 30 MG tablet Take 1 tablet by mouth daily.   • dorzolamide (TRUSOPT) 2 % ophthalmic solution Place 1 drop into both eyes 2 times daily.    • oxybutynin (DITROPAN XL) 15 MG 24 hr tablet Take 15 mg by mouth daily.   • XARELTO 20 MG Tab Take 20 mg by mouth daily (with dinner).    • tamsulosin (FLOMAX) 0.4 MG Cap Take 0.4 mg by mouth daily.    • latanoprost (XALATAN) 0.005 % ophthalmic solution Place 1 drop into both eyes nightly.    • dorzolamide-timolol (COSOPT) 22.3-6.8 MG/ML ophthalmic solution Apply to eye every 12 hours.     Current Facility-Administered Medications   Medication   • testosterone cypionate (DEPO-TESTOSTERONE) 200 MG/ML injection 200 mg       Exam  Visit Vitals  /70   Wt (!) 155.1 kg (341 lb 14.9 oz)   BMI 51.99 kg/m²     Vitals Reviewed  General NAD, NCAT, obese  HEENT EOMI no thyromegaly, no thyroid nodules palpable no exophthalmos   CV RRR S1S2  Resp CTA bilaterally, no wheezes rales rhonchi  Abd soft nontender nondistended  Ext +LE edema   Skin no rashes, no ecchymoses, +facial hair  Neuro  alert and oriented x 3   Psych cooperative noncombative    Labs/Diagnostics   -Reviewed in Epic  Component      Latest Ref Rng & Units 1/5/2022   Sodium      135 - 145 mmol/L 140   Potassium      3.4 - 5.1 mmol/L 4.2   Chloride      98 - 107 mmol/L 105   CO2      21 - 32 mmol/L 29   ANION GAP      10 - 20 mmol/L 10   Glucose      70 - 99 mg/dL 88   BUN      6 - 20 mg/dL 21 (H)   Creatinine      0.67 - 1.17 mg/dL 1.25 (H)   Glomerular Filtration Rate      >=60 66   BUN/CREATININE RATIO      7 - 25 17   CALCIUM      8.4 - 10.2 mg/dL 9.7   TOTAL BILIRUBIN      0.2 - 1.0 mg/dL 0.8   AST/SGOT      <=37 Units/L 30   ALT/SGPT      <64 Units/L 45   ALK PHOSPHATASE      45 - 117  Units/L 45   Albumin      3.6 - 5.1 g/dL 3.8   TOTAL PROTEIN      6.4 - 8.2 g/dL 6.5   GLOBULIN      2.0 - 4.0 g/dL 2.7   A/G Ratio, Serum      1.0 - 2.4 1.4   Testosterone Male      280.0-1,100.0 ng/dL 189.6 (L)   SEX HORMONE BINDING GLOBULIN      11 - 80 nmol/L 33   TESTOSTERONE, FREE      47 - 244 pg/mL 34 (L)   Testosterone, % Free      1.6 - 2.9 % 1.8   Testosterone, Bioavailable      131 - 682 ng/dL 92 (L)   T4, FREE      0.8 - 1.5 ng/dL 1.1   PSA, Total      <=6.50 ng/mL 2.40   PROLACTIN      2.1 - 17.7 ng/mL 10.8   CORTISOL, AM      5.2 - 22.5 mcg/dL 13.7         MRI pituitary 09/2020  A solidly enhancing sellar mass, asymmetrically on the right and extending into the pituitary   stalk, has increased in size with the cerebellar mass component measuring 1.9 cm AP x 2.2 cm   TV x 1.5 cm CC, previously 1.8 x 1.7 x 1.2 cm on 12/11/2014..  The pituitary stalk component is   unchanged measuring 6 mm in width.  The mass extends into the right cavernous sinus and abuts   the cavernous right internal carotid artery without stenosis.  There is no invasion into the   left cavernous sinus.  The sellar mass component protrudes into the suprasellar cistern but does   not abut the optic chiasm.     The scalp and calvarium are normal. The superior sagittal sinus demonstrates normal venous   flow. The corpus callosum is normal in shape and signal intensity. The posterior fossa is   unremarkable. The brainstem and craniocervical junction are unremarkable.     There is mild cerebral volume loss with associated ex vacuo ventricular dilatation.     There is moderate paranasal sinus disease with air-fluid level in the left maxillary and right   sphenoid sinuses.  Otherwise, the visualized portions of the orbits, paranasal sinuses, and   mastoids appear normal.  IMPRESSION:     Increased size of a sellar mass compared to the study from 12/11/2014. The mass invades the   right cavernous sinus and abuts the right cavernous internal  carotid artery without stenosis.    Unchanged thickening and enhancement of the pituitary stalk.  The mass protrudes into the   suprasellar cistern but does not abut the optic chiasm.  Given involvement of the pituitary   stalk, primary diagnostic neoplastic considerations include pituitary macroadenoma, pituicytoma,    Assessment/Plan  Nonfunctioning pituitary adenoma s/p TSR in 09/2012 with residual tumor present and development of subsequent central hypogonadism and central hypothyroidism  -based on testosterone in 01/2022, lab was drawn at the trough between injections, so that is why the level was low, continue same testosterone dose IM 200mg q 3 weeks, to check levels midpoint between injections, testosterone gets mailed to patient and he brings med to clinic for injections    -ok to continue same hormone doses   -due for repeat labs   -HCT/LFTs stable, continue CPAP  -continue LT4 150 mcg daily since last FT4 is at mid to ULN, will not use TSH to monitor treatment as this will be low in patient always and may be confused with hyperthyroidism, will only use FT4 to monitor  -AM cortisol is ok, no AI symptoms and BP was stable previusly, continue to monitor with AM cortisol levels for now  -check PRL AM cortisol FT4 testosterone level   -up to date with DXA, no need for repeat at this time  -advised for OTC D3 1000 units daily   -continue cpap  -has seen NSGY in 11/2019, plan for follow up in 6 months with serial imaging. and neuroophtho regularly and send me the office notes from last one year  -MRI in 09/2020  With slight increase in size but no effect on optic chiasm    -05/2021 MRI brain - unchanged   -11/2022 MRI shows abuttment of optic chiasm - I did reach out to NSGY to see if patient needs to be evaluated by them due to this   -advised if patient ever gets HA out of proportion, then to go to the ER    CT head  -05/2021 MRI brain - unchanged   -has some VF deficit chronically  -continue follow up with  NSGY, Dr Le, initial surgery was done by Dr DAYTON Payne     Rising PSA, will need to be monitored to see if >1.0 increase in 1 year   -if so, may need referral to urology    discussed labs and plan with patient     RTC in 1 year, due for repeat labs       Thank you, Dr. Anila Myers MD, for allowing me to participate in the care of this patient!

## 2022-12-07 LAB — HCV AB SERPL QL IA: NONREACTIVE

## 2022-12-09 NOTE — PROGRESS NOTES
S: Patient is a 67 year old, R hand dominant male seen today in consultation for hand masses and L wrist pain.  They report onset of symptoms of L wrist stiffness and pain. no injury.   Pain is located left wrist.  Family hx of same issues:  Mother, brother, possibly other family members.  Has been on coumadin for many years related to previous PE.  Also was a smoker years ago but stopped more than 15 years ago.      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     Heterozygous factor V Leiden mutation (H)     Heterozygous Prothrombin Gene Mutation     HYPERLIPIDEMIA LDL GOAL <130     Prediabetes     Advanced directives, counseling/discussion     Long-term (current) use of anticoagulants [Z79.01]     Benign neoplasm of colon, unspecified part of colon     Primary osteoarthritis of left knee     Alcohol dependence in remission (H)     Pulmonary embolism (H)     Primary hypercoagulable state (H)     Hiatal hernia            Past Medical History:   Diagnosis Date     Chest pain, unspecified     Abstracted 05/17/02     Esophageal reflux     Abstracted 05/17/02     Gastro-oesophageal reflux disease      Hypertension      Other and unspecified alcohol dependence, unspecified drinking behavior     Abstracted 05/17/02     Other and unspecified coagulation defects 9/27/2006    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     Personal history of venous thrombosis and embolism      Pneumonia, organism unspecified(486) 1/2006    Hospitalized 1/2006 with pleuritic chest pain     Pulmonary embolism (H) 2/2006     Thrombosis of leg 2005     TINY PULMONARY NODULES, PRESUMED BENIGN 9/27/2006     Tobacco use disorder     Abstracted 05/17/02            Past Surgical History:   Procedure Laterality Date     C UNLISTED  PROCEDURE, ABDOMEN/PERITONEUM/OMENTUM      Description: Hernia Repair;  Recorded: 2008;     COLONOSCOPY  2016    One adenomatous polyp removed by Ariana Floyd, [facility], repeat in      HC REMOVAL OF TONSILS,<13 Y/O      Tonsils <11y.o.     HC REMOVAL OF TONSILS,<13 Y/O      Description: Tonsillectomy;  Recorded: 2008;     HC REPAIR INCISIONAL HERNIA,REDUCIBLE  2004    Hernia Repair, Incisional, Unilateral, also umbilical hernia repair     REMOVE FOREIGN BODY HAND  2013    Procedure: REMOVE FOREIGN BODY HAND;  Left Thumb Foreign Body Removal ;  Surgeon: Blaine Willis MD;  Location:  OR     [facility] [place] COLONOSCOPY THRU STOMA, DIAGNOSTIC  2006    Normal--repeat in 10 years.            Social History     Tobacco Use     Smoking status: Former     Packs/day: 1.00     Years: 30.00     Pack years: 30.00     Types: Cigarettes     Quit date: 2005     Years since quittin.0     Smokeless tobacco: Never   Substance Use Topics     Alcohol use: Yes            Family History   Problem Relation Age of Onset     Family History Negative Mother         Born 1923. Lives in assisted living.     Heart Disease Father          age 86. MI at age 67, again at age 80.      Gastrointestinal Disease Father         PUD requiring surgery in the 's.      Family History Negative Sister      Family History Negative Sister      Family History Negative Sister          in MVA at 11 yo     Cerebrovascular Disease Brother         Born 6. Stroke at age 8 yrs old. Has reduced use of his right arm.      Family History Negative Brother          in infancy               Allergies   Allergen Reactions     Ace Inhibitors      reaction unknown              Current Outpatient Medications   Medication Sig Dispense Refill     folic acid (FOLVITE) 1 MG tablet Take 1 tablet (1,000 mcg) by mouth daily 90 tablet 3     hydrochlorothiazide (HYDRODIURIL) 25 MG tablet Take 1  tablet (25 mg) by mouth daily 90 tablet 3     levothyroxine (SYNTHROID/LEVOTHROID) 75 MCG tablet TAKE 1 TABLET(75 MCG) BY MOUTH DAILY 90 tablet 3     losartan (COZAAR) 100 MG tablet Take 1 tablet (100 mg) by mouth daily 90 tablet 3     omeprazole (PRILOSEC) 20 MG DR capsule Take 1 capsule (20 mg) by mouth daily 90 capsule 3     pravastatin (PRAVACHOL) 40 MG tablet TAKE 1 TABLET BY MOUTH  DAILY AT BEDTIME 90 tablet 3     sildenafil (VIAGRA) 100 MG tablet Take 1 tablet (100 mg) by mouth daily as needed (for erectile dysfunction) 12 tablet 11     sildenafil (VIAGRA) 100 MG tablet Take 1 tablet (100 mg) by mouth daily as needed (erectile dysfunction) (Patient not taking: Reported on 11/22/2022) 6 tablet 11     warfarin ANTICOAGULANT (JANTOVEN ANTICOAGULANT) 5 MG tablet TAKE 1 TABLET DAILY EXCEPT TAKE 1/2 TABLET SUNDAY AND FRIDAY OR AS INSTRUCTED BASED ON INTERNATIONAL NORMALIZED RATIO RESULTS 90 tablet 3          Review Of Systems  Skin: negative  Eyes: negative  Ears/Nose/Throat: negative  Respiratory: No shortness of breath, dyspnea on exertion, cough, or hemoptysis    O: Physical Exam:  Palpable mass volar to L ring MCP, palpable mass to a lesser degree R long MCP volar region.  Consistent with palmar fibromatosis.  No finger flexion contracture either hand, no pain to palpation and no report of other symptoms.  CMS intact both hands.  L wrist somewhat boggy compared to RUE.  Adequate wrist motion with occasional clunk but no crepitus.  Some discomfort to palpation distal to vannesa's tubercle.    Labs:  No inflammatory markers or arthritic profile.    Images:     INDICATION: Left wrist pain.  COMPARISON: None.                                                                      IMPRESSION: Normal joint spacing and alignment. No fracture.  Atherosclerotic calcification.         A: Atherosclerosis wrist/forearm vessels discussed with patient.  Possible inflammatory arthropathy.  Dupuytren's contractures L ring and R  long MCP regions without flexion contractures either hand.    P:  Discussed injection of bands but also discussed that there is a high recurrence rate.  Also discussed steroid injection if palmar fibromatosis painful, which it is not.  Also discussed surgical intervention and indication, but no contractures at this point  So will just monitor.  Did proceed with left wrist joint injection just distal to vannesa's tubercle.  After verbal and written consent, ethyl chloride, chloroprep injected L wrist joint.  Will follow outcomes  See back 2-3 mos  Obtain inflammatory markers and arthritic profile  Will notify if exacerbation symptoms.           In addition to the above assessment and plan each active problem on Alvin's problem list was evaluated today. This included the questioning of Alvin for any medication problems. We will continue the current treatment plan for these active problems except as noted.

## 2022-12-13 ENCOUNTER — ANCILLARY PROCEDURE (OUTPATIENT)
Dept: GENERAL RADIOLOGY | Facility: CLINIC | Age: 67
End: 2022-12-13
Attending: ORTHOPAEDIC SURGERY
Payer: COMMERCIAL

## 2022-12-13 ENCOUNTER — OFFICE VISIT (OUTPATIENT)
Dept: ORTHOPEDICS | Facility: CLINIC | Age: 67
End: 2022-12-13
Attending: INTERNAL MEDICINE
Payer: COMMERCIAL

## 2022-12-13 VITALS — DIASTOLIC BLOOD PRESSURE: 82 MMHG | BODY MASS INDEX: 31.33 KG/M2 | WEIGHT: 231 LBS | SYSTOLIC BLOOD PRESSURE: 138 MMHG

## 2022-12-13 DIAGNOSIS — M25.532 LEFT WRIST PAIN: ICD-10-CM

## 2022-12-13 DIAGNOSIS — M19.039 WRIST ARTHRITIS: ICD-10-CM

## 2022-12-13 DIAGNOSIS — M72.0 DUPUYTREN'S CONTRACTURE OF BOTH HANDS: Primary | ICD-10-CM

## 2022-12-13 DIAGNOSIS — M72.0 DUPUYTREN'S CONTRACTURE OF BOTH HANDS: ICD-10-CM

## 2022-12-13 PROCEDURE — 20605 DRAIN/INJ JOINT/BURSA W/O US: CPT | Performed by: ORTHOPAEDIC SURGERY

## 2022-12-13 PROCEDURE — 99203 OFFICE O/P NEW LOW 30 MIN: CPT | Mod: 25 | Performed by: ORTHOPAEDIC SURGERY

## 2022-12-13 PROCEDURE — 73110 X-RAY EXAM OF WRIST: CPT | Mod: TC | Performed by: RADIOLOGY

## 2022-12-13 RX ORDER — LIDOCAINE HYDROCHLORIDE 10 MG/ML
2 INJECTION, SOLUTION INFILTRATION; PERINEURAL
Status: SHIPPED | OUTPATIENT
Start: 2022-12-13

## 2022-12-13 RX ORDER — BUPIVACAINE HYDROCHLORIDE 5 MG/ML
0.5 INJECTION, SOLUTION PERINEURAL
Status: SHIPPED | OUTPATIENT
Start: 2022-12-13

## 2022-12-13 RX ORDER — TRIAMCINOLONE ACETONIDE 40 MG/ML
40 INJECTION, SUSPENSION INTRA-ARTICULAR; INTRAMUSCULAR
Status: SHIPPED | OUTPATIENT
Start: 2022-12-13

## 2022-12-13 RX ADMIN — LIDOCAINE HYDROCHLORIDE 2 ML: 10 INJECTION, SOLUTION INFILTRATION; PERINEURAL at 11:07

## 2022-12-13 RX ADMIN — TRIAMCINOLONE ACETONIDE 40 MG: 40 INJECTION, SUSPENSION INTRA-ARTICULAR; INTRAMUSCULAR at 11:07

## 2022-12-13 RX ADMIN — BUPIVACAINE HYDROCHLORIDE 0.5 ML: 5 INJECTION, SOLUTION PERINEURAL at 11:07

## 2022-12-13 NOTE — PROGRESS NOTES
Patient is a 67 year old, right hand dominant male seen today in consultation for right and left wrist/duputryens  .  They report onset of symptoms 1 year in [place] . - family history of disease  Wrist pain about 1 month   Pain is located wrist , and described as sharp pain in the wrist.  .  . .  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     Medium Joint Injection/Arthrocentesis    Date/Time: 12/13/2022 11:07 AM  Performed by: Du Almaraz MD  Authorized by: Du Almaraz MD     Indications:  Pain  Needle Size:  25 G  Location:  Wrist  Medications:  0.5 mL bupivacaine 0.5 %; 40 mg triamcinolone 40 MG/ML; 2 mL lidocaine 1 %  Outcome:  Tolerated well, no immediate complications  Consent Given by:  Patient  Timeout: timeout called immediately prior to procedure        I was present entire visit and performed injection.  Blaine Curtis MD

## 2022-12-13 NOTE — LETTER
12/13/2022         RE: Alvin Ontiveros  [address] [facility] [facility] [address]        Dear Colleague,    Thank you for referring your patient, Alvin Ontiveros, to the [facility] [place]. Please see a copy of my visit note below.    S: Patient is a 67 year old, R hand dominant male seen today in consultation for hand masses and L wrist pain.  They report onset of symptoms of L wrist stiffness and pain. no injury.   Pain is located left wrist.  Family hx of same issues:  Mother, brother, possibly other family members.  Has been on coumadin for many years related to previous PE.  Also was a smoker years ago but stopped more than 15 years ago.      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     Heterozygous factor V Leiden mutation (H)     Heterozygous Prothrombin Gene Mutation     HYPERLIPIDEMIA LDL GOAL <130     Prediabetes     Advanced directives, counseling/discussion     Long-term (current) use of anticoagulants [Z79.01]     Benign neoplasm of colon, unspecified part of colon     Primary osteoarthritis of left knee     Alcohol dependence in remission (H)     Pulmonary embolism (H)     Primary hypercoagulable state (H)     Hiatal hernia            Past Medical History:   Diagnosis Date     Chest pain, unspecified     Abstracted 05/17/02     Esophageal reflux     Abstracted 05/17/02     Gastro-oesophageal reflux disease      Hypertension      Other and unspecified alcohol dependence, unspecified drinking behavior     Abstracted 05/17/02     Other and unspecified coagulation defects 9/27/2006    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     Personal history of venous thrombosis and embolism      Pneumonia, organism unspecified(486) 1/2006     Hospitalized 2006 with pleuritic chest pain     Pulmonary embolism (H) 2006     Thrombosis of leg      TINY PULMONARY NODULES, PRESUMED BENIGN 2006     Tobacco use disorder     Abstracted 02            Past Surgical History:   Procedure Laterality Date     C UNLISTED PROCEDURE, ABDOMEN/PERITONEUM/OMENTUM      Description: Hernia Repair;  Recorded: 2008;     COLONOSCOPY  2016    One adenomatous polyp removed by Ariana Floyd, [facility], repeat in      HC REMOVAL OF TONSILS,<13 Y/O      Tonsils <11y.o.     HC REMOVAL OF TONSILS,<12 Y/O      Description: Tonsillectomy;  Recorded: 2008;     HC REPAIR INCISIONAL HERNIA,REDUCIBLE  2004    Hernia Repair, Incisional, Unilateral, also umbilical hernia repair     REMOVE FOREIGN BODY HAND  2013    Procedure: REMOVE FOREIGN BODY HAND;  Left Thumb Foreign Body Removal ;  Surgeon: Blaine Willis MD;  Location:  OR     [facility] COLONOSCOPY THRU STOMA, DIAGNOSTIC  2006    Normal--repeat in 10 years.            Social History     Tobacco Use     Smoking status: Former     Packs/day: 1.00     Years: 30.00     Pack years: 30.00     Types: Cigarettes     Quit date: 2005     Years since quittin.0     Smokeless tobacco: Never   Substance Use Topics     Alcohol use: Yes            Family History   Problem Relation Age of Onset     Family History Negative Mother         Born 1923. Lives in assisted living.     Heart Disease Father          age 86. MI at age 67, again at age 80.      Gastrointestinal Disease Father         PUD requiring surgery in the 's.      Family History Negative Sister      Family History Negative Sister      Family History Negative Sister          in MVA at 13 yo     Cerebrovascular Disease Brother         Born . Stroke at age 8 yrs old. Has reduced use of his right arm.      Family History Negative Brother          in infancy               Allergies    Allergen Reactions     Ace Inhibitors      reaction unknown              Current Outpatient Medications   Medication Sig Dispense Refill     folic acid (FOLVITE) 1 MG tablet Take 1 tablet (1,000 mcg) by mouth daily 90 tablet 3     hydrochlorothiazide (HYDRODIURIL) 25 MG tablet Take 1 tablet (25 mg) by mouth daily 90 tablet 3     levothyroxine (SYNTHROID/LEVOTHROID) 75 MCG tablet TAKE 1 TABLET(75 MCG) BY MOUTH DAILY 90 tablet 3     losartan (COZAAR) 100 MG tablet Take 1 tablet (100 mg) by mouth daily 90 tablet 3     omeprazole (PRILOSEC) 20 MG DR capsule Take 1 capsule (20 mg) by mouth daily 90 capsule 3     pravastatin (PRAVACHOL) 40 MG tablet TAKE 1 TABLET BY MOUTH  DAILY AT BEDTIME 90 tablet 3     sildenafil (VIAGRA) 100 MG tablet Take 1 tablet (100 mg) by mouth daily as needed (for erectile dysfunction) 12 tablet 11     sildenafil (VIAGRA) 100 MG tablet Take 1 tablet (100 mg) by mouth daily as needed (erectile dysfunction) (Patient not taking: Reported on 11/22/2022) 6 tablet 11     warfarin ANTICOAGULANT (JANTOVEN ANTICOAGULANT) 5 MG tablet TAKE 1 TABLET DAILY EXCEPT TAKE 1/2 TABLET SUNDAY AND FRIDAY OR AS INSTRUCTED BASED ON INTERNATIONAL NORMALIZED RATIO RESULTS 90 tablet 3          Review Of Systems  Skin: negative  Eyes: negative  Ears/Nose/Throat: negative  Respiratory: No shortness of breath, dyspnea on exertion, cough, or hemoptysis    O: Physical Exam:  Palpable mass volar to L ring MCP, palpable mass to a lesser degree R long MCP volar region.  Consistent with palmar fibromatosis.  No finger flexion contracture either hand, no pain to palpation and no report of other symptoms.  CMS intact both hands.  L wrist somewhat boggy compared to RUE.  Adequate wrist motion with occasional clunk but no crepitus.  Some discomfort to palpation distal to vannesa's tubercle.    Labs:  No inflammatory markers or arthritic profile.    Images:     INDICATION: Left wrist pain.  COMPARISON: None.                                                                       IMPRESSION: Normal joint spacing and alignment. No fracture.  Atherosclerotic calcification.         A: Atherosclerosis wrist/forearm vessels discussed with patient.  Possible inflammatory arthropathy.  Dupuytren's contractures L ring and R long MCP regions without flexion contractures either hand.    P:  Discussed injection of bands but also discussed that there is a high recurrence rate.  Also discussed steroid injection if palmar fibromatosis painful, which it is not.  Also discussed surgical intervention and indication, but no contractures at this point  So will just monitor.  Did proceed with left wrist joint injection just distal to vannesa's tubercle.  After verbal and written consent, ethyl chloride, chloroprep injected L wrist joint.  Will follow outcomes  See back 2-3 mos  Obtain inflammatory markers and arthritic profile  Will notify if exacerbation symptoms.           In addition to the above assessment and plan each active problem on Alvin's problem list was evaluated today. This included the questioning of Alvin for any medication problems. We will continue the current treatment plan for these active problems except as noted.      Patient is a 67 year old, right hand dominant male seen today in consultation for right and left wrist/duputryens  .  They report onset of symptoms 1 year in [place] . - family history of disease  Wrist pain about 1 month   Pain is located wrist , and described as sharp pain in the wrist.  .  . .  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     Medium Joint Injection/Arthrocentesis    Date/Time: 12/13/2022 11:07 AM  Performed by: Du Almaraz MD  Authorized by: Du Almaraz MD     Indications:  Pain  Needle Size:  25 G  Location:  Wrist  Medications:  0.5 mL bupivacaine 0.5 %; 40 mg triamcinolone 40 MG/ML; 2 mL lidocaine 1 %  Outcome:   Tolerated well, no immediate complications  Consent Given by:  Patient  Timeout: timeout called immediately prior to procedure        I was present entire visit and performed injection.  Chava Aguirre MD      Again, thank you for allowing me to participate in the care of your patient.        Sincerely,        CHAVA AGUIRRE MD

## 2022-12-13 NOTE — PATIENT INSTRUCTIONS
Thank you for choosing [facility]    Dr. Curtis Locations:    [facility] [place]   [address] [place] [address], [address]  [place], [place] [address] [place] [id number]   Appointments: [phone] Appointments: [phone]   Fax: [phone] Fax: [phone]       Follow up: 2-3 months Please call [phone] to schedule your follow up appointment.       Steroid injection of the left wrist was performed today in clinic    - Would not soak in a hot tub, bath or swimming pool for 48 hours  - Ok to shower  - Ice today and only do your normal amounts of activity  - The lidocaine (what is giving you pain relief right now) will likely stop working in 1-2 hours.  You will then have pain again, similar to before you received the injection. The corticosteroid will not start working until approximately 1-2 weeks from now.  In a small percentage of people, cortisone can cause flushing/redness in the face. This usually lasts for 1-3 days and resolves. Cool compress and Ibuprofen/Tylenol can help if this happens.        For any questions please contact my office, [phone].

## 2022-12-27 ENCOUNTER — LAB (OUTPATIENT)
Dept: LAB | Facility: CLINIC | Age: 67
End: 2022-12-27
Payer: COMMERCIAL

## 2022-12-27 ENCOUNTER — ANTICOAGULATION THERAPY VISIT (OUTPATIENT)
Dept: ANTICOAGULATION | Facility: CLINIC | Age: 67
End: 2022-12-27

## 2022-12-27 DIAGNOSIS — Z86.718 PERSONAL HISTORY OF VENOUS THROMBOSIS AND EMBOLISM: ICD-10-CM

## 2022-12-27 DIAGNOSIS — D68.51 HETEROZYGOUS FACTOR V LEIDEN MUTATION (H): ICD-10-CM

## 2022-12-27 DIAGNOSIS — Z79.01 LONG TERM CURRENT USE OF ANTICOAGULANT THERAPY: ICD-10-CM

## 2022-12-27 DIAGNOSIS — D68.52 PROTHROMBIN GENE MUTATION (H): ICD-10-CM

## 2022-12-27 DIAGNOSIS — M19.039 WRIST ARTHRITIS: ICD-10-CM

## 2022-12-27 DIAGNOSIS — Z79.01 LONG TERM CURRENT USE OF ANTICOAGULANT THERAPY: Primary | ICD-10-CM

## 2022-12-27 LAB
CRP SERPL-MCNC: 3.38 MG/L
D DIMER PPP FEU-MCNC: <0.27 UG/ML FEU (ref 0–0.5)
ERYTHROCYTE [SEDIMENTATION RATE] IN BLOOD BY WESTERGREN METHOD: 9 MM/HR (ref 0–20)
INR BLD: 2.6 (ref 0.9–1.1)
URATE SERPL-MCNC: 5.6 MG/DL (ref 3.4–7)

## 2022-12-27 PROCEDURE — 84550 ASSAY OF BLOOD/URIC ACID: CPT

## 2022-12-27 PROCEDURE — 86225 DNA ANTIBODY NATIVE: CPT

## 2022-12-27 PROCEDURE — 36415 COLL VENOUS BLD VENIPUNCTURE: CPT

## 2022-12-27 PROCEDURE — 86140 C-REACTIVE PROTEIN: CPT

## 2022-12-27 PROCEDURE — 85610 PROTHROMBIN TIME: CPT

## 2022-12-27 PROCEDURE — 86200 CCP ANTIBODY: CPT

## 2022-12-27 PROCEDURE — 85379 FIBRIN DEGRADATION QUANT: CPT

## 2022-12-27 PROCEDURE — 86039 ANTINUCLEAR ANTIBODIES (ANA): CPT

## 2022-12-27 PROCEDURE — 86431 RHEUMATOID FACTOR QUANT: CPT

## 2022-12-27 PROCEDURE — 86038 ANTINUCLEAR ANTIBODIES: CPT

## 2022-12-27 PROCEDURE — 85652 RBC SED RATE AUTOMATED: CPT

## 2022-12-27 NOTE — PROGRESS NOTES
ANTICOAGULATION MANAGEMENT     Alvin Ontiveros 67 year old male is on warfarin with therapeutic INR result. (Goal INR 2.0-3.0)    Recent labs: (last 7 days)     12/27/22  1534   INR 2.6*       ASSESSMENT       Source(s): Chart Review and Patient/Caregiver Call       Warfarin doses taken: Warfarin taken as instructed    Diet: No new diet changes identified    New illness, injury, or hospitalization: No    Medication/supplement changes: None noted    Signs or symptoms of bleeding or clotting: No    Previous INR: Therapeutic last visit; previously outside of goal range    Additional findings: None       PLAN     Recommended plan for no diet, medication or health factor changes affecting INR     Dosing Instructions: Continue your current warfarin dose with next INR in 4 weeks       Summary  As of 12/27/2022    Full warfarin instructions:  2.5 mg every Sun, Fri; 5 mg all other days   Next INR check:  2/6/2023             Telephone call with Parmjit who verbalizes understanding and agrees to plan    Patient elected to schedule next visit 2/6/23    Education provided:     Please call back if any changes to your diet, medications or how you've been taking warfarin    Symptom monitoring: monitoring for bleeding signs and symptoms and monitoring for clotting signs and symptoms    Plan made per [facility] anticoagulation protocol    Corrie Freeman RN  Anticoagulation Clinic  12/27/2022    _______________________________________________________________________     Anticoagulation Episode Summary     Current INR goal:  2.0-3.0   TTR:  87.5 % (1 y)   Target end date:  Indefinite   Send INR reminders to:  [facility]    Indications    Long-term (current) use of anticoagulants [Z79.01] [Z79.01]  Heterozygous factor V Leiden mutation (H) [D68.51]  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]  Personal history of venous thrombosis and embolism [Z86.718]           Comments:           Anticoagulation Care Providers      Provider Role Specialty Phone number    Du Almaraz MD Referring Internal Medicine [phone]

## 2022-12-28 LAB
ANA PAT SER IF-IMP: ABNORMAL
ANA SER QL IF: POSITIVE
ANA TITR SER IF: ABNORMAL {TITER}
CCP AB SER IA-ACNC: 1.2 U/ML
DSDNA AB SER-ACNC: 87 IU/ML
RHEUMATOID FACT SER NEPH-ACNC: <6 IU/ML

## 2023-01-01 ENCOUNTER — TRANSFERRED RECORDS (OUTPATIENT)
Dept: MULTI SPECIALTY CLINIC | Facility: CLINIC | Age: 68
End: 2023-01-01

## 2023-01-01 LAB — RETINOPATHY: NORMAL

## 2023-01-10 NOTE — TELEPHONE ENCOUNTER
Prescription approved per [facility] Refill Protocol.  Kari Lizarraga RN     patient last seen 3-24-22    Noted follow up prn    ACE Inhibitor Refill Protocol - 12 Month Protocol Failed 01/09/2023 07:24 AM   Protocol Details  Normal Creatinine within last 12 months looking at last value    Seen by prescribing provider or same department within the last 12 months or has a future appt in 3 months - IF FAILED PLEASE LOOK AT CHART REVIEW FOR LAST VISIT AND PROCEED ACCORDINGLY    Last BP was under 140/90 or if patient has diabetes, CAD, or PVD, BP under 130/80 in past year -- IF CRITERIA FAILED REFER TO PROTOCOL DETAILS    Normal Potassium within last 12 months looking at last value    Medication (including dose and sig) on current meds list     Component      Latest Ref Rng & Units 3/24/2022 5/8/2022   BUN      6 - 20 mg/dL 16 23 (H)   Creatinine      0.51 - 0.95 mg/dL 0.94 1.03 (H)   Glomerular Filtration Rate      >=60 68 64     No labs scheduled

## 2023-01-26 ENCOUNTER — OFFICE VISIT (OUTPATIENT)
Dept: AUDIOLOGY | Facility: CLINIC | Age: 68
End: 2023-01-26
Payer: COMMERCIAL

## 2023-01-26 ENCOUNTER — OFFICE VISIT (OUTPATIENT)
Dept: OTOLARYNGOLOGY | Facility: CLINIC | Age: 68
End: 2023-01-26
Payer: COMMERCIAL

## 2023-01-26 VITALS
BODY MASS INDEX: 31.33 KG/M2 | WEIGHT: 231 LBS | DIASTOLIC BLOOD PRESSURE: 80 MMHG | HEART RATE: 71 BPM | SYSTOLIC BLOOD PRESSURE: 147 MMHG

## 2023-01-26 DIAGNOSIS — H91.93 BILATERAL HEARING LOSS, UNSPECIFIED HEARING LOSS TYPE: ICD-10-CM

## 2023-01-26 DIAGNOSIS — H90.3 ASYMMETRICAL SENSORINEURAL HEARING LOSS: Primary | ICD-10-CM

## 2023-01-26 DIAGNOSIS — H90.3 SENSORINEURAL HEARING LOSS, BILATERAL: Primary | ICD-10-CM

## 2023-01-26 PROCEDURE — 99203 OFFICE O/P NEW LOW 30 MIN: CPT | Performed by: OTOLARYNGOLOGY

## 2023-01-26 PROCEDURE — 92550 TYMPANOMETRY & REFLEX THRESH: CPT

## 2023-01-26 PROCEDURE — 92557 COMPREHENSIVE HEARING TEST: CPT

## 2023-01-26 NOTE — LETTER
1/26/2023         RE: Alvin Ontiveros  [address] [facility] [address] [facility] [address]        Dear Colleague,    Thank you for referring your patient, Alvin Ontiveros, to the [facility]. Please see a copy of my visit note below.    ENT Consultation    Alvin Ontiveros is a 67 year old male who is seen in consultation at the request of self.      History of Present Illness - Alvin Ontiveros is a 67 year old male presents for evaluation of hearing loss.  Patient has noticed the significant hearing loss years ago.  Denies any tinnitus vertigo or dizziness.  No significant family history of hearing loss.  Even though he did not work in loud environment he used to hunt and should a lot without protection.  He denies history of ear infections.  Until today he was not aware of any asymmetry in his hearing.  Past Medical History -   Past Medical History:   Diagnosis Date     Chest pain, unspecified     Abstracted 05/17/02     Esophageal reflux     Abstracted 05/17/02     Gastro-oesophageal reflux disease      Hypertension      Other and unspecified alcohol dependence, unspecified drinking behavior     Abstracted 05/17/02     Other and unspecified coagulation defects 9/27/2006    Heterozygous for both Factor V Leiden and Factor II [id number] gene mutations.     Personal history of venous thrombosis and embolism      Pneumonia, organism unspecified(486) 1/2006    Hospitalized 1/2006 with pleuritic chest pain     Pulmonary embolism (H) 2/2006     Thrombosis of leg 2005     TINY PULMONARY NODULES, PRESUMED BENIGN 9/27/2006     Tobacco use disorder     Abstracted 05/17/02       Current Medications -   Current Outpatient Medications:      folic acid (FOLVITE) 1 MG tablet, Take 1 tablet (1,000 mcg) by mouth daily, Disp: 90 tablet, Rfl: 3     hydrochlorothiazide (HYDRODIURIL) 25 MG tablet, Take 1 tablet (25 mg) by mouth daily, Disp: 90 tablet, Rfl: 3     levothyroxine (SYNTHROID/LEVOTHROID) 75 MCG  tablet, TAKE 1 TABLET(75 MCG) BY MOUTH DAILY, Disp: 90 tablet, Rfl: 3     losartan (COZAAR) 100 MG tablet, Take 1 tablet (100 mg) by mouth daily, Disp: 90 tablet, Rfl: 3     omeprazole (PRILOSEC) 20 MG DR capsule, Take 1 capsule (20 mg) by mouth daily, Disp: 90 capsule, Rfl: 3     pravastatin (PRAVACHOL) 40 MG tablet, TAKE 1 TABLET BY MOUTH  DAILY AT BEDTIME, Disp: 90 tablet, Rfl: 3     sildenafil (VIAGRA) 100 MG tablet, Take 1 tablet (100 mg) by mouth daily as needed (for erectile dysfunction), Disp: 12 tablet, Rfl: 11     sildenafil (VIAGRA) 100 MG tablet, Take 1 tablet (100 mg) by mouth daily as needed (erectile dysfunction), Disp: 6 tablet, Rfl: 11     warfarin ANTICOAGULANT (JANTOVEN ANTICOAGULANT) 5 MG tablet, TAKE 1 TABLET DAILY EXCEPT TAKE 1/2 TABLET  AND FRIDAY OR AS INSTRUCTED BASED ON INTERNATIONAL NORMALIZED RATIO RESULTS, Disp: 90 tablet, Rfl: 3    Current Facility-Administered Medications:      0.5 mL bupivacaine (MARCAINE) 0.5% injection (50 mL vial), 0.5 mL, , , Du Almaraz MD, 0.5 mL at 22 1107     lidocaine 1 % injection 2 mL, 2 mL, , , Du Almaraz MD, 2 mL at 22 1107     lidocaine 1 % injection 4 mL, 4 mL, , , Francisco Javier Álvarez MD, 4 mL at 10/03/18 1514     triamcinolone (KENALOG-40) injection 40 mg, 40 mg, , , Du Almaraz MD, 40 mg at 22 1107    Allergies -   Allergies   Allergen Reactions     Ace Inhibitors      reaction unknown         Social History -   Social History     Socioeconomic History     Marital status:      Spouse name: Maryam     Number of children: 5     Years of education: None     Highest education level: None   Occupational History     Occupation: Sales      Employer: [employer]   Tobacco Use     Smoking status: Former     Packs/day: 1.00     Years: 30.00     Pack years: 30.00     Types: Cigarettes     Quit date: 2005     Years since quittin.1     Smokeless tobacco: Never   Vaping Use     Vaping Use: Never used   Substance  and Sexual Activity     Alcohol use: Yes     Drug use: No     Sexual activity: Yes     Partners: Female   Social History Narrative    , three biologic children, two stepchildren    All five children living in town.     Nine grandchildren.     Works in sales for [employer], anticipates assisted in 2023     Social Determinants of Health     Financial Resource Strain: Low Risk      Difficulty of Paying Living Expenses: Not hard at all   Food Insecurity: No Food Insecurity     Worried About Running Out of Food in the Last Year: Never true     Ran Out of Food in the Last Year: Never true   Transportation Needs: No Transportation Needs     Lack of Transportation (Medical): No     Lack of Transportation (Non-Medical): No   Physical Activity: Sufficiently Active     Days of Exercise per Week: 5 days     Minutes of Exercise per Session: 40 min   Intimate Partner Violence: Not At Risk     Fear of Current or Ex-Partner: No     Emotionally Abused: No     Physically Abused: No     Sexually Abused: No   Housing Stability: Low Risk      Unable to Pay for Housing in the Last Year: No     Number of Places Lived in the Last Year: 1     Unstable Housing in the Last Year: No       Family History -   Family History   Problem Relation Age of Onset     Family History Negative Mother         Born . Lives in assisted living.     Heart Disease Father          age 86. MI at age 67, again at age 80.      Gastrointestinal Disease Father         PUD requiring surgery in the 's.      Family History Negative Sister      Family History Negative Sister      Family History Negative Sister          in MVA at 13 yo     Cerebrovascular Disease Brother         Born . Stroke at age 8 yrs old. Has reduced use of his right arm.      Family History Negative Brother          in infancy       Review of Systems - As per HPI and PMHx, otherwise review of system review of the head and neck negative. Otherwise 10+ review systems  negative.    Physical Exam  BP (!) 147/80   Pulse 71   Wt 104.8 kg (231 lb)   BMI 31.33 kg/m    BMI: Body mass index is 31.33 kg/m .    General - The patient is well nourished and well developed, and appears to have good nutritional status.  Alert and oriented to person and place, answers questions and cooperates with examination appropriately.    SKIN - No suspicious lesions or rashes.  Respiration - No respiratory distress.  Head and Face - Normocephalic and atraumatic, with no gross asymmetry noted of the contour of the facial features.  The facial nerve is intact, with strong symmetric movements.    Voice and Breathing - The patient was breathing comfortably without the use of accessory muscles. The patients voice was clear and strong, and had appropriate pitch and quality.    Ears - Bilateral pinna and EACs with normal appearing overlying skin. Tympanic membrane intact with good mobility on pneumatic otoscopy bilaterally. Bony landmarks of the ossicular chain are normal. The tympanic membranes are normal in appearance. No retraction, perforation, or masses.  No fluid or purulence was seen in the external canal or the middle ear.     Eyes - Extraocular movements intact.  Sclera were not icteric or injected, conjunctiva were pink and moist.        Neuro - Nonfocal neuro exam is normal, CN 2 through 12 intact, normal gait and muscle tone.      Performed in clinic today:  Audiologic Studies - An audiogram and tympanogram were performed today as part of the evaluation and personally reviewed. The tympanogram shows Type A curves on the right and Type A curves on the left, with Normal canal volumes and middle ear pressures.  The audiogram showed Mild to severe sloping mid to high-frequency hearing loss on the right and Mild to severe sloping SNHLon the left.    Word recognition score 96% on the right and 68% on the left.    A/P - Alvin Ontiveros is a 67 year old male with asymmetric sensorineural hearing loss  bilaterally but in the absence of tinnitus dizziness or vertigo.  We discussed small possibility of retrocochlear pathology such as acoustic neuroma.  He is not interested in imaging at this point understanding slightly increased chance in the setting of asymmetry.  Therefore hearing protection and amplification is strongly encouraged.  Patient will recheck his hearing in 1 year.      Jim Alexander MD      Again, thank you for allowing me to participate in the care of your patient.        Sincerely,        Jim Alexander MD, MD

## 2023-01-26 NOTE — PROGRESS NOTES
AUDIOLOGY REPORT:    Patient was referred to [facility] from ENT by Dr. Alexander for a hearing examination. Patient reports gradual decline in hearing. He notes difficulty when in the presence of background noise or if the person is not speaking directly to him. Parmjit denies pain, pressure, drainage, tinnitus, history of ear surgeries, family history of hearing loss and dizziness. He does note frequent firearm use when he was younger. He was not accompanied to today's appointment.     Testing:    Otoscopy:   Otoscopic exam indicates ears are clear of cerumen bilaterally     Tympanograms:    RIGHT: normal eardrum mobility     LEFT:   normal eardrum mobility    Reflexes (reported by stimulus ear): 1000 Hz  RIGHT: Ipsilateral is present at normal levels  RIGHT: Contralateral is present at normal levels  LEFT:   Ipsilateral is present at normal levels  LEFT:   Contralateral is present at normal levels    Thresholds:   Pure Tone Thresholds assessed using conventional audiometry with good  reliability from 250-8000 Hz bilaterally using insert earphones and circumaural headphones     RIGHT:  normal sloping to severe sensorineural hearing loss    LEFT:    normal sloping to severe sensorineural hearing loss   Note: Asymmetry between ears from 5492-1164 Hz with left being worse than right    Speech Reception Threshold:    RIGHT: 15 dB HL    LEFT:   25 dB HL  Speech Reception Thresholds are in good agreement with pure tone thresholds    Word Recognition Score:     RIGHT: 96% at 60 dB HL using NU-6 recorded word list.    LEFT:   68% at 65 dB HL using NU-6 recorded word list.    LEFT:   72% at 70 dB HL using NU-6 recorded word list.    LEFT:   84% at 75 dB HL using NU-6 recorded word list.    Discussed results with the patient. It is recommended he return for a hearing aid consult, pending medical clearance.     Patient was returned to ENT for follow up.     Skyler Madsen, NIDA-A  Licensed  Audiologist  [place] #[id number]    01/26/23

## 2023-01-26 NOTE — PROGRESS NOTES
ENT Consultation    Alvin Ontiveros is a 67 year old male who is seen in consultation at the request of self.      History of Present Illness - Alvin Ontiveros is a 67 year old male presents for evaluation of hearing loss.  Patient has noticed the significant hearing loss years ago.  Denies any tinnitus vertigo or dizziness.  No significant family history of hearing loss.  Even though he did not work in loud environment he used to hunt and should a lot without protection.  He denies history of ear infections.  Until today he was not aware of any asymmetry in his hearing.  Past Medical History -   Past Medical History:   Diagnosis Date     Chest pain, unspecified     Abstracted 05/17/02     Esophageal reflux     Abstracted 05/17/02     Gastro-oesophageal reflux disease      Hypertension      Other and unspecified alcohol dependence, unspecified drinking behavior     Abstracted 05/17/02     Other and unspecified coagulation defects 9/27/2006    Heterozygous for both Factor V Leiden and Factor II [id number] gene mutations.     Personal history of venous thrombosis and embolism      Pneumonia, organism unspecified(486) 1/2006    Hospitalized 1/2006 with pleuritic chest pain     Pulmonary embolism (H) 2/2006     Thrombosis of leg 2005     TINY PULMONARY NODULES, PRESUMED BENIGN 9/27/2006     Tobacco use disorder     Abstracted 05/17/02       Current Medications -   Current Outpatient Medications:      folic acid (FOLVITE) 1 MG tablet, Take 1 tablet (1,000 mcg) by mouth daily, Disp: 90 tablet, Rfl: 3     hydrochlorothiazide (HYDRODIURIL) 25 MG tablet, Take 1 tablet (25 mg) by mouth daily, Disp: 90 tablet, Rfl: 3     levothyroxine (SYNTHROID/LEVOTHROID) 75 MCG tablet, TAKE 1 TABLET(75 MCG) BY MOUTH DAILY, Disp: 90 tablet, Rfl: 3     losartan (COZAAR) 100 MG tablet, Take 1 tablet (100 mg) by mouth daily, Disp: 90 tablet, Rfl: 3     omeprazole (PRILOSEC) 20 MG DR capsule, Take 1 capsule (20 mg) by mouth daily, Disp: 90  capsule, Rfl: 3     pravastatin (PRAVACHOL) 40 MG tablet, TAKE 1 TABLET BY MOUTH  DAILY AT BEDTIME, Disp: 90 tablet, Rfl: 3     sildenafil (VIAGRA) 100 MG tablet, Take 1 tablet (100 mg) by mouth daily as needed (for erectile dysfunction), Disp: 12 tablet, Rfl: 11     sildenafil (VIAGRA) 100 MG tablet, Take 1 tablet (100 mg) by mouth daily as needed (erectile dysfunction), Disp: 6 tablet, Rfl: 11     warfarin ANTICOAGULANT (JANTOVEN ANTICOAGULANT) 5 MG tablet, TAKE 1 TABLET DAILY EXCEPT TAKE 1/2 TABLET  AND FRIDAY OR AS INSTRUCTED BASED ON INTERNATIONAL NORMALIZED RATIO RESULTS, Disp: 90 tablet, Rfl: 3    Current Facility-Administered Medications:      0.5 mL bupivacaine (MARCAINE) 0.5% injection (50 mL vial), 0.5 mL, , , Du Almaraz MD, 0.5 mL at 22 1107     lidocaine 1 % injection 2 mL, 2 mL, , , Du Almaraz MD, 2 mL at 22 1107     lidocaine 1 % injection 4 mL, 4 mL, , , Francisco Javier Álvarez MD, 4 mL at 10/03/18 1514     triamcinolone (KENALOG-40) injection 40 mg, 40 mg, , , Du Almaraz MD, 40 mg at 22 1107    Allergies -   Allergies   Allergen Reactions     Ace Inhibitors      reaction unknown         Social History -   Social History     Socioeconomic History     Marital status:      Spouse name: Maryam     Number of children: 5     Years of education: None     Highest education level: None   Occupational History     Occupation: Sales      Employer: [employer]   Tobacco Use     Smoking status: Former     Packs/day: 1.00     Years: 30.00     Pack years: 30.00     Types: Cigarettes     Quit date: 2005     Years since quittin.1     Smokeless tobacco: Never   Vaping Use     Vaping Use: Never used   Substance and Sexual Activity     Alcohol use: Yes     Drug use: No     Sexual activity: Yes     Partners: Female   Social History Narrative    , three biologic children, two stepchildren    All five children living in town.     Nine grandchildren.     Works in  sales for [employer], anticipates jail in 2023     Social Determinants of Health     Financial Resource Strain: Low Risk      Difficulty of Paying Living Expenses: Not hard at all   Food Insecurity: No Food Insecurity     Worried About Running Out of Food in the Last Year: Never true     Ran Out of Food in the Last Year: Never true   Transportation Needs: No Transportation Needs     Lack of Transportation (Medical): No     Lack of Transportation (Non-Medical): No   Physical Activity: Sufficiently Active     Days of Exercise per Week: 5 days     Minutes of Exercise per Session: 40 min   Intimate Partner Violence: Not At Risk     Fear of Current or Ex-Partner: No     Emotionally Abused: No     Physically Abused: No     Sexually Abused: No   Housing Stability: Low Risk      Unable to Pay for Housing in the Last Year: No     Number of Places Lived in the Last Year: 1     Unstable Housing in the Last Year: No       Family History -   Family History   Problem Relation Age of Onset     Family History Negative Mother         Born . Lives in assisted living.     Heart Disease Father          age 86. MI at age 67, again at age 80.      Gastrointestinal Disease Father         PUD requiring surgery in the 's.      Family History Negative Sister      Family History Negative Sister      Family History Negative Sister          in MVA at 11 yo     Cerebrovascular Disease Brother         Born . Stroke at age 8 yrs old. Has reduced use of his right arm.      Family History Negative Brother          in infancy       Review of Systems - As per HPI and PMHx, otherwise review of system review of the head and neck negative. Otherwise 10+ review systems negative.    Physical Exam  BP (!) 147/80   Pulse 71   Wt 104.8 kg (231 lb)   BMI 31.33 kg/m    BMI: Body mass index is 31.33 kg/m .    General - The patient is well nourished and well developed, and appears to have good nutritional status.  Alert and oriented  to person and place, answers questions and cooperates with examination appropriately.    SKIN - No suspicious lesions or rashes.  Respiration - No respiratory distress.  Head and Face - Normocephalic and atraumatic, with no gross asymmetry noted of the contour of the facial features.  The facial nerve is intact, with strong symmetric movements.    Voice and Breathing - The patient was breathing comfortably without the use of accessory muscles. The patients voice was clear and strong, and had appropriate pitch and quality.    Ears - Bilateral pinna and EACs with normal appearing overlying skin. Tympanic membrane intact with good mobility on pneumatic otoscopy bilaterally. Bony landmarks of the ossicular chain are normal. The tympanic membranes are normal in appearance. No retraction, perforation, or masses.  No fluid or purulence was seen in the external canal or the middle ear.     Eyes - Extraocular movements intact.  Sclera were not icteric or injected, conjunctiva were pink and moist.        Neuro - Nonfocal neuro exam is normal, CN 2 through 12 intact, normal gait and muscle tone.      Performed in clinic today:  Audiologic Studies - An audiogram and tympanogram were performed today as part of the evaluation and personally reviewed. The tympanogram shows Type A curves on the right and Type A curves on the left, with Normal canal volumes and middle ear pressures.  The audiogram showed Mild to severe sloping mid to high-frequency hearing loss on the right and Mild to severe sloping SNHLon the left.    Word recognition score 96% on the right and 68% on the left.    A/P - Alvin PEDRO Ontiveros is a 67 year old male with asymmetric sensorineural hearing loss bilaterally but in the absence of tinnitus dizziness or vertigo.  We discussed small possibility of retrocochlear pathology such as acoustic neuroma.  He is not interested in imaging at this point understanding slightly increased chance in the setting of asymmetry.   Therefore hearing protection and amplification is strongly encouraged.  Patient will recheck his hearing in 1 year.      Jim Alexander MD

## 2023-02-06 ENCOUNTER — ANTICOAGULATION THERAPY VISIT (OUTPATIENT)
Dept: ANTICOAGULATION | Facility: CLINIC | Age: 68
End: 2023-02-06

## 2023-02-06 ENCOUNTER — LAB (OUTPATIENT)
Dept: LAB | Facility: CLINIC | Age: 68
End: 2023-02-06
Payer: COMMERCIAL

## 2023-02-06 DIAGNOSIS — Z79.01 LONG TERM CURRENT USE OF ANTICOAGULANT THERAPY: ICD-10-CM

## 2023-02-06 DIAGNOSIS — Z79.01 LONG TERM CURRENT USE OF ANTICOAGULANT THERAPY: Primary | ICD-10-CM

## 2023-02-06 DIAGNOSIS — D68.51 HETEROZYGOUS FACTOR V LEIDEN MUTATION (H): ICD-10-CM

## 2023-02-06 DIAGNOSIS — D68.52 PROTHROMBIN GENE MUTATION (H): ICD-10-CM

## 2023-02-06 DIAGNOSIS — Z86.718 PERSONAL HISTORY OF VENOUS THROMBOSIS AND EMBOLISM: ICD-10-CM

## 2023-02-06 LAB — INR BLD: 2.1 (ref 0.9–1.1)

## 2023-02-06 PROCEDURE — 36416 COLLJ CAPILLARY BLOOD SPEC: CPT

## 2023-02-06 PROCEDURE — 85610 PROTHROMBIN TIME: CPT

## 2023-02-06 NOTE — PROGRESS NOTES
ANTICOAGULATION MANAGEMENT     Alvin Ontiveros 67 year old male is on warfarin with therapeutic INR result. (Goal INR 2.0-3.0)    Recent labs: (last 7 days)     02/06/23  1500   INR 2.1*       ASSESSMENT       Source(s): Chart Review and Patient/Caregiver Call       Warfarin doses taken: Warfarin taken as instructed    Diet: No new diet changes identified, but possible had a little more green veggies    New illness, injury, or hospitalization: No    Medication/supplement changes: None noted    Signs or symptoms of bleeding or clotting: No    Previous INR: Therapeutic last 2(+) visits    Additional findings: Plans to go to [place] in March       PLAN     Recommended plan for no diet, medication or health factor changes affecting INR     Dosing Instructions: Continue your current warfarin dose with next INR in 4 weeks since patient plans to be in [place] the last part of March       Summary  As of 2/6/2023    Full warfarin instructions:  2.5 mg every Sun, Fri; 5 mg all other days   Next INR check:  3/6/2023             Telephone call with Parmjit who verbalizes understanding and agrees to plan    Lab visit scheduled    Education provided:     Please call back if any changes to your diet, medications or how you've been taking warfarin    Contact [phone]  with any changes, questions or concerns.     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

## 2023-03-06 ENCOUNTER — LAB (OUTPATIENT)
Dept: LAB | Facility: CLINIC | Age: 68
End: 2023-03-06
Payer: COMMERCIAL

## 2023-03-06 ENCOUNTER — ANTICOAGULATION THERAPY VISIT (OUTPATIENT)
Dept: ANTICOAGULATION | Facility: CLINIC | Age: 68
End: 2023-03-06

## 2023-03-06 DIAGNOSIS — Z79.01 LONG TERM CURRENT USE OF ANTICOAGULANT THERAPY: Primary | ICD-10-CM

## 2023-03-06 DIAGNOSIS — D68.52 PROTHROMBIN GENE MUTATION (H): ICD-10-CM

## 2023-03-06 DIAGNOSIS — D68.51 HETEROZYGOUS FACTOR V LEIDEN MUTATION (H): ICD-10-CM

## 2023-03-06 DIAGNOSIS — Z86.718 PERSONAL HISTORY OF VENOUS THROMBOSIS AND EMBOLISM: ICD-10-CM

## 2023-03-06 DIAGNOSIS — Z79.01 LONG TERM CURRENT USE OF ANTICOAGULANT THERAPY: ICD-10-CM

## 2023-03-06 LAB — INR BLD: 2.2 (ref 0.9–1.1)

## 2023-03-06 PROCEDURE — 36416 COLLJ CAPILLARY BLOOD SPEC: CPT

## 2023-03-06 PROCEDURE — 85610 PROTHROMBIN TIME: CPT

## 2023-03-06 NOTE — PROGRESS NOTES
ANTICOAGULATION MANAGEMENT     Alvin Ontiveros 67 year old male is on warfarin with therapeutic INR result. (Goal INR 2.0-3.0)    Recent labs: (last 7 days)     03/06/23  1455   INR 2.2*       ASSESSMENT     Warfarin Lab Questionnaire    Dose in Tablet or Mg 3/6/2023   TAB or MG? milligram (mg)     Are you listing as Tablet (tab) or Milligram (mg) milligram (mg)   List your dosage for the last 7 days     Myc Anticoag Sunday 2.5   Myc Anticoag Monday 5   Myc Anticoag Tuesday 5   Myc Anticoag Wednesday 5   Myc Anticoag Thursday 5   Myc Anticoag Friday 2.5   Myc Anticoag Saturday 5       Warfarin Lab Questionnaire 3/6/2023   Missed doses? No   Medication changes? No   Abnormal bleeding? No   Shortness of breath? No   Injuries or illness since last INR? No   Changes in diet or alcohol? No   Upcoming surgery, procedure? No   Best number to call with results? [phone]       Additional findings: No longer traveling to [place] this year.       PLAN     Recommended plan for no diet, medication or health factor changes affecting INR     Dosing Instructions: Continue your current warfarin dose with next INR in 5 weeks       Summary  As of 3/6/2023    Full warfarin instructions:  2.5 mg every Sun, Fri; 5 mg all other days   Next INR check:  4/10/2023             Telephone call with Parmjit who verbalizes understanding and agrees to plan    Lab visit scheduled    Education provided:     Please call back if any changes to your diet, medications or how you've been taking warfarin    Plan made per [facility] anticoagulation protocol    Elizabeth Oakley RN  Anticoagulation Clinic  3/6/2023    _______________________________________________________________________     Anticoagulation Episode Summary     Current INR goal:  2.0-3.0   TTR:  87.5 % (1 y)   Target end date:  Indefinite   Send INR reminders to:  [facility]    Indications    Long-term (current) use of anticoagulants [Z79.01] [Z79.01]  Heterozygous factor V  Leiden mutation (H) [D68.51]  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

## 2023-03-20 DIAGNOSIS — E78.5 HYPERLIPIDEMIA LDL GOAL <130: ICD-10-CM

## 2023-03-20 DIAGNOSIS — K21.9 GASTROESOPHAGEAL REFLUX DISEASE WITHOUT ESOPHAGITIS: ICD-10-CM

## 2023-03-20 DIAGNOSIS — E03.4 HYPOTHYROIDISM DUE TO ACQUIRED ATROPHY OF THYROID: ICD-10-CM

## 2023-03-20 DIAGNOSIS — D68.51 HETEROZYGOUS FACTOR V LEIDEN MUTATION (H): ICD-10-CM

## 2023-03-20 DIAGNOSIS — I10 ESSENTIAL HYPERTENSION: ICD-10-CM

## 2023-03-20 NOTE — TELEPHONE ENCOUNTER
Patient asking for 90 supply with 3 refills      Romi L with [facility] calls saying the patient is asking for home deliver of these medications.

## 2023-03-22 RX ORDER — HYDROCHLOROTHIAZIDE 25 MG/1
25 TABLET ORAL DAILY
Qty: 90 TABLET | Refills: 2 | Status: SHIPPED | OUTPATIENT
Start: 2023-03-22 | End: 2023-12-01

## 2023-03-22 RX ORDER — LEVOTHYROXINE SODIUM 75 UG/1
TABLET ORAL
Qty: 90 TABLET | Refills: 2 | Status: SHIPPED | OUTPATIENT
Start: 2023-03-22 | End: 2023-12-01

## 2023-03-22 RX ORDER — WARFARIN SODIUM 5 MG/1
TABLET ORAL
Qty: 90 TABLET | Refills: 1 | Status: SHIPPED | OUTPATIENT
Start: 2023-03-22 | End: 2023-12-01

## 2023-03-22 RX ORDER — LOSARTAN POTASSIUM 100 MG/1
100 TABLET ORAL DAILY
Qty: 90 TABLET | Refills: 2 | Status: SHIPPED | OUTPATIENT
Start: 2023-03-22 | End: 2023-12-01

## 2023-03-22 RX ORDER — PRAVASTATIN SODIUM 40 MG
TABLET ORAL
Qty: 90 TABLET | Refills: 2 | Status: SHIPPED | OUTPATIENT
Start: 2023-03-22 | End: 2023-12-01

## 2023-03-22 NOTE — TELEPHONE ENCOUNTER
Prescription approved per [facility] Refill Protocol.    Due for next appointment around 11/22/23    Routed Coumadin refill request to anti coag pool

## 2023-03-22 NOTE — TELEPHONE ENCOUNTER
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 Rx instructions and quantity match patient's current dosing plan. Warfarin 90 day supply with 1 refill granted per [facility] protocol     Luna Oakley RN  Anticoagulation Clinic

## 2023-04-10 ENCOUNTER — ANTICOAGULATION THERAPY VISIT (OUTPATIENT)
Dept: ANTICOAGULATION | Facility: CLINIC | Age: 68
End: 2023-04-10

## 2023-04-10 ENCOUNTER — LAB (OUTPATIENT)
Dept: LAB | Facility: CLINIC | Age: 68
End: 2023-04-10
Payer: COMMERCIAL

## 2023-04-10 DIAGNOSIS — D68.52 PROTHROMBIN GENE MUTATION (H): ICD-10-CM

## 2023-04-10 DIAGNOSIS — Z79.01 LONG TERM CURRENT USE OF ANTICOAGULANT THERAPY: Primary | ICD-10-CM

## 2023-04-10 DIAGNOSIS — Z86.718 PERSONAL HISTORY OF VENOUS THROMBOSIS AND EMBOLISM: ICD-10-CM

## 2023-04-10 DIAGNOSIS — D68.51 HETEROZYGOUS FACTOR V LEIDEN MUTATION (H): ICD-10-CM

## 2023-04-10 DIAGNOSIS — Z79.01 LONG TERM CURRENT USE OF ANTICOAGULANT THERAPY: ICD-10-CM

## 2023-04-10 LAB — INR BLD: 2.1 (ref 0.9–1.1)

## 2023-04-10 PROCEDURE — 36415 COLL VENOUS BLD VENIPUNCTURE: CPT

## 2023-04-10 PROCEDURE — 85610 PROTHROMBIN TIME: CPT

## 2023-04-10 NOTE — PROGRESS NOTES
ANTICOAGULATION MANAGEMENT     Alvin Ontiveros 67 year old male is on warfarin with therapeutic INR result. (Goal INR 2.0-3.0)    Recent labs: (last 7 days)     04/10/23  1445   INR 2.1*       ASSESSMENT     Warfarin Lab Questionnaire        4/10/2023     2:36 PM   Dose in Tablet or Mg   TAB or MG? milligram (mg)     Pt Rptd Dose BETO MONDAY TUESDAY WED THURS FRIDAY SATURDAY   4/10/2023   2:36 PM 2.5 5 5 5 5 2.5 5         4/10/2023     2:36 PM   Warfarin Lab Questionnaire   Missed doses? No   Medication changes? No   Abnormal bleeding? No   Shortness of breath? No   Injuries or illness since last INR? No   Changes in diet or alcohol? No   Upcoming surgery, procedure? No   Best number to call with results? [phone]       Additional findings: None       PLAN     Recommended plan for no diet, medication or health factor changes affecting INR     Dosing Instructions: Continue your current warfarin dose with next INR in 6 weeks       Summary  As of 4/10/2023    Full warfarin instructions:  2.5 mg every Sun, Fri; 5 mg all other days   Next INR check:  5/22/2023             Telephone call with Parmjit who verbalizes understanding and agrees to plan    Lab visit scheduled    Education provided:     Please call back if any changes to your diet, medications or how you've been taking warfarin    Contact [phone]  with any changes, questions or concerns.     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

## 2023-04-17 ENCOUNTER — TRANSFERRED RECORDS (OUTPATIENT)
Dept: HEALTH INFORMATION MANAGEMENT | Facility: CLINIC | Age: 68
End: 2023-04-17
Payer: COMMERCIAL

## 2023-05-11 ENCOUNTER — OFFICE VISIT (OUTPATIENT)
Dept: FAMILY MEDICINE | Facility: CLINIC | Age: 68
End: 2023-05-11
Payer: COMMERCIAL

## 2023-05-11 VITALS
WEIGHT: 232.5 LBS | HEIGHT: 72 IN | OXYGEN SATURATION: 99 % | HEART RATE: 58 BPM | SYSTOLIC BLOOD PRESSURE: 162 MMHG | DIASTOLIC BLOOD PRESSURE: 82 MMHG | BODY MASS INDEX: 31.49 KG/M2 | RESPIRATION RATE: 16 BRPM

## 2023-05-11 DIAGNOSIS — I10 ESSENTIAL HYPERTENSION: ICD-10-CM

## 2023-05-11 DIAGNOSIS — B02.9 HERPES ZOSTER WITHOUT COMPLICATION: Primary | ICD-10-CM

## 2023-05-11 PROCEDURE — 99213 OFFICE O/P EST LOW 20 MIN: CPT | Performed by: FAMILY MEDICINE

## 2023-05-11 RX ORDER — VALACYCLOVIR HYDROCHLORIDE 1 G/1
1000 TABLET, FILM COATED ORAL 3 TIMES DAILY
Qty: 21 TABLET | Refills: 0 | Status: SHIPPED | OUTPATIENT
Start: 2023-05-11 | End: 2023-12-01

## 2023-05-11 NOTE — ASSESSMENT & PLAN NOTE
Start on antivirals.   Discussed keeping area covered - infectious until vesicles crust over.  Call if any persistent pain.

## 2023-05-11 NOTE — PROGRESS NOTES
Assessment & Plan   Problem List Items Addressed This Visit     Essential hypertension     Recommend he recheck BP at home or in clinic.         Herpes zoster without complication - Primary     Start on antivirals.   Discussed keeping area covered - infectious until vesicles crust over.  Call if any persistent pain.         Relevant Medications    valACYclovir (VALTREX) 1000 mg tablet        Nina Lambert MD  [facility] SOL Parnell is a 67 year old, presenting for the following health issues:  Derm Problem (Rash on side notice since this morning)        5/11/2023     4:39 PM   Additional Questions   Roomed by xl     He developed a rash on his left flank today.  He was doing a lot of yard work yesterday and felt a soreness like a bruise in the area.  Noticed a rash today.  Only on left side of body.  Not itchy. He did not have his Shingrix vaccine.    He otherwise feels well.  Just retired. No unusual stressors.  Sleeping well.    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     Heterozygous factor V Leiden mutation (H)     Heterozygous Prothrombin Gene Mutation     HYPERLIPIDEMIA LDL GOAL <130     Prediabetes     Advanced directives, counseling/discussion     Long-term (current) use of anticoagulants [Z79.01]     Benign neoplasm of colon, unspecified part of colon     Primary osteoarthritis of left knee     Alcohol dependence in remission (H)     Pulmonary embolism (H)     Primary hypercoagulable state (H)     Hiatal hernia     Herpes zoster without complication      History of Present Illness       Reason for visit:  Rash  Symptom onset:  Today    He eats 2-3 servings of fruits and vegetables daily.He consumes 1 sweetened beverage(s) daily.He exercises with enough effort to increase  his heart rate 30 to 60 minutes per day.  He exercises with enough effort to increase his heart rate 5 days per week.   He is taking medications regularly.               Review of Systems         Objective    BP (!) 162/82 (BP Location: Left arm, Patient Position: Sitting, Cuff Size: Adult Regular)   Pulse 58   Resp 16   Ht 1.829 m (6')   Wt 105.5 kg (232 lb 8 oz)   SpO2 99%   BMI 31.53 kg/m    Body mass index is 31.53 kg/m .  Physical Exam   GENERAL: Healthy, alert and no distress  EYES: Eyes grossly normal to inspection.  No discharge or erythema, or obvious scleral/conjunctival abnormalities.  RESP: No audible wheeze, cough, or visible cyanosis.  No visible retractions or increased work of breathing.    NEURO: Cranial nerves grossly intact.  Mentation and speech appropriate for age.  PSYCH: Mentation appears normal, affect normal/bright, judgement and insight intact, normal speech and appearance well-groomed.    Skin - vesicular rash over left flank    BP Readings from Last 6 Encounters:   05/11/23 (!) 162/82   01/26/23 (!) 147/80   12/13/22 138/82   11/22/22 138/72   04/28/22 (!) 144/74   01/04/22 (!) 144/84     Nina Lambert MD

## 2023-05-12 ENCOUNTER — TELEPHONE (OUTPATIENT)
Dept: FAMILY MEDICINE | Facility: CLINIC | Age: 68
End: 2023-05-12
Payer: COMMERCIAL

## 2023-05-12 NOTE — TELEPHONE ENCOUNTER
Left message to call back for: Patient  Information to relay to patient: See provider message and relay.

## 2023-05-12 NOTE — TELEPHONE ENCOUNTER
----- Message from Nina Lambert MD sent at 5/11/2023  6:13 PM CDT -----  Regarding: High blood pressure  Advise pt that after he left, I noticed BP was high.  I recommend he get that rechecked at home or his clinic.  Goal is less than 140 / 90.    Nina Lambert MD

## 2023-05-15 DIAGNOSIS — R73.03 PREDIABETES: ICD-10-CM

## 2023-05-15 NOTE — TELEPHONE ENCOUNTER
Patients insurance will no longer pay for the folic Acid.  Patient spoke to Pharmacist and a 90 day refill is only $10.00 out of pocket.  Please send 90 day RX to Pharmacy.    folic acid (FOLVITE) 1 MG tablet      Last Written Prescription Date:  11/22/22  Last Fill Quantity: 90,   # refills: 3  Last Office Visit: 11/22/22  Future Office visit:

## 2023-05-16 RX ORDER — FOLIC ACID 1 MG/1
1000 TABLET ORAL DAILY
Qty: 90 TABLET | Refills: 1 | Status: SHIPPED | OUTPATIENT
Start: 2023-05-16 | End: 2023-11-06

## 2023-05-19 ENCOUNTER — APPOINTMENT (OUTPATIENT)
Dept: MRI IMAGING | Facility: CLINIC | Age: 68
End: 2023-05-19
Attending: EMERGENCY MEDICINE
Payer: COMMERCIAL

## 2023-05-19 ENCOUNTER — NURSE TRIAGE (OUTPATIENT)
Dept: INTERNAL MEDICINE | Facility: CLINIC | Age: 68
End: 2023-05-19
Payer: COMMERCIAL

## 2023-05-19 ENCOUNTER — HOSPITAL ENCOUNTER (EMERGENCY)
Facility: CLINIC | Age: 68
Discharge: HOME | End: 2023-05-19
Attending: EMERGENCY MEDICINE | Admitting: EMERGENCY MEDICINE
Payer: COMMERCIAL

## 2023-05-19 VITALS
WEIGHT: 225 LBS | HEIGHT: 73 IN | HEART RATE: 64 BPM | BODY MASS INDEX: 29.82 KG/M2 | TEMPERATURE: 97.5 F | SYSTOLIC BLOOD PRESSURE: 155 MMHG | RESPIRATION RATE: 18 BRPM | DIASTOLIC BLOOD PRESSURE: 80 MMHG | OXYGEN SATURATION: 98 %

## 2023-05-19 DIAGNOSIS — M48.061 SPINAL STENOSIS OF LUMBAR REGION WITHOUT NEUROGENIC CLAUDICATION: ICD-10-CM

## 2023-05-19 DIAGNOSIS — R20.0 NUMBNESS OF LEFT FOOT: ICD-10-CM

## 2023-05-19 LAB
ANION GAP SERPL CALCULATED.3IONS-SCNC: 6 MMOL/L (ref 7–15)
BASOPHILS # BLD AUTO: 0 10E3/UL (ref 0–0.2)
BASOPHILS NFR BLD AUTO: 1 %
BUN SERPL-MCNC: 13.4 MG/DL (ref 8–23)
CALCIUM SERPL-MCNC: 10.7 MG/DL (ref 8.8–10.2)
CHLORIDE SERPL-SCNC: 102 MMOL/L (ref 98–107)
CREAT SERPL-MCNC: 0.8 MG/DL (ref 0.67–1.17)
DEPRECATED HCO3 PLAS-SCNC: 30 MMOL/L (ref 22–29)
EOSINOPHIL # BLD AUTO: 0.1 10E3/UL (ref 0–0.7)
EOSINOPHIL NFR BLD AUTO: 2 %
ERYTHROCYTE [DISTWIDTH] IN BLOOD BY AUTOMATED COUNT: 13.9 % (ref 10–15)
GFR SERPL CREATININE-BSD FRML MDRD: >90 ML/MIN/1.73M2
GLUCOSE SERPL-MCNC: 98 MG/DL (ref 70–99)
HCT VFR BLD AUTO: 44.8 % (ref 40–53)
HGB BLD-MCNC: 15.5 G/DL (ref 13.3–17.7)
HOLD SPECIMEN: NORMAL
IMM GRANULOCYTES # BLD: 0 10E3/UL
IMM GRANULOCYTES NFR BLD: 0 %
INR PPP: 1.82 (ref 0.85–1.15)
LYMPHOCYTES # BLD AUTO: 1.8 10E3/UL (ref 0.8–5.3)
LYMPHOCYTES NFR BLD AUTO: 31 %
MCH RBC QN AUTO: 30.9 PG (ref 26.5–33)
MCHC RBC AUTO-ENTMCNC: 34.6 G/DL (ref 31.5–36.5)
MCV RBC AUTO: 89 FL (ref 78–100)
MONOCYTES # BLD AUTO: 0.5 10E3/UL (ref 0–1.3)
MONOCYTES NFR BLD AUTO: 9 %
NEUTROPHILS # BLD AUTO: 3.3 10E3/UL (ref 1.6–8.3)
NEUTROPHILS NFR BLD AUTO: 57 %
NRBC # BLD AUTO: 0 10E3/UL
NRBC BLD AUTO-RTO: 0 /100
PLATELET # BLD AUTO: 159 10E3/UL (ref 150–450)
POTASSIUM SERPL-SCNC: 4.1 MMOL/L (ref 3.4–5.3)
RBC # BLD AUTO: 5.01 10E6/UL (ref 4.4–5.9)
SODIUM SERPL-SCNC: 138 MMOL/L (ref 136–145)
WBC # BLD AUTO: 5.7 10E3/UL (ref 4–11)

## 2023-05-19 PROCEDURE — 80048 BASIC METABOLIC PNL TOTAL CA: CPT | Performed by: EMERGENCY MEDICINE

## 2023-05-19 PROCEDURE — 99285 EMERGENCY DEPT VISIT HI MDM: CPT | Mod: 25

## 2023-05-19 PROCEDURE — 72158 MRI LUMBAR SPINE W/O & W/DYE: CPT

## 2023-05-19 PROCEDURE — 255N000002 HC RX 255 OP 636: Performed by: EMERGENCY MEDICINE

## 2023-05-19 PROCEDURE — 70553 MRI BRAIN STEM W/O & W/DYE: CPT

## 2023-05-19 PROCEDURE — 72157 MRI CHEST SPINE W/O & W/DYE: CPT

## 2023-05-19 PROCEDURE — 85610 PROTHROMBIN TIME: CPT | Performed by: EMERGENCY MEDICINE

## 2023-05-19 PROCEDURE — A9585 GADOBUTROL INJECTION: HCPCS | Performed by: EMERGENCY MEDICINE

## 2023-05-19 PROCEDURE — 36415 COLL VENOUS BLD VENIPUNCTURE: CPT | Performed by: EMERGENCY MEDICINE

## 2023-05-19 PROCEDURE — 85025 COMPLETE CBC W/AUTO DIFF WBC: CPT | Performed by: EMERGENCY MEDICINE

## 2023-05-19 RX ORDER — METHYLPREDNISOLONE 4 MG
TABLET, DOSE PACK ORAL
Qty: 21 TABLET | Refills: 0 | Status: SHIPPED | OUTPATIENT
Start: 2023-05-19 | End: 2023-06-05

## 2023-05-19 RX ORDER — GADOBUTROL 604.72 MG/ML
10 INJECTION INTRAVENOUS ONCE
Status: COMPLETED | OUTPATIENT
Start: 2023-05-19 | End: 2023-05-19

## 2023-05-19 RX ADMIN — GADOBUTROL 10 ML: 604.72 INJECTION INTRAVENOUS at 17:48

## 2023-05-19 ASSESSMENT — ACTIVITIES OF DAILY LIVING (ADL)
ADLS_ACUITY_SCORE: 35

## 2023-05-19 NOTE — TELEPHONE ENCOUNTER
"S-(situation): left leg numbness    B-(background): has been on medication for shingles and just finished the medication.  Started to notice yesterday that the outer aspect of his leg is numb from hip to foot.  Always has some numbness in left foot, but stated now is worse and also going up leg to hip.     A-(assessment): denied any swelling, redness, back pain or issues, recent injury, pain, chest pain, weakness, facial droop, left sided weakness, headache, dizziness, difficulty breathing, double vision, changes in speech, unsteadiness on feet.  Numbness is constant and present now.      R-(recommendations): Call 911-   Patient stated that his 99 year old mother is needing to go to the hospital and he is on his way to her place to take her to the hospital.  Patient stated he will take care of her first and then himself.  Advised that he needs to take this seriously as time is brain.  Patient verbalized understanding, but needs to take care of his mother first.          Reason for Disposition    New neurologic deficit that is present NOW, sudden onset of ANY of the following: * Weakness of the face, arm, or leg on one side of the body* Numbness of the face, arm, or leg on one side of the body* Loss of speech or garbled speech    Answer Assessment - Initial Assessment Questions  1. SYMPTOM: \"What is the main symptom you are concerned about?\" (e.g., weakness, numbness)      numbness  2. ONSET: \"When did this start?\" (minutes, hours, days; while sleeping)      Yesterday morning  3. LAST NORMAL: \"When was the last time you (the patient) were normal (no symptoms)?\"      Has some numbness in left foot, but noticed numbness is worse and is going up the outer aspect of the left leg from hip to foot  4. PATTERN \"Does this come and go, or has it been constant since it started?\"  \"Is it present now?\"      Constant and present now  5. CARDIAC SYMPTOMS: \"Have you had any of the following symptoms: chest pain, difficulty " "breathing, palpitations?\"      no  6. NEUROLOGIC SYMPTOMS: \"Have you had any of the following symptoms: headache, dizziness, vision loss, double vision, changes in speech, unsteady on your feet?\"      no  7. OTHER SYMPTOMS: \"Do you have any other symptoms?\"      Was feeling weak on Wednesday 5/17/23- not feeling this anymore since Wednesday  8. PREGNANCY: \"Is there any chance you are pregnant?\" \"When was your last menstrual period?\"      n/a    Protocols used: NEUROLOGIC DEFICIT-A-[place]      "

## 2023-05-19 NOTE — ED PROVIDER NOTES
"  History     Chief Complaint:  Shingles and Numbness     HPI   Alvin Ontiveros is a 67 year old male with history of hypertension, pulmonary embolism, and Leiden factor 5 on Coumadin who presents to the ED with his wife for left leg numbness. On 5/11/23 the patient was diagnosed with shingles to his left lower flank. He finished prescription yesterday with improvement. Today he got out of the shower and noticed he had tingling numbness through his left leg into his left hip. The patient endorses history of neuropathy to his left foot but not as severe. He denies weakness to the leg, changes in vision, changes in speech, change in gait, saddle anesthesia, incontinence, or rash to the leg.     Independent Historian:   None - Patient Only    Review of External Notes:   None     Medications:    Folvite  Hydrodiuril  Synthroid  Cozaar  Prilosec  Pravachol  Valtrex  Coumadin    Past Medical History:    Esophageal Reflux  GERD  Hypertension  Alcohol dependence  Venous thrombosis and embolism  Pneumonia  Pulmonary embolism  DVT  Tobacco use  Hiatal Hernia  Factor V Leiden Mutation    Past Surgical History:    Tonsillectomy  Hernia Repair    Physical Exam     Patient Vitals for the past 24 hrs:   BP Temp Temp src Pulse Resp SpO2 Height Weight   05/19/23 1915 (!) 155/80 -- -- 64 -- 98 % -- --   05/19/23 1900 (!) 143/83 -- -- 67 -- 97 % -- --   05/19/23 1130 (!) 154/82 97.5  F (36.4  C) Temporal 68 18 100 % 1.854 m (6' 1\") 102.1 kg (225 lb)        Physical Exam   Constitutional: Alert, attentive, GCS 15   Eyes: EOM are normal, anicteric, conjugate gaze  CV: distal extremities warm, well perfused  Chest: Non-labored breathing on RA  GI:  non tender. No distension. No guarding or rebound.    Skin: Healing vesicles over left flank, likely T11/T12 distribution.  Neurological:   GCS 15; A/Ox3; Cranial nerves 2-12 intact;   5/5 strength throughout the upper and lower extremities;   sensation intact to light touch throughout the " upper and lower extremities; diminished L leg wholly.   normal fine motor coordination intact bilaterally;   normal gait   Skin: Skin is warm and dry    Emergency Department Course   Imaging:  Lumbar spine MRI w & w/o contrast - surgery <10yrs   Final Result   IMPRESSION:   1.  Severe acquired and congenital spinal canal stenosis at L3-L4.   2.  Moderate acquired and congenital spinal canal stenosis at L2-L3.   3.  Severe lateral recess stenosis with nerve impingement on the left at L4-L5 and L5-S1.      MR Thoracic Spine w/o & w Contrast   Final Result   IMPRESSION:   1.  No definitive T2 cord signal abnormality or abnormal cord enhancement.   2.  No spinal canal or foraminal compromise.      MR Brain w/o & w Contrast   Final Result   IMPRESSION:   1.  No acute or subacute ischemic change.   2.  No acute intracranial pathology or abnormal enhancement.   3.  Mild age-related changes as above.         Report per radiology    Laboratory:  Labs Ordered and Resulted from Time of ED Arrival to Time of ED Departure   INR - Abnormal       Result Value    INR 1.82 (*)    BASIC METABOLIC PANEL - Abnormal    Sodium 138      Potassium 4.1      Chloride 102      Carbon Dioxide (CO2) 30 (*)     Anion Gap 6 (*)     Urea Nitrogen 13.4      Creatinine 0.80      Calcium 10.7 (*)     Glucose 98      GFR Estimate >90     CBC WITH PLATELETS AND DIFFERENTIAL    WBC Count 5.7      RBC Count 5.01      Hemoglobin 15.5      Hematocrit 44.8      MCV 89      MCH 30.9      MCHC 34.6      RDW 13.9      Platelet Count 159      % Neutrophils 57      % Lymphocytes 31      % Monocytes 9      % Eosinophils 2      % Basophils 1      % Immature Granulocytes 0      NRBCs per 100 WBC 0      Absolute Neutrophils 3.3      Absolute Lymphocytes 1.8      Absolute Monocytes 0.5      Absolute Eosinophils 0.1      Absolute Basophils 0.0      Absolute Immature Granulocytes 0.0      Absolute NRBCs 0.0        Emergency Department Course & Assessments:      Interventions:  Medications   gadobutrol (GADAVIST) injection 10 mL (10 mLs Intravenous $Given 5/19/23 6953)      Assessments:  1330 Initial Intervention  1605 Recheck and discussed results  1945 Recheck    Independent Interpretation (X-rays, CTs, rhythm strip):  None    Consultations/Discussion of Management or Tests:  None      Social Determinants of Health affecting care:   None    Disposition:  The patient was discharged to home.     Impression & Plan    CMS Diagnoses: None    Medical Decision Making:  Alvin Ontiveros is a 67 year old male recent past medical history significant for shingles just completed his antivirals yesterday, prior DVT with factor V Leiden on Coumadin presenting for left-sided leg tingling/numbness.  He has chronic left lateral foot pain without associated back pain and today had increasing numbness diffusely and circumferentially to his left foot.  Given his symptoms, lower suspicion for stroke however due to his age, MRI was obtained and this is unremarkable.  Given his recent shingles, MRI of his T and L-spine's was obtained which shows no evidence of T2 signal within the spinal cord however does show degenerative changes with both acquired and congenital severe central stenosis of L4-L5 and L5-S1 as well as some lateral recess stenosis of L4-L5 and L5-S1.  His tingling resolved while here in the emergency department however due to his chronic left foot tingling, I do think is reasonable for him to follow-up in spinal surgery however I see no indication for emergent decompression as he has no red flag symptoms.  I will initiate a Medrol Dosepak, patient expressed understand this plan and he was discharged, able to ambulate steady    Diagnosis:    ICD-10-CM    1. Numbness of left foot  R20.0       2. Spinal stenosis of lumbar region without neurogenic claudication  M48.061            Discharge Medications:  Discharge Medication List as of 5/19/2023  7:56 PM      START taking these  medications    Details   methylPREDNISolone (MEDROL DOSEPAK) 4 MG tablet therapy pack Follow Package Directions, Disp-21 tablet, R-0, Local Print           Maicol Irene MD  [employer]  9:01 PM 05/19/23      Scribe Disclosure:  I, Patrice Fontana, am serving as a scribe at 3:50 PM on 5/19/2023 to document services personally performed by Maicol Irene MD based on my observations and the provider's statements to me.     5/19/2023   Maicol Irene MD Dunbar, John Forrest, MD  05/19/23 0590

## 2023-05-19 NOTE — ED TRIAGE NOTES
ABCs intact. Pt was dx shingles on 5/11. Pt finished medications yesterday. Pt c/o increased numbness in L leg around 1000 yesterday. Pt states he always has some numbness in L foot but is not more pronounced.

## 2023-05-19 NOTE — ED NOTES
PIT/Triage Evaluation    Alvin Ontiveros is a 67 year old male with history of hypertension, pulmonary embolism, and Leiden factor 5 on Coumadin who presents to the ED with his wife for left leg numbness. On 5/11/23 the patient was diagnosed with shingles to his left lower flank. He finished prescription yesterday with improvement. Today he got out of the shower and noticed he had tingling numbness through his left leg into his left hip. The patient endorses history of neuropathy to his left foot but not as severe. He denies weakness to the leg, changes in vision, changes in speech, change in gait, saddle anesthesia, incontinence, or rash to the leg.    Exam is notable for:  Constitutional: Alert, attentive, GCS 15   Eyes: EOM are normal, anicteric, conjugate gaze  CV: distal extremities warm, well perfused  Chest: Non-labored breathing on RA  GI:  non tender. No distension. No guarding or rebound.    Neurological:   GCS 15; A/Ox3; Cranial nerves 2-12 intact;   5/5 strength throughout the upper and lower extremities;   sensation intact to light touch throughout the upper and lower extremities; diminished L leg wholly.   normal fine motor coordination intact bilaterally;   normal gait   Skin: Skin is warm and dry.    Appropriate interventions for symptom management were initiated if applicable.  Appropriate diagnostic tests were initiated if indicated.    Important information for subsequent clinician:  Alvin Ontiveros is a 67 year old male history of factor V Leiden, on Coumadin, which was completed antivirals for left flank shingles presenting for increasing left leg numbness.  He has longstanding history of left foot numbness however now feels like his whole left leg is numb without weakness.  Denies any other lateralizing deficits, no vision changes, trouble speaking, or walking.  We will plan for MRI primarily to rule out potential transverse mellitus with lower suspicion for stroke however given his age will  plan for MRI brain.    I briefly evaluated the patient and developed an initial plan of care. I discussed this plan and explained that this brief interaction does not constitute a full evaluation. Patient/family understands that they should wait to be fully evaluated and discuss any test results with another clinician prior to leaving the hospital.    Maicol Irene MD  [employer]  1:34 PM 05/19/23     Scribe Disclosure:  I, Patrice Fontana, am serving as a scribe at 1:32 PM on 5/19/2023 to document services personally performed by Maicol Irene MD based on my observations and the provider's statements to me.       Maicol Irene MD  05/19/23 3800

## 2023-05-20 NOTE — DISCHARGE INSTRUCTIONS
You should start taking the Medrol Dosepak tomorrow, you should return to the emergency department should you develop increasing weakness or numbness of your leg, especially if you numbness when you wipe or go to the bathroom or you, incontinence clearly becomes weak.  Otherwise, you should follow-up in spine clinic.

## 2023-05-22 ENCOUNTER — ANTICOAGULATION THERAPY VISIT (OUTPATIENT)
Dept: ANTICOAGULATION | Facility: CLINIC | Age: 68
End: 2023-05-22

## 2023-05-22 ENCOUNTER — DOCUMENTATION ONLY (OUTPATIENT)
Dept: INTERNAL MEDICINE | Facility: CLINIC | Age: 68
End: 2023-05-22

## 2023-05-22 ENCOUNTER — LAB (OUTPATIENT)
Dept: LAB | Facility: CLINIC | Age: 68
End: 2023-05-22
Payer: COMMERCIAL

## 2023-05-22 ENCOUNTER — TELEPHONE (OUTPATIENT)
Dept: INTERNAL MEDICINE | Facility: CLINIC | Age: 68
End: 2023-05-22

## 2023-05-22 DIAGNOSIS — Z86.718 PERSONAL HISTORY OF VENOUS THROMBOSIS AND EMBOLISM: ICD-10-CM

## 2023-05-22 DIAGNOSIS — Z79.01 LONG TERM CURRENT USE OF ANTICOAGULANT THERAPY: Primary | ICD-10-CM

## 2023-05-22 DIAGNOSIS — D68.52 PROTHROMBIN GENE MUTATION (H): ICD-10-CM

## 2023-05-22 DIAGNOSIS — D68.51 HETEROZYGOUS FACTOR V LEIDEN MUTATION (H): ICD-10-CM

## 2023-05-22 DIAGNOSIS — Z79.01 LONG TERM CURRENT USE OF ANTICOAGULANT THERAPY: ICD-10-CM

## 2023-05-22 LAB — INR BLD: 1.8 (ref 0.9–1.1)

## 2023-05-22 PROCEDURE — 85610 PROTHROMBIN TIME: CPT

## 2023-05-22 PROCEDURE — 36416 COLLJ CAPILLARY BLOOD SPEC: CPT

## 2023-05-22 NOTE — TELEPHONE ENCOUNTER
General Call    Contacts       Type Contact Phone/Fax    05/22/2023 11:07 AM CDT Phone (Incoming) Parmjit Ontiveros (Self) [phone] (M)        Reason for Call:  Pt would like call back from INR nurse to discuss recent INR result from ER and also to discuss if pt should come in for INR lab draw today. Please call pt back as soon as possible to discuss.    What are your questions or concerns:  na    Date of last appointment with provider: 11/22/22    Okay to leave a detailed message?: Yes at Cell number on file:    Telephone Information:   Mobile [phone]

## 2023-05-22 NOTE — PROGRESS NOTES
ANTICOAGULATION MANAGEMENT     Alvin Ontiveros 67 year old male is on warfarin with subtherapeutic INR result. (Goal INR 2.0-3.0)    Recent labs: (last 7 days)     05/22/23  1450   INR 1.8*       ASSESSMENT     Warfarin Lab Questionnaire    Warfarin Doses Last 7 Days      5/22/2023     2:46 PM   Dose in Tablet or Mg   TAB or MG? tablet (tab)     Pt Rptd Dose SUNDAY MONDAY TUESDAY WED THURS FRIDAY SATURDAY 5/22/2023   2:46 PM 0.5 1 1 1 1 0.5 1         5/22/2023   Warfarin Lab Questionnaire   Missed doses within past 14 days? No - patient denies any missed warfarin doses   Changes in diet or alcohol within past 14 days? No - possible had extra greens   Medication changes since last result? No - 5/19/23 Medrol dosepak Rx given at ED - Per [employer]:  Concurrent use of METHYLPREDNISOLONE and WARFARIN may result in increased risk of bleeding or diminished effects of warfarin. - Patient reports he is not going to start the steroid   Injuries or illness since last result? No - Emergency room visit on 5/19/23 for left leg numbness.   New shortness of breath, severe headaches or sudden changes in vision since last result? No   Abnormal bleeding since last result? No   Upcoming surgery, procedure? No   Best number to call with results? [phone]        Previous result: Therapeutic last 2(+) visits however was subtherapeutic at ED visit 5/19/23  Additional findings: Patient will be leaving for [place] for a fishing trip 5/24/23, will return 5/30/23       PLAN     Recommended plan for no diet, medication or health factor changes affecting INR     Dosing Instructions: booster dose then continue your current warfarin dose with next INR in 1-2 weeks       Summary  As of 5/22/2023    Full warfarin instructions:  5/22: 7.5 mg; Otherwise 2.5 mg every Sun, Fri; 5 mg all other days   Next INR check:  6/5/2023             Telephone call with Parmjit who verbalizes understanding and agrees to plan    Lab visit scheduled    Education  provided:     Please call back if any changes to your diet, medications or how you've been taking warfarin    Contact [phone]  with any changes, questions or concerns.     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

## 2023-05-22 NOTE — TELEPHONE ENCOUNTER
Returned call to patient. Advised he should have his INR checked today since it was 1.82 5/19/23 in the ED. Patient also reports that he has not started the steroid, was going to fill that Rx today.    Patient agrees to plan to have INR checked today.    Luna Oakley RN, BSN  Anticoagulation Clinic

## 2023-05-22 NOTE — PROGRESS NOTES
ANTICOAGULATION  MANAGEMENT: Discharge Review    Alvin Ontiveros chart reviewed for anticoagulation continuity of care    Emergency room visit on 5/19/23 for left leg numbness.    Discharge disposition: Home    Results:    Recent labs: (last 7 days)     05/19/23  1352   INR 1.82*     Anticoagulation inpatient management:     not applicable     Anticoagulation discharge instructions:     Warfarin dosing: home regimen continued   Bridging: No   INR goal change: No      Medication changes affecting anticoagulation: Yes: Methylprednisolone (may enhance anticoagulant effect per Uptodate)    Additional factors affecting anticoagulation: No     PLAN     No adjustment to anticoagulation plan needed    Recommended follow up is scheduled  Patient not contacted - Parmjit is coming in for follow up INR today, 5/22/23    No adjustment to Anticoagulation Calendar was required    Elizabeth Oakley RN

## 2023-05-25 ENCOUNTER — TELEPHONE (OUTPATIENT)
Dept: NEUROSURGERY | Facility: OTHER | Age: 68
End: 2023-05-25
Payer: COMMERCIAL

## 2023-05-25 NOTE — TELEPHONE ENCOUNTER
LMTC- patient needs to be rescheduled with Dr. Hoffmann and not Alvina Trujillo- per staff message

## 2023-05-26 NOTE — TELEPHONE ENCOUNTER
2nd attempt to reach patient- patient needs to be rescheduled from [facility] and directly with Dr. Hoffmann.

## 2023-06-05 ENCOUNTER — LAB (OUTPATIENT)
Dept: LAB | Facility: CLINIC | Age: 68
End: 2023-06-05
Payer: COMMERCIAL

## 2023-06-05 ENCOUNTER — OFFICE VISIT (OUTPATIENT)
Dept: NEUROSURGERY | Facility: CLINIC | Age: 68
End: 2023-06-05
Payer: COMMERCIAL

## 2023-06-05 ENCOUNTER — ANTICOAGULATION THERAPY VISIT (OUTPATIENT)
Dept: ANTICOAGULATION | Facility: CLINIC | Age: 68
End: 2023-06-05

## 2023-06-05 VITALS — DIASTOLIC BLOOD PRESSURE: 80 MMHG | OXYGEN SATURATION: 98 % | HEART RATE: 64 BPM | SYSTOLIC BLOOD PRESSURE: 155 MMHG

## 2023-06-05 DIAGNOSIS — M48.061 SPINAL STENOSIS, LUMBAR REGION, WITHOUT NEUROGENIC CLAUDICATION: Primary | ICD-10-CM

## 2023-06-05 DIAGNOSIS — D68.51 HETEROZYGOUS FACTOR V LEIDEN MUTATION (H): ICD-10-CM

## 2023-06-05 DIAGNOSIS — Z79.01 LONG TERM CURRENT USE OF ANTICOAGULANT THERAPY: Primary | ICD-10-CM

## 2023-06-05 DIAGNOSIS — Z79.01 LONG TERM CURRENT USE OF ANTICOAGULANT THERAPY: ICD-10-CM

## 2023-06-05 DIAGNOSIS — D68.52 PROTHROMBIN GENE MUTATION (H): ICD-10-CM

## 2023-06-05 DIAGNOSIS — Z86.718 PERSONAL HISTORY OF VENOUS THROMBOSIS AND EMBOLISM: ICD-10-CM

## 2023-06-05 LAB — INR BLD: 2.9 (ref 0.9–1.1)

## 2023-06-05 PROCEDURE — 36416 COLLJ CAPILLARY BLOOD SPEC: CPT

## 2023-06-05 PROCEDURE — 85610 PROTHROMBIN TIME: CPT

## 2023-06-05 PROCEDURE — 99203 OFFICE O/P NEW LOW 30 MIN: CPT | Performed by: NEUROLOGICAL SURGERY

## 2023-06-05 NOTE — PROGRESS NOTES
ANTICOAGULATION MANAGEMENT     Alvin Ontiveros 67 year old male is on warfarin with therapeutic INR result. (Goal INR 2.0-3.0)    Recent labs: (last 7 days)     06/05/23  1009   INR 2.9*         ASSESSMENT     Warfarin Lab Questionnaire    Warfarin Doses Last 7 Days--patient states he filled out the dosing below incorrectly, Patient confirmed taking warfarin maintenance dose as listed        6/5/2023    10:02 AM   Dose in Tablet or Mg   TAB or MG? tablet (tab)     Pt Rptd Dose SUNDAY MONDAY TUESDAY WED THURS FRIDAY SATURDAY 6/5/2023  10:02 AM 0.25 0.5 0.5 0.5 0.5 0.25 0.5         6/5/2023   Warfarin Lab Questionnaire   Missed doses within past 14 days? No   Changes in diet or alcohol within past 14 days? No   Medication changes since last result? No-yes, took Valtrex 5/11-5/18/23 for shingles    Injuries or illness since last result? No-yes, recently treated for shingles, no longer symptomatic.   New shortness of breath, severe headaches or sudden changes in vision since last result? No   Abnormal bleeding since last result? No   Upcoming surgery, procedure? No        Previous result: Subtherapeutic  Additional findings: leaving for [place] on 6/24/23, will check INR prior to leaving since he will be driving for a long period.    Neurosurgery follow up office visit today for spinal stenosis, notes state patient is to follow up with PCP for elevated blood pressure.       PLAN     Recommended plan for no diet, medication or health factor changes affecting INR     Dosing Instructions: Continue your current warfarin dose with next INR in 2 1/2 weeks       Summary  As of 6/5/2023    Full warfarin instructions:  2.5 mg every Sun, Fri; 5 mg all other days   Next INR check:  6/23/2023             Telephone call with Parmjit who agrees to plan and repeated back plan correctly    Lab visit scheduled    Education provided:     Contact [phone]  with any changes, questions or concerns.     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

## 2023-06-05 NOTE — PROGRESS NOTES
67 year old male with lumbar stenosis.  He developed shingles in early May.  He was then going to travel to [place], and his wife called a nursing line for guidance/clearance.  He described some left foot numbness, and was recommended to go to the ED to rule out stroke.  He notes a few months of some left foot numbness and intermittent mild pain.  No recent pain and good overall functionality.  MR Lumbar, personally reviewed, with L3-4 moderate/severe central stenosis and L4-5 and L5-S1 left lateral recess stenosis.       Past Medical History:   Diagnosis Date     Chest pain, unspecified     Abstracted 05/17/02     Esophageal reflux     Abstracted 05/17/02     Gastro-oesophageal reflux disease      Hypertension      Other and unspecified alcohol dependence, unspecified drinking behavior     Abstracted 05/17/02     Other and unspecified coagulation defects 9/27/2006    Heterozygous for both Factor V Leiden and Factor II [id number] gene mutations.     Personal history of venous thrombosis and embolism      Pneumonia, organism unspecified(486) 1/2006    Hospitalized 1/2006 with pleuritic chest pain     Pulmonary embolism (H) 2/2006     Thrombosis of leg 2005     TINY PULMONARY NODULES, PRESUMED BENIGN 9/27/2006     Tobacco use disorder     Abstracted 05/17/02     Past Surgical History:   Procedure Laterality Date     C UNLISTED PROCEDURE, ABDOMEN/PERITONEUM/OMENTUM      Description: Hernia Repair;  Recorded: 03/24/2008;     COLONOSCOPY  04/28/2016    One adenomatous polyp removed by Ariana Flyod, [facility], repeat in 2021     HC REMOVAL OF TONSILS,<11 Y/O  1962    Tonsils <11y.o.     HC REMOVAL OF TONSILS,<11 Y/O      Description: Tonsillectomy;  Recorded: 03/24/2008;     REMOVE FOREIGN BODY HAND  1/30/2013    Procedure: REMOVE FOREIGN BODY HAND;  Left Thumb Foreign Body Removal ;  Surgeon: Blaine Willis MD;  Location:  OR     [facility] COLONOSCOPY THRU STOMA, DIAGNOSTIC  8/14/2006     Normal--repeat in 10 years.     [facility] REPAIR INCISIONAL HERNIA,REDUCIBLE  2004    Hernia Repair, Incisional, Unilateral, also umbilical hernia repair     Social History     Socioeconomic History     Marital status:      Spouse name: Maryam     Number of children: 5     Years of education: Not on file     Highest education level: Not on file   Occupational History     Occupation: [employer]      Employer: [email or web address]   Tobacco Use     Smoking status: Former     Packs/day: 1.00     Years: 30.00     Pack years: 30.00     Types: Cigarettes     Quit date: 2005     Years since quittin.5     Smokeless tobacco: Never   Vaping Use     Vaping status: Never Used   Substance and Sexual Activity     Alcohol use: Yes     Drug use: No     Sexual activity: Yes     Partners: Female   Other Topics Concern     Parent/sibling w/ CABG, MI or angioplasty before 65F 55M? Not Asked   Social History Narrative    , three biologic children, two stepchildren    All five children living in town.     Nine grandchildren.     Works in [employer] for [employer], anticipates correction in 2023     Social Determinants of Health     Financial Resource Strain: Low Risk  (2022)    Overall Financial Resource Strain (CARDIA)      Difficulty of Paying Living Expenses: Not hard at all   Food Insecurity: No Food Insecurity (2022)    Hunger Vital Sign      Worried About Running Out of Food in the Last Year: Never true      Ran Out of Food in the Last Year: Never true   Transportation Needs: No Transportation Needs (2022)    PRAPARE - Transportation      Lack of Transportation (Medical): No      Lack of Transportation (Non-Medical): No   Physical Activity: Sufficiently Active (2022)    Exercise Vital Sign      Days of Exercise per Week: 5 days      Minutes of Exercise per Session: 40 min   Stress: No Stress Concern Present (2021)    [place] [employer] - Occupational Stress Questionnaire      Feeling of  Stress : Only a little   Social Connections: Moderately Isolated (2021)    Social Connection and Isolation Panel [NHANES]      Frequency of Communication with Friends and Family: Once a week      Frequency of Social Gatherings with Friends and Family: Once a week      Attends Latter-day Services: 1 to 4 times per year      Active Member of Clubs or Organizations: No      Attends Club or Organization Meetings: Not on file      Marital Status:    Intimate Partner Violence: Not At Risk (2022)    Humiliation, Afraid, Rape, and Kick questionnaire      Fear of Current or Ex-Partner: No      Emotionally Abused: No      Physically Abused: No      Sexually Abused: No   Housing Stability: Low Risk  (2022)    Housing Stability Vital Sign      Unable to Pay for Housing in the Last Year: No      Number of Places Lived in the Last Year: 1      Unstable Housing in the Last Year: No     Family History   Problem Relation Age of Onset     Family History Negative Mother         Born . Lives in assisted living.     Heart Disease Father          age 86. MI at age 67, again at age 80.      Gastrointestinal Disease Father         PUD requiring surgery in the 's.      Family History Negative Sister      Family History Negative Sister      Family History Negative Sister          in MVA at 11 yo     Cerebrovascular Disease Brother         Born . Stroke at age 8 yrs old. Has reduced use of his right arm.      Family History Negative Brother          in infancy        ROS: 10 point ROS neg other than the symptoms noted above in the HPI.    Physical Exam  BP (!) 155/80   Pulse 64   SpO2 98%   HEENT:  Normocephalic, atraumatic.  PERRLA.  EOM s intact.  Visual fields full to gross exam  Neck:  Supple, non-tender, without lymphadenopathy.  Heart:  No peripheral edema  Lungs:  No SOB  Abdomen:  Non-distended.   Skin:  Warm and dry.  Extremities:  No edema, cyanosis or clubbing.  Psychiatric:  No  apparent distress  Musculoskeletal:  Normal bulk and tone    NEUROLOGICAL EXAMINATION:     Mental status:  Alert and Oriented x 3, speech is fluent.  Cranial nerves:  II-XII intact.   Motor:    Shoulder Abduction:  Right:  5/5   Left:  5/5  Biceps:                      Right:  5/5   Left:  5/5  Triceps:                     Right:  5/5   Left:  5/5  Wrist Extensors:       Right:  5/5   Left:  5/5  Wrist Flexors:           Right:  5/5   Left:  5/5  interosseus :            Right:  5/5   Left:  5/5  Hip Flexion:                Right: 5/5  Left:  5/5  Quadriceps:             Right:  5/5  Left:  5/5  Hamstrings:             Right:  5/5  Left:  5/5  Gastroc Soleus:        Right:  5/5  Left:  5/5  Tib/Ant:                      Right:  5/5  Left:  5/5  EHL:                     Right:  5/5  Left:  5/5  Sensation:  Left shin/foot numbness  Reflexes:  Negative Babinski.  Negative Clonus.  Negative Diaz's.  Coordination:  Smooth finger to nose testing.   Negative pronator drift.  Smooth tandem walking.    A/P:  67 year old male with lumbar stenosis    I had a discussion with the patient, reviewing the history, symptoms, and imaging  Very mild overall symptoms  If worsening symptoms, he will contact us and we could consider PT or VERONICA

## 2023-06-05 NOTE — LETTER
6/5/2023         RE: Alvin Ontiveros  [address] [place] [address]        Dear Colleague,    Thank you for referring your patient, Alvin Ontiveros, to the [facility]. Please see a copy of my visit note below.    67 year old male with lumbar stenosis.  He developed shingles in early May.  He was then going to travel to [place], and his wife called a nursing line for guidance/clearance.  He described some left foot numbness, and was recommended to go to the ED to rule out stroke.  He notes a few months of some left foot numbness and intermittent mild pain.  No recent pain and good overall functionality.  MR Lumbar, personally reviewed, with L3-4 moderate/severe central stenosis and L4-5 and L5-S1 left lateral recess stenosis.       Past Medical History:   Diagnosis Date     Chest pain, unspecified     Abstracted 05/17/02     Esophageal reflux     Abstracted 05/17/02     Gastro-oesophageal reflux disease      Hypertension      Other and unspecified alcohol dependence, unspecified drinking behavior     Abstracted 05/17/02     Other and unspecified coagulation defects 9/27/2006    Heterozygous for both Factor V Leiden and Factor II [id number] gene mutations.     Personal history of venous thrombosis and embolism      Pneumonia, organism unspecified(486) 1/2006    Hospitalized 1/2006 with pleuritic chest pain     Pulmonary embolism (H) 2/2006     Thrombosis of leg 2005     TINY PULMONARY NODULES, PRESUMED BENIGN 9/27/2006     Tobacco use disorder     Abstracted 05/17/02     Past Surgical History:   Procedure Laterality Date     C UNLISTED PROCEDURE, ABDOMEN/PERITONEUM/OMENTUM      Description: Hernia Repair;  Recorded: 03/24/2008;     COLONOSCOPY  04/28/2016    One adenomatous polyp removed by Ariana Floyd, [facility], repeat in 2021     HC REMOVAL OF TONSILS,<11 Y/O  1962    Tonsils <11y.o.     HC REMOVAL OF TONSILS,<11 Y/O       Description: Tonsillectomy;  Recorded: 2008;     REMOVE FOREIGN BODY HAND  2013    Procedure: REMOVE FOREIGN BODY HAND;  Left Thumb Foreign Body Removal ;  Surgeon: Blaine Willis MD;  Location: [facility] OR     [facility] COLONOSCOPY THRU STOMA, DIAGNOSTIC  2006    Normal--repeat in 10 years.     [facility] REPAIR INCISIONAL HERNIA,REDUCIBLE  2004    Hernia Repair, Incisional, Unilateral, also umbilical hernia repair     Social History     Socioeconomic History     Marital status:      Spouse name: Maryam     Number of children: 5     Years of education: Not on file     Highest education level: Not on file   Occupational History     Occupation: [employer]      Employer: [employer]   Tobacco Use     Smoking status: Former     Packs/day: 1.00     Years: 30.00     Pack years: 30.00     Types: Cigarettes     Quit date: 2005     Years since quittin.5     Smokeless tobacco: Never   Vaping Use     Vaping status: Never Used   Substance and Sexual Activity     Alcohol use: Yes     Drug use: No     Sexual activity: Yes     Partners: Female   Other Topics Concern     Parent/sibling w/ CABG, MI or angioplasty before 65F 55M? Not Asked   Social History Narrative    , three biologic children, two stepchildren    All five children living in town.     Nine grandchildren.     Works in sales for [employer], anticipates long term in 2023     Social Determinants of Health     Financial Resource Strain: Low Risk  (2022)    Overall Financial Resource Strain (CARDIA)      Difficulty of Paying Living Expenses: Not hard at all   Food Insecurity: No Food Insecurity (2022)    Hunger Vital Sign      Worried About Running Out of Food in the Last Year: Never true      Ran Out of Food in the Last Year: Never true   Transportation Needs: No Transportation Needs (2022)    PRAPARE - Transportation      Lack of Transportation (Medical): No      Lack of Transportation (Non-Medical): No   Physical Activity:  Sufficiently Active (2022)    Exercise Vital Sign      Days of Exercise per Week: 5 days      Minutes of Exercise per Session: 40 min   Stress: No Stress Concern Present (2021)    [place] of Occupational Health - Occupational Stress Questionnaire      Feeling of Stress : Only a little   Social Connections: Moderately Isolated (2021)    Social Connection and Isolation Panel [NHANES]      Frequency of Communication with Friends and Family: Once a week      Frequency of Social Gatherings with Friends and Family: Once a week      Attends Confucianist Services: 1 to 4 times per year      Active Member of Clubs or Organizations: No      Attends Club or Organization Meetings: Not on file      Marital Status:    Intimate Partner Violence: Not At Risk (2022)    Humiliation, Afraid, Rape, and Kick questionnaire      Fear of Current or Ex-Partner: No      Emotionally Abused: No      Physically Abused: No      Sexually Abused: No   Housing Stability: Low Risk  (2022)    Housing Stability Vital Sign      Unable to Pay for Housing in the Last Year: No      Number of Places Lived in the Last Year: 1      Unstable Housing in the Last Year: No     Family History   Problem Relation Age of Onset     Family History Negative Mother         Born 1923. Lives in assisted living.     Heart Disease Father          age 86. MI at age 67, again at age 80.      Gastrointestinal Disease Father         PUD requiring surgery in the s.      Family History Negative Sister      Family History Negative Sister      Family History Negative Sister          in MVA at 11 yo     Cerebrovascular Disease Brother         Born . Stroke at age 8 yrs old. Has reduced use of his right arm.      Family History Negative Brother          in infancy        ROS: 10 point ROS neg other than the symptoms noted above in the HPI.    Physical Exam  BP (!) 155/80   Pulse 64   SpO2 98%   HEENT:  Normocephalic,  atraumatic.  PERRLA.  EOM s intact.  Visual fields full to gross exam  Neck:  Supple, non-tender, without lymphadenopathy.  Heart:  No peripheral edema  Lungs:  No SOB  Abdomen:  Non-distended.   Skin:  Warm and dry.  Extremities:  No edema, cyanosis or clubbing.  Psychiatric:  No apparent distress  Musculoskeletal:  Normal bulk and tone    NEUROLOGICAL EXAMINATION:     Mental status:  Alert and Oriented x 3, speech is fluent.  Cranial nerves:  II-XII intact.   Motor:    Shoulder Abduction:  Right:  5/5   Left:  5/5  Biceps:                      Right:  5/5   Left:  5/5  Triceps:                     Right:  5/5   Left:  5/5  Wrist Extensors:       Right:  5/5   Left:  5/5  Wrist Flexors:           Right:  5/5   Left:  5/5  interosseus :            Right:  5/5   Left:  5/5  Hip Flexion:                Right: 5/5  Left:  5/5  Quadriceps:             Right:  5/5  Left:  5/5  Hamstrings:             Right:  5/5  Left:  5/5  Gastroc Soleus:        Right:  5/5  Left:  5/5  Tib/Ant:                      Right:  5/5  Left:  5/5  EHL:                     Right:  5/5  Left:  5/5  Sensation:  Left shin/foot numbness  Reflexes:  Negative Babinski.  Negative Clonus.  Negative Diaz's.  Coordination:  Smooth finger to nose testing.   Negative pronator drift.  Smooth tandem walking.    A/P:  67 year old male with lumbar stenosis    I had a discussion with the patient, reviewing the history, symptoms, and imaging  Very mild overall symptoms  If worsening symptoms, he will contact us and we could consider PT or VERONICA         Again, thank you for allowing me to participate in the care of your patient.        Sincerely,        Oumar Hoffmann MD

## 2023-06-05 NOTE — NURSING NOTE
"Alvin Ontiveros is a 67 year old male who presents for:  Chief Complaint   Patient presents with     RECHECK     Spinal stenosis of lumbar region        Initial Vitals:  BP (!) 155/80   Pulse 64   SpO2 98%  Estimated body mass index is 29.69 kg/m  as calculated from the following:    Height as of 5/19/23: 6' 1\" (1.854 m).    Weight as of 5/19/23: 225 lb (102.1 kg).. There is no height or weight on file to calculate BSA. BP completed using cuff size: large  Data Unavailable    Nursing Comments: Parmjit to follow up with Primary Care provider regarding elevated blood pressure.      Flash Barragan MA    "

## 2023-06-23 ENCOUNTER — ANTICOAGULATION THERAPY VISIT (OUTPATIENT)
Dept: ANTICOAGULATION | Facility: CLINIC | Age: 68
End: 2023-06-23

## 2023-06-23 ENCOUNTER — LAB (OUTPATIENT)
Dept: LAB | Facility: CLINIC | Age: 68
End: 2023-06-23
Payer: COMMERCIAL

## 2023-06-23 DIAGNOSIS — Z86.718 PERSONAL HISTORY OF VENOUS THROMBOSIS AND EMBOLISM: ICD-10-CM

## 2023-06-23 DIAGNOSIS — Z79.01 LONG TERM CURRENT USE OF ANTICOAGULANT THERAPY: ICD-10-CM

## 2023-06-23 DIAGNOSIS — D68.51 HETEROZYGOUS FACTOR V LEIDEN MUTATION (H): ICD-10-CM

## 2023-06-23 DIAGNOSIS — D68.52 PROTHROMBIN GENE MUTATION (H): ICD-10-CM

## 2023-06-23 DIAGNOSIS — Z79.01 LONG TERM CURRENT USE OF ANTICOAGULANT THERAPY: Primary | ICD-10-CM

## 2023-06-23 LAB — INR BLD: 2.5 (ref 0.9–1.1)

## 2023-06-23 PROCEDURE — 36416 COLLJ CAPILLARY BLOOD SPEC: CPT

## 2023-06-23 PROCEDURE — 85610 PROTHROMBIN TIME: CPT

## 2023-06-23 NOTE — PROGRESS NOTES
ANTICOAGULATION MANAGEMENT     Alvin Ontiveros 67 year old male is on warfarin with therapeutic INR result. (Goal INR 2.0-3.0)    Recent labs: (last 7 days)     06/23/23  0851   INR 2.5*       ASSESSMENT     Warfarin Lab Questionnaire    Warfarin Doses Last 7 Days      6/23/2023     8:49 AM   Dose in Tablet or Mg   TAB or MG? tablet (tab)     Pt Rptd Dose SUNDAY MONDAY TUESDAY WED THURS FRIDAY SATURDAY 6/23/2023   8:49 AM 0.5 1 1 1 1 0.5 1         6/23/2023   Warfarin Lab Questionnaire   Missed doses within past 14 days? No   Changes in diet or alcohol within past 14 days? No   Medication changes since last result? No   Injuries or illness since last result? No   New shortness of breath, severe headaches or sudden changes in vision since last result? No   Abnormal bleeding since last result? No   Upcoming surgery, procedure? No   Best number to call with results? [phone]     Previous result: Therapeutic last visit; previously outside of goal range  Additional findings: None       PLAN     Recommended plan for no diet, medication or health factor changes affecting INR.  Recommended a recheck in 4 weeks, but patient prefers to go 6 weeks.  INR is typically stable and was most recently out of range due to possible temporary diet changes so agreed that 6 week recheck could be done.      Dosing Instructions: Continue your current warfarin dose with next INR in 6 weeks       Summary  As of 6/23/2023    Full warfarin instructions:  2.5 mg every Sun, Fri; 5 mg all other days   Next INR check:  8/7/2023             Telephone call with Parmjit who agrees to plan and repeated back plan correctly    Lab visit scheduled    Education provided:     Please call back if any changes to your diet, medications or how you've been taking warfarin    Plan made per [facility] anticoagulation protocol    Kari Lizarraga, RN  Anticoagulation Clinic  6/23/2023    _______________________________________________________________________      Anticoagulation Episode Summary     Current INR goal:  2.0-3.0   TTR:  79.1 % (1 y)   Target end date:  Indefinite   Send INR reminders to:  [facility]    Indications    Long-term (current) use of anticoagulants [Z79.01] [Z79.01]  Heterozygous factor V Leiden mutation (H) [D68.51]  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

## 2023-07-08 ENCOUNTER — OFFICE VISIT (OUTPATIENT)
Dept: URGENT CARE | Facility: URGENT CARE | Age: 68
End: 2023-07-08
Payer: COMMERCIAL

## 2023-07-08 VITALS
TEMPERATURE: 98 F | WEIGHT: 244.6 LBS | HEART RATE: 63 BPM | DIASTOLIC BLOOD PRESSURE: 85 MMHG | RESPIRATION RATE: 16 BRPM | OXYGEN SATURATION: 98 % | SYSTOLIC BLOOD PRESSURE: 155 MMHG | BODY MASS INDEX: 32.27 KG/M2

## 2023-07-08 DIAGNOSIS — J01.80 ACUTE NON-RECURRENT SINUSITIS OF OTHER SINUS: Primary | ICD-10-CM

## 2023-07-08 PROCEDURE — 99213 OFFICE O/P EST LOW 20 MIN: CPT | Performed by: INTERNAL MEDICINE

## 2023-07-08 RX ORDER — AZITHROMYCIN 250 MG/1
TABLET, FILM COATED ORAL
Qty: 6 TABLET | Refills: 0 | Status: SHIPPED | OUTPATIENT
Start: 2023-07-08 | End: 2023-07-13

## 2023-07-08 RX ORDER — FLUTICASONE PROPIONATE 50 MCG
1 SPRAY, SUSPENSION (ML) NASAL DAILY
Qty: 11 ML | Refills: 0 | Status: SHIPPED | OUTPATIENT
Start: 2023-07-08 | End: 2023-12-01

## 2023-07-08 NOTE — PROGRESS NOTES
Assessment & Plan     Acute non-recurrent sinusitis of other sinus  - fluticasone (FLONASE) 50 MCG/ACT nasal spray; Spray 1 spray into both nostrils daily  - azithromycin (ZITHROMAX) 250 MG tablet; Take 2 tablets (500 mg) by mouth daily for 1 day, THEN 1 tablet (250 mg) daily for 4 days.    Du Pagan MD  [facility] Parnell is a 67 year old, presenting for the following health issues:  URI (Start ongoing for 2 weeks  sx runny nose,head congestion, sometime productive cough, violent cough, not sleeping, fatigue tx rest)        5/11/2023     4:39 PM   Additional Questions   Roomed by xl     HPI   Chief complaint of cough. He had URI two weeks ago and the cough is now lingering.  Some lingering facial pressure.  More cough with talking.  Denies use of OTC medications.  Constant sense of post nasal drip and blowing the nose.     Review of Systems   Constitutional, HEENT, cardiovascular, pulmonary, gi and gu systems are negative, except as otherwise noted.      Objective    BP (!) 155/85   Pulse 63   Temp 98  F (36.7  C)   Resp 16   Wt 110.9 kg (244 lb 9.6 oz)   SpO2 98%   BMI 32.27 kg/m    Body mass index is 32.27 kg/m .  Physical Exam   GENERAL APPEARANCE: healthy, alert and no distress  HENT: ear canals and TM's normal and cobblestoning of the posterior pharynx with post-nasal drainage   NECK: no adenopathy and no asymmetry, masses, or scars  RESP: lungs clear to auscultation - no rales, rhonchi or wheezes

## 2023-07-10 ENCOUNTER — ANTICOAGULATION THERAPY VISIT (OUTPATIENT)
Dept: ANTICOAGULATION | Facility: CLINIC | Age: 68
End: 2023-07-10

## 2023-07-10 ENCOUNTER — LAB (OUTPATIENT)
Dept: LAB | Facility: CLINIC | Age: 68
End: 2023-07-10
Payer: COMMERCIAL

## 2023-07-10 DIAGNOSIS — Z86.718 PERSONAL HISTORY OF VENOUS THROMBOSIS AND EMBOLISM: ICD-10-CM

## 2023-07-10 DIAGNOSIS — Z79.01 LONG TERM CURRENT USE OF ANTICOAGULANT THERAPY: ICD-10-CM

## 2023-07-10 DIAGNOSIS — D68.52 PROTHROMBIN GENE MUTATION (H): ICD-10-CM

## 2023-07-10 DIAGNOSIS — D68.51 HETEROZYGOUS FACTOR V LEIDEN MUTATION (H): ICD-10-CM

## 2023-07-10 DIAGNOSIS — Z79.01 LONG TERM CURRENT USE OF ANTICOAGULANT THERAPY: Primary | ICD-10-CM

## 2023-07-10 LAB — INR BLD: 3.1 (ref 0.9–1.1)

## 2023-07-10 PROCEDURE — 85610 PROTHROMBIN TIME: CPT

## 2023-07-10 PROCEDURE — 36416 COLLJ CAPILLARY BLOOD SPEC: CPT

## 2023-07-19 ENCOUNTER — ANTICOAGULATION THERAPY VISIT (OUTPATIENT)
Dept: ANTICOAGULATION | Facility: CLINIC | Age: 68
End: 2023-07-19

## 2023-07-19 ENCOUNTER — LAB (OUTPATIENT)
Dept: LAB | Facility: CLINIC | Age: 68
End: 2023-07-19
Payer: COMMERCIAL

## 2023-07-19 DIAGNOSIS — D68.51 HETEROZYGOUS FACTOR V LEIDEN MUTATION (H): ICD-10-CM

## 2023-07-19 DIAGNOSIS — Z79.01 LONG TERM CURRENT USE OF ANTICOAGULANT THERAPY: Primary | ICD-10-CM

## 2023-07-19 DIAGNOSIS — D68.52 PROTHROMBIN GENE MUTATION (H): ICD-10-CM

## 2023-07-19 DIAGNOSIS — Z86.718 PERSONAL HISTORY OF VENOUS THROMBOSIS AND EMBOLISM: ICD-10-CM

## 2023-07-19 DIAGNOSIS — Z79.01 LONG TERM CURRENT USE OF ANTICOAGULANT THERAPY: ICD-10-CM

## 2023-07-19 LAB — INR BLD: 2.4 (ref 0.9–1.1)

## 2023-07-19 PROCEDURE — 85610 PROTHROMBIN TIME: CPT

## 2023-07-19 PROCEDURE — 36416 COLLJ CAPILLARY BLOOD SPEC: CPT

## 2023-07-19 NOTE — PROGRESS NOTES
ANTICOAGULATION MANAGEMENT     Alvin Ontiveros 67 year old male is on warfarin with therapeutic INR result. (Goal INR 2.0-3.0)    Recent labs: (last 7 days)     07/19/23  1134   INR 2.4*       ASSESSMENT       Source(s): Chart Review    Previous INR was Supratherapeutic    Medication, diet, health changes since last INR chart reviewed; none identified     At last INR check, patient was recovering from sinusitis and still had a slight cough.    Will be leaving for a [employer] fishing trip 7/20/23.             PLAN     Recommended plan for no diet, medication or health factor changes affecting INR     Dosing Instructions: Continue your current warfarin dose with next INR in 2 weeks       Summary  As of 7/19/2023    Full warfarin instructions:  2.5 mg every Sun, Fri; 5 mg all other days   Next INR check:  8/7/2023             Detailed voicemail left for Parmjit with warfarin dosing instructions and next INR date to check.    Lab visit scheduled    Education provided:     Please call back if any changes to your diet, medications or how you've been taking warfarin    Plan made per 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

## 2023-08-07 ENCOUNTER — ANTICOAGULATION THERAPY VISIT (OUTPATIENT)
Dept: ANTICOAGULATION | Facility: CLINIC | Age: 68
End: 2023-08-07

## 2023-08-07 ENCOUNTER — LAB (OUTPATIENT)
Dept: LAB | Facility: CLINIC | Age: 68
End: 2023-08-07
Payer: COMMERCIAL

## 2023-08-07 DIAGNOSIS — D68.51 HETEROZYGOUS FACTOR V LEIDEN MUTATION (H): ICD-10-CM

## 2023-08-07 DIAGNOSIS — Z79.01 LONG TERM CURRENT USE OF ANTICOAGULANT THERAPY: ICD-10-CM

## 2023-08-07 DIAGNOSIS — D68.52 PROTHROMBIN GENE MUTATION (H): ICD-10-CM

## 2023-08-07 DIAGNOSIS — Z86.718 PERSONAL HISTORY OF VENOUS THROMBOSIS AND EMBOLISM: ICD-10-CM

## 2023-08-07 LAB — INR BLD: 2.3 (ref 0.9–1.1)

## 2023-08-07 PROCEDURE — 36416 COLLJ CAPILLARY BLOOD SPEC: CPT

## 2023-08-07 PROCEDURE — 85610 PROTHROMBIN TIME: CPT

## 2023-08-07 NOTE — PROGRESS NOTES
ANTICOAGULATION MANAGEMENT     Alvin Ontiveros 67 year old male is on warfarin with therapeutic INR result. (Goal INR 2.0-3.0)    Recent labs: (last 7 days)     08/07/23  0901   INR 2.3*       ASSESSMENT     Source(s): Chart Review and Patient/Caregiver Call     Warfarin doses taken: Warfarin taken as instructed  Diet: No new diet changes identified  Medication/supplement changes: None noted  New illness, injury, or hospitalization: No  Signs or symptoms of bleeding or clotting: No  Previous result: Therapeutic last visit; previously outside of goal range  Additional findings: None  Pt is back to baseline since episode of sinusitis       PLAN     Recommended plan for no diet, medication or health factor changes affecting INR     Dosing Instructions: Continue your current warfarin dose with next INR in 4 weeks   (Pt agreed to return in 5 weeks for next INR check)    Summary  As of 8/7/2023      Full warfarin instructions:  2.5 mg every Sun, Fri; 5 mg all other days   Next INR check:  9/11/2023               Telephone call with Parmjit who verbalizes understanding and agrees to plan    Lab visit scheduled    Education provided:   Contact [phone]  with any changes, questions or concerns.     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

## 2023-08-09 ENCOUNTER — DOCUMENTATION ONLY (OUTPATIENT)
Dept: INTERNAL MEDICINE | Facility: CLINIC | Age: 68
End: 2023-08-09
Payer: COMMERCIAL

## 2023-08-09 DIAGNOSIS — Z86.718 PERSONAL HISTORY OF VENOUS THROMBOSIS AND EMBOLISM: ICD-10-CM

## 2023-08-09 DIAGNOSIS — Z79.01 LONG TERM CURRENT USE OF ANTICOAGULANT THERAPY: Primary | ICD-10-CM

## 2023-08-09 NOTE — PROGRESS NOTES
ANTICOAGULATION CLINIC REFERRAL RENEWAL REQUEST       An annual renewal order is required for all patients referred to [facility].?  Please review and sign the pended referral order for Alvin Ontiveros.       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 Goal INR 2-3, standard for indication(s) above     Time in Therapeutic Range (TTR)  (Goal > 60%) 78%       Office visit with referring provider's group within last year yes on 5/11/23       Ana Maria Owusu RN  [facility]

## 2023-09-11 ENCOUNTER — LAB (OUTPATIENT)
Dept: LAB | Facility: CLINIC | Age: 68
End: 2023-09-11
Payer: COMMERCIAL

## 2023-09-11 ENCOUNTER — ANTICOAGULATION THERAPY VISIT (OUTPATIENT)
Dept: ANTICOAGULATION | Facility: CLINIC | Age: 68
End: 2023-09-11

## 2023-09-11 DIAGNOSIS — Z86.718 PERSONAL HISTORY OF VENOUS THROMBOSIS AND EMBOLISM: ICD-10-CM

## 2023-09-11 DIAGNOSIS — D68.52 PROTHROMBIN GENE MUTATION (H): ICD-10-CM

## 2023-09-11 DIAGNOSIS — Z79.01 LONG TERM CURRENT USE OF ANTICOAGULANT THERAPY: ICD-10-CM

## 2023-09-11 DIAGNOSIS — Z79.01 LONG TERM CURRENT USE OF ANTICOAGULANT THERAPY: Primary | ICD-10-CM

## 2023-09-11 DIAGNOSIS — D68.51 HETEROZYGOUS FACTOR V LEIDEN MUTATION (H): ICD-10-CM

## 2023-09-11 LAB — INR BLD: 3 (ref 0.9–1.1)

## 2023-09-11 PROCEDURE — 85610 PROTHROMBIN TIME: CPT

## 2023-09-11 PROCEDURE — 36416 COLLJ CAPILLARY BLOOD SPEC: CPT

## 2023-09-11 NOTE — PROGRESS NOTES
ANTICOAGULATION MANAGEMENT     Alvin Ontiveros 68 year old male is on warfarin with therapeutic INR result. (Goal INR 2.0-3.0)    Recent labs: (last 7 days)     09/11/23  0851   INR 3.0*       ASSESSMENT     Source(s): Chart Review and Patient/Caregiver Call     Warfarin doses taken: Warfarin taken as instructed  Diet: Decreased greens/vitamin K in diet; plans to resume previous intake  Medication/supplement changes: None noted  New illness, injury, or hospitalization: No  Signs or symptoms of bleeding or clotting: No  Previous result: Therapeutic last 2(+) visits  Additional findings: None       PLAN     Recommended plan for temporary change(s) affecting INR     Dosing Instructions: Continue your current warfarin dose with next INR in 6 weeks       Summary  As of 9/11/2023      Full warfarin instructions:  2.5 mg every Sun, Fri; 5 mg all other days   Next INR check:  10/23/2023               Telephone call with Parmjit who verbalizes understanding and agrees to plan    Lab visit scheduled    Education provided:   Please call back if any changes to your diet, medications or how you've been taking warfarin  Contact [phone]  with any changes, questions or concerns.     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

## 2023-10-23 ENCOUNTER — ANTICOAGULATION THERAPY VISIT (OUTPATIENT)
Dept: ANTICOAGULATION | Facility: CLINIC | Age: 68
End: 2023-10-23

## 2023-10-23 ENCOUNTER — LAB (OUTPATIENT)
Dept: LAB | Facility: CLINIC | Age: 68
End: 2023-10-23
Payer: COMMERCIAL

## 2023-10-23 DIAGNOSIS — D68.52 PROTHROMBIN GENE MUTATION (H): ICD-10-CM

## 2023-10-23 DIAGNOSIS — Z86.718 PERSONAL HISTORY OF VENOUS THROMBOSIS AND EMBOLISM: ICD-10-CM

## 2023-10-23 DIAGNOSIS — D68.51 HETEROZYGOUS FACTOR V LEIDEN MUTATION (H): ICD-10-CM

## 2023-10-23 DIAGNOSIS — Z79.01 LONG TERM CURRENT USE OF ANTICOAGULANT THERAPY: ICD-10-CM

## 2023-10-23 DIAGNOSIS — Z79.01 LONG TERM CURRENT USE OF ANTICOAGULANT THERAPY: Primary | ICD-10-CM

## 2023-10-23 LAB — INR BLD: 1.8 (ref 0.9–1.1)

## 2023-10-23 PROCEDURE — 36416 COLLJ CAPILLARY BLOOD SPEC: CPT

## 2023-10-23 PROCEDURE — 85610 PROTHROMBIN TIME: CPT

## 2023-10-23 NOTE — PROGRESS NOTES
ANTICOAGULATION MANAGEMENT     Alvin Ontiveros 68 year old male is on warfarin with subtherapeutic INR result. (Goal INR 2.0-3.0)    Recent labs: (last 7 days)     10/23/23  0900   INR 1.8*       ASSESSMENT     Warfarin Lab Questionnaire    Warfarin Doses Last 7 Days      10/23/2023     8:49 AM   Dose in Tablet or Mg   TAB or MG? tablet (tab)     Pt Rptd Dose BETO MONDAY TUESDAY WED THURS FRIDAY SATURDAY   10/23/2023   8:49 AM 0.05 5 5 5 5 0.05 5         10/23/2023   Warfarin Lab Questionnaire   Missed doses within past 14 days? No   Changes in diet or alcohol within past 14 days? No   Medication changes since last result? No   Injuries or illness since last result? No   New shortness of breath, severe headaches or sudden changes in vision since last result? No   Abnormal bleeding since last result? No   Upcoming surgery, procedure? No     Previous result: Therapeutic last 2(+) visits  Additional findings: None       PLAN     Recommended plan for no diet, medication or health factor changes affecting INR     Dosing Instructions: Continue your current warfarin dose with next INR in 2 weeks       Summary  As of 10/23/2023      Full warfarin instructions:  2.5 mg every Sun, Fri; 5 mg all other days   Next INR check:  11/6/2023               Telephone call with Parmjit who verbalizes understanding and agrees to plan    Lab visit scheduled    Education provided:   Contact [phone] with any changes, questions or concerns.     Plan made per [facility] anticoagulation protocol    Nalini Ivan RN  Anticoagulation Clinic  10/23/2023    _______________________________________________________________________     Anticoagulation Episode Summary       Current INR goal:  2.0-3.0   TTR:  80.3% (1 y)   Target end date:  Indefinite   Send INR reminders to:  [facility]    Indications    Long-term (current) use of anticoagulants [Z79.01] [Z79.01]  Heterozygous factor V Leiden mutation (H24) [D68.51]  Heterozygous  Prothrombin Gene Mutation [D68.52]  Personal history of venous thrombosis and embolism [Z86.718]             Comments:               Anticoagulation Care Providers       Provider Role Specialty Phone number    Du Almaraz MD Referring Internal Medicine [phone]

## 2023-11-06 ENCOUNTER — LAB (OUTPATIENT)
Dept: LAB | Facility: CLINIC | Age: 68
End: 2023-11-06
Payer: COMMERCIAL

## 2023-11-06 ENCOUNTER — ANTICOAGULATION THERAPY VISIT (OUTPATIENT)
Dept: ANTICOAGULATION | Facility: CLINIC | Age: 68
End: 2023-11-06

## 2023-11-06 DIAGNOSIS — Z86.718 PERSONAL HISTORY OF VENOUS THROMBOSIS AND EMBOLISM: ICD-10-CM

## 2023-11-06 DIAGNOSIS — Z79.01 LONG TERM CURRENT USE OF ANTICOAGULANT THERAPY: Primary | ICD-10-CM

## 2023-11-06 DIAGNOSIS — Z79.01 LONG TERM CURRENT USE OF ANTICOAGULANT THERAPY: ICD-10-CM

## 2023-11-06 DIAGNOSIS — D68.51 HETEROZYGOUS FACTOR V LEIDEN MUTATION (H): ICD-10-CM

## 2023-11-06 DIAGNOSIS — R73.03 PREDIABETES: ICD-10-CM

## 2023-11-06 DIAGNOSIS — D68.52 PROTHROMBIN GENE MUTATION (H): ICD-10-CM

## 2023-11-06 LAB — INR BLD: 2.3 (ref 0.9–1.1)

## 2023-11-06 PROCEDURE — 36416 COLLJ CAPILLARY BLOOD SPEC: CPT

## 2023-11-06 PROCEDURE — 85610 PROTHROMBIN TIME: CPT

## 2023-11-06 RX ORDER — FOLIC ACID 1 MG/1
1000 TABLET ORAL DAILY
Qty: 90 TABLET | Refills: 1 | Status: SHIPPED | OUTPATIENT
Start: 2023-11-06 | End: 2023-12-01

## 2023-11-06 NOTE — PROGRESS NOTES
ANTICOAGULATION MANAGEMENT     Alvin Ontiveros 68 year old male is on warfarin with therapeutic INR result. (Goal INR 2.0-3.0)    Recent labs: (last 7 days)     11/06/23  0900   INR 2.3*       ASSESSMENT     Warfarin Lab Questionnaire    Warfarin Doses Last 7 Days      11/6/2023     8:58 AM   Dose in Tablet or Mg   TAB or MG? tablet (tab)     Pt Rptd Dose SUNDAY MONDAY TUESDAY WED THURS FRIDAY SATURDAY 11/6/2023   8:58 AM 0.5 1 1 1 1 0.5 1         11/6/2023   Warfarin Lab Questionnaire   Missed doses within past 14 days? No   Changes in diet or alcohol within past 14 days? No   Medication changes since last result? No   Injuries or illness since last result? No   New shortness of breath, severe headaches or sudden changes in vision since last result? No   Abnormal bleeding since last result? No   Upcoming surgery, procedure? No   Best number to call with results? [phone]     Previous result: Subtherapeutic  Additional findings: None       PLAN     Recommended plan for no diet, medication or health factor changes affecting INR     Dosing Instructions: Continue your current warfarin dose with next INR in 3 weeks       Summary  As of 11/6/2023      Full warfarin instructions:  2.5 mg every Sun, Fri; 5 mg all other days   Next INR check:  11/27/2023               Telephone call with Parmjit who verbalizes understanding and agrees to plan    Pt already had a lab appointment scheduled,added to have INR completed during that appointment.      Education provided:   Goal range and lab monitoring: goal range and significance of current result and Importance of therapeutic range    Plan made per [facility] anticoagulation protocol    Pillo Zaidi RN  Anticoagulation Clinic  11/6/2023    _______________________________________________________________________     Anticoagulation Episode Summary       Current INR goal:  2.0-3.0   TTR:  82.6% (1 y)   Target end date:  Indefinite   Send INR reminders to:  [facility]     Indications    Long-term (current) use of anticoagulants [Z79.01] [Z79.01]  Heterozygous factor V Leiden mutation (H24) [D68.51]  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

## 2023-11-20 ENCOUNTER — DOCUMENTATION ONLY (OUTPATIENT)
Dept: LAB | Facility: CLINIC | Age: 68
End: 2023-11-20
Payer: COMMERCIAL

## 2023-11-20 NOTE — PROGRESS NOTES
Alvin Ontiveros has an upcoming lab appointment:    Future Appointments   Date Time Provider Department Center   11/22/2023  8:00 AM [facility]   12/1/2023  8:30 AM Du Almaraz MD [facility]     Patient is scheduled for the following lab(s): pre physical labs per appointment notes    There is no order available. Please review and place either future orders or HMPO (Review of Health Maintenance Protocol Orders), as appropriate.    Health Maintenance Due   Topic    TSH W/FREE T4 REFLEX     ANNUAL REVIEW OF HM ORDERS      Ashleigh Lozano

## 2023-11-22 ENCOUNTER — LAB (OUTPATIENT)
Dept: LAB | Facility: CLINIC | Age: 68
End: 2023-11-22
Payer: COMMERCIAL

## 2023-11-22 ENCOUNTER — ANTICOAGULATION THERAPY VISIT (OUTPATIENT)
Dept: ANTICOAGULATION | Facility: CLINIC | Age: 68
End: 2023-11-22

## 2023-11-22 DIAGNOSIS — Z86.718 PERSONAL HISTORY OF VENOUS THROMBOSIS AND EMBOLISM: ICD-10-CM

## 2023-11-22 DIAGNOSIS — E03.4 HYPOTHYROIDISM DUE TO ACQUIRED ATROPHY OF THYROID: Primary | ICD-10-CM

## 2023-11-22 DIAGNOSIS — Z00.00 ROUTINE GENERAL MEDICAL EXAMINATION AT A HEALTH CARE FACILITY: ICD-10-CM

## 2023-11-22 DIAGNOSIS — Z79.01 LONG TERM CURRENT USE OF ANTICOAGULANT THERAPY: Primary | ICD-10-CM

## 2023-11-22 DIAGNOSIS — Z79.01 LONG TERM CURRENT USE OF ANTICOAGULANT THERAPY: ICD-10-CM

## 2023-11-22 DIAGNOSIS — D68.52 PROTHROMBIN GENE MUTATION (H): ICD-10-CM

## 2023-11-22 DIAGNOSIS — D68.51 HETEROZYGOUS FACTOR V LEIDEN MUTATION (H): ICD-10-CM

## 2023-11-22 LAB
ALBUMIN SERPL BCG-MCNC: 3.9 G/DL (ref 3.5–5.2)
ALP SERPL-CCNC: 56 U/L (ref 40–150)
ALT SERPL W P-5'-P-CCNC: 22 U/L (ref 0–70)
ANION GAP SERPL CALCULATED.3IONS-SCNC: 4 MMOL/L (ref 7–15)
AST SERPL W P-5'-P-CCNC: 20 U/L (ref 0–45)
BASOPHILS # BLD AUTO: 0 10E3/UL (ref 0–0.2)
BASOPHILS NFR BLD AUTO: 1 %
BILIRUB SERPL-MCNC: 0.7 MG/DL
BUN SERPL-MCNC: 19.1 MG/DL (ref 8–23)
CALCIUM SERPL-MCNC: 10.6 MG/DL (ref 8.8–10.2)
CHLORIDE SERPL-SCNC: 104 MMOL/L (ref 98–107)
CHOLEST SERPL-MCNC: 177 MG/DL
CREAT SERPL-MCNC: 0.86 MG/DL (ref 0.67–1.17)
DEPRECATED HCO3 PLAS-SCNC: 30 MMOL/L (ref 22–29)
EGFRCR SERPLBLD CKD-EPI 2021: >90 ML/MIN/1.73M2
EOSINOPHIL # BLD AUTO: 0.1 10E3/UL (ref 0–0.7)
EOSINOPHIL NFR BLD AUTO: 4 %
ERYTHROCYTE [DISTWIDTH] IN BLOOD BY AUTOMATED COUNT: 12.7 % (ref 10–15)
GLUCOSE SERPL-MCNC: 120 MG/DL (ref 70–99)
HBA1C MFR BLD: 5.5 % (ref 0–5.6)
HCT VFR BLD AUTO: 42.7 % (ref 40–53)
HDLC SERPL-MCNC: 67 MG/DL
HGB BLD-MCNC: 14.7 G/DL (ref 13.3–17.7)
IMM GRANULOCYTES # BLD: 0 10E3/UL
IMM GRANULOCYTES NFR BLD: 0 %
INR BLD: 3.6 (ref 0.9–1.1)
LDLC SERPL CALC-MCNC: 87 MG/DL
LYMPHOCYTES # BLD AUTO: 1.4 10E3/UL (ref 0.8–5.3)
LYMPHOCYTES NFR BLD AUTO: 40 %
MCH RBC QN AUTO: 30.7 PG (ref 26.5–33)
MCHC RBC AUTO-ENTMCNC: 34.4 G/DL (ref 31.5–36.5)
MCV RBC AUTO: 89 FL (ref 78–100)
MONOCYTES # BLD AUTO: 0.3 10E3/UL (ref 0–1.3)
MONOCYTES NFR BLD AUTO: 8 %
NEUTROPHILS # BLD AUTO: 1.6 10E3/UL (ref 1.6–8.3)
NEUTROPHILS NFR BLD AUTO: 48 %
NONHDLC SERPL-MCNC: 110 MG/DL
PLATELET # BLD AUTO: 120 10E3/UL (ref 150–450)
POTASSIUM SERPL-SCNC: 3.9 MMOL/L (ref 3.4–5.3)
PROT SERPL-MCNC: 6.4 G/DL (ref 6.4–8.3)
PSA SERPL DL<=0.01 NG/ML-MCNC: 1.12 NG/ML (ref 0–4.5)
RBC # BLD AUTO: 4.79 10E6/UL (ref 4.4–5.9)
SODIUM SERPL-SCNC: 138 MMOL/L (ref 135–145)
TRIGL SERPL-MCNC: 115 MG/DL
TSH SERPL DL<=0.005 MIU/L-ACNC: 3.12 UIU/ML (ref 0.3–4.2)
WBC # BLD AUTO: 3.4 10E3/UL (ref 4–11)

## 2023-11-22 PROCEDURE — 85610 PROTHROMBIN TIME: CPT

## 2023-11-22 PROCEDURE — 36415 COLL VENOUS BLD VENIPUNCTURE: CPT

## 2023-11-22 PROCEDURE — 83036 HEMOGLOBIN GLYCOSYLATED A1C: CPT | Mod: GZ

## 2023-11-22 PROCEDURE — 84443 ASSAY THYROID STIM HORMONE: CPT

## 2023-11-22 PROCEDURE — 80061 LIPID PANEL: CPT

## 2023-11-22 PROCEDURE — G0103 PSA SCREENING: HCPCS

## 2023-11-22 PROCEDURE — 80053 COMPREHEN METABOLIC PANEL: CPT

## 2023-11-22 PROCEDURE — 85025 COMPLETE CBC W/AUTO DIFF WBC: CPT

## 2023-11-22 NOTE — PROGRESS NOTES
ANTICOAGULATION MANAGEMENT     Alvin Ontiveros 68 year old male is on warfarin with supratherapeutic INR result. (Goal INR 2.0-3.0)    Recent labs: (last 7 days)     11/22/23  0822   INR 3.6*       ASSESSMENT     Source(s): Chart Review and Patient/Caregiver Call     Warfarin doses taken: More warfarin taken than planned which may be affecting INR.  Took 5 mg instead of 2.5 mg on 11/17 and 11/19.  Already took his warfarin today.  Diet: No new diet changes identified  Medication/supplement changes: None noted  New illness, injury, or hospitalization: No  Signs or symptoms of bleeding or clotting: No  Previous result: Therapeutic last visit; previously outside of goal range  Additional findings: None       PLAN     Recommended plan for temporary change(s) affecting INR     Dosing Instructions: hold dose then continue your current warfarin dose with next INR in 10 days       Summary  As of 11/22/2023      Full warfarin instructions:  11/23: Hold; Otherwise 2.5 mg every Sun, Fri; 5 mg all other days   Next INR check:  12/1/2023               Telephone call with Parmjit who verbalizes understanding and agrees to plan    Check at provider office visit    Education provided:   Please call back if any changes to your diet, medications or how you've been taking warfarin    Plan made per [facility] anticoagulation protocol    Kari Lizarraga RN  Anticoagulation Clinic  11/22/2023    _______________________________________________________________________     Anticoagulation Episode Summary       Current INR goal:  2.0-3.0   TTR:  83.6% (1 y)   Target end date:  Indefinite   Send INR reminders to:  [facility]    Indications    Long-term (current) use of anticoagulants [Z79.01] [Z79.01]  Heterozygous factor V Leiden mutation (H24) [D68.51]  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

## 2023-12-01 ENCOUNTER — OFFICE VISIT (OUTPATIENT)
Dept: INTERNAL MEDICINE | Facility: CLINIC | Age: 68
End: 2023-12-01
Payer: COMMERCIAL

## 2023-12-01 ENCOUNTER — ANTICOAGULATION THERAPY VISIT (OUTPATIENT)
Dept: ANTICOAGULATION | Facility: CLINIC | Age: 68
End: 2023-12-01

## 2023-12-01 VITALS
RESPIRATION RATE: 20 BRPM | HEART RATE: 88 BPM | DIASTOLIC BLOOD PRESSURE: 80 MMHG | HEIGHT: 72 IN | SYSTOLIC BLOOD PRESSURE: 148 MMHG | BODY MASS INDEX: 30.07 KG/M2 | TEMPERATURE: 98.2 F | WEIGHT: 222 LBS | OXYGEN SATURATION: 99 %

## 2023-12-01 DIAGNOSIS — E78.5 HYPERLIPIDEMIA LDL GOAL <130: ICD-10-CM

## 2023-12-01 DIAGNOSIS — D68.51 HETEROZYGOUS FACTOR V LEIDEN MUTATION (H): ICD-10-CM

## 2023-12-01 DIAGNOSIS — Z86.718 PERSONAL HISTORY OF VENOUS THROMBOSIS AND EMBOLISM: ICD-10-CM

## 2023-12-01 DIAGNOSIS — I26.99 PULMONARY EMBOLISM, UNSPECIFIED CHRONICITY, UNSPECIFIED PULMONARY EMBOLISM TYPE, UNSPECIFIED WHETHER ACUTE COR PULMONALE PRESENT (H): ICD-10-CM

## 2023-12-01 DIAGNOSIS — Z00.00 ENCOUNTER FOR MEDICARE ANNUAL WELLNESS EXAM: Primary | ICD-10-CM

## 2023-12-01 DIAGNOSIS — F10.21 ALCOHOL DEPENDENCE IN REMISSION (H): ICD-10-CM

## 2023-12-01 DIAGNOSIS — E03.4 HYPOTHYROIDISM DUE TO ACQUIRED ATROPHY OF THYROID: ICD-10-CM

## 2023-12-01 DIAGNOSIS — J02.9 SORE THROAT: ICD-10-CM

## 2023-12-01 DIAGNOSIS — D68.52 PROTHROMBIN GENE MUTATION (H): ICD-10-CM

## 2023-12-01 DIAGNOSIS — K21.9 GASTROESOPHAGEAL REFLUX DISEASE WITHOUT ESOPHAGITIS: ICD-10-CM

## 2023-12-01 DIAGNOSIS — N52.01 ERECTILE DYSFUNCTION DUE TO ARTERIAL INSUFFICIENCY: ICD-10-CM

## 2023-12-01 DIAGNOSIS — I10 ESSENTIAL HYPERTENSION: ICD-10-CM

## 2023-12-01 DIAGNOSIS — Z79.01 LONG TERM CURRENT USE OF ANTICOAGULANT THERAPY: ICD-10-CM

## 2023-12-01 DIAGNOSIS — Z79.01 LONG TERM CURRENT USE OF ANTICOAGULANT THERAPY: Primary | ICD-10-CM

## 2023-12-01 DIAGNOSIS — Z20.822 EXPOSURE TO 2019 NOVEL CORONAVIRUS: ICD-10-CM

## 2023-12-01 DIAGNOSIS — R73.03 PREDIABETES: ICD-10-CM

## 2023-12-01 LAB — INR BLD: 2.8 (ref 0.9–1.1)

## 2023-12-01 PROCEDURE — 87635 SARS-COV-2 COVID-19 AMP PRB: CPT | Performed by: INTERNAL MEDICINE

## 2023-12-01 PROCEDURE — 36416 COLLJ CAPILLARY BLOOD SPEC: CPT | Performed by: INTERNAL MEDICINE

## 2023-12-01 PROCEDURE — 85610 PROTHROMBIN TIME: CPT | Performed by: INTERNAL MEDICINE

## 2023-12-01 PROCEDURE — G0439 PPPS, SUBSEQ VISIT: HCPCS | Performed by: INTERNAL MEDICINE

## 2023-12-01 PROCEDURE — 99214 OFFICE O/P EST MOD 30 MIN: CPT | Mod: 25 | Performed by: INTERNAL MEDICINE

## 2023-12-01 RX ORDER — LOSARTAN POTASSIUM 100 MG/1
100 TABLET ORAL DAILY
Qty: 90 TABLET | Refills: 2 | Status: SHIPPED | OUTPATIENT
Start: 2023-12-01 | End: 2024-10-02

## 2023-12-01 RX ORDER — PRAVASTATIN SODIUM 40 MG
TABLET ORAL
Qty: 90 TABLET | Refills: 2 | Status: SHIPPED | OUTPATIENT
Start: 2023-12-01 | End: 2024-09-03

## 2023-12-01 RX ORDER — HYDROCHLOROTHIAZIDE 25 MG/1
25 TABLET ORAL DAILY
Qty: 90 TABLET | Refills: 2 | Status: SHIPPED | OUTPATIENT
Start: 2023-12-01 | End: 2024-10-02

## 2023-12-01 RX ORDER — RESPIRATORY SYNCYTIAL VIRUS VACCINE 120MCG/0.5
0.5 KIT INTRAMUSCULAR ONCE
Qty: 1 EACH | Refills: 0 | Status: CANCELLED | OUTPATIENT
Start: 2023-12-01 | End: 2023-12-01

## 2023-12-01 RX ORDER — LEVOTHYROXINE SODIUM 75 UG/1
TABLET ORAL
Qty: 90 TABLET | Refills: 2 | Status: SHIPPED | OUTPATIENT
Start: 2023-12-01 | End: 2024-09-19

## 2023-12-01 RX ORDER — WARFARIN SODIUM 5 MG/1
TABLET ORAL
Qty: 90 TABLET | Refills: 1 | Status: SHIPPED | OUTPATIENT
Start: 2023-12-01 | End: 2024-04-03

## 2023-12-01 RX ORDER — TADALAFIL 20 MG/1
20 TABLET ORAL DAILY PRN
Qty: 30 TABLET | Status: SHIPPED | OUTPATIENT
Start: 2023-12-01

## 2023-12-01 RX ORDER — FOLIC ACID 1 MG/1
1000 TABLET ORAL DAILY
Qty: 90 TABLET | Refills: 1 | Status: SHIPPED | OUTPATIENT
Start: 2023-12-01 | End: 2024-08-06

## 2023-12-01 SDOH — HEALTH STABILITY: PHYSICAL HEALTH: ON AVERAGE, HOW MANY MINUTES DO YOU ENGAGE IN EXERCISE AT THIS LEVEL?: 40 MIN

## 2023-12-01 SDOH — HEALTH STABILITY: PHYSICAL HEALTH: ON AVERAGE, HOW MANY DAYS PER WEEK DO YOU ENGAGE IN MODERATE TO STRENUOUS EXERCISE (LIKE A BRISK WALK)?: 5 DAYS

## 2023-12-01 ASSESSMENT — ENCOUNTER SYMPTOMS
FEVER: 0
EYE PAIN: 0
NAUSEA: 0
JOINT SWELLING: 0
SHORTNESS OF BREATH: 0
COUGH: 1
ABDOMINAL PAIN: 0
HEARTBURN: 0
HEMATURIA: 0
CHILLS: 0
PARESTHESIAS: 0
HEMATOCHEZIA: 0
PALPITATIONS: 0
DIZZINESS: 1
NERVOUS/ANXIOUS: 0
SORE THROAT: 0
MYALGIAS: 0
WEAKNESS: 0
FREQUENCY: 0
DIARRHEA: 0
ARTHRALGIAS: 0
CONSTIPATION: 0
DYSURIA: 0
HEADACHES: 0

## 2023-12-01 ASSESSMENT — LIFESTYLE VARIABLES
SKIP TO QUESTIONS 9-10: 1
HOW MANY STANDARD DRINKS CONTAINING ALCOHOL DO YOU HAVE ON A TYPICAL DAY: PATIENT DOES NOT DRINK
HOW OFTEN DO YOU HAVE A DRINK CONTAINING ALCOHOL: NEVER
AUDIT-C TOTAL SCORE: 0
HOW OFTEN DO YOU HAVE SIX OR MORE DRINKS ON ONE OCCASION: NEVER

## 2023-12-01 ASSESSMENT — ACTIVITIES OF DAILY LIVING (ADL): CURRENT_FUNCTION: NO ASSISTANCE NEEDED

## 2023-12-01 NOTE — COMMUNITY RESOURCES LIST (ENGLISH)
12/01/2023   [facility] - Outpatient Clinics  N/A  For additional resource needs, please contact your health insurance member services or your primary care team.  Phone: [phone]   Email: N/A   Address: [address] [place] [address]   Hours: N/A        Hotlines and Helplines       Hotline - Housing crisis  1  [facility] (Main Office) Distance: 11.41 miles      Phone/Virtual   [address] [place] [address]  Language: English  Hours: Mon - Sun Open 24 Hours   Phone: (634) 850-5147 Email: info@[facility] Website: http://[facility].Witel     2  [facility] Distance: 14.62 miles      Phone/Virtual   [address] [place] [address]  Language: English  Hours: Mon - Sun Open 24 Hours   Phone: (389) 316-9123 Email: communications@[facility].org Website: https://[facility]-[place].org/oursaviourshousing/          Housing       Coordinated Entry access point  3  [facility] Distance: 9.09 miles      In-Person, Phone/Virtual   [address] [place] [address]  Language: English  Hours: Mon - Fri 8:30 AM - 4:30 PM  Fees: Free   Phone: (716) 483-4434 Email: info@[facility].org Website: https://www.[facility].org/locations/[facility]/     4  [facility] - Housing Services Distance: 14.68 miles      In-Person   [address] [place] [address]  Language: English  Hours: Mon - Fri 9:00 AM - 5:00 PM  Fees: Free   Phone: (854) 971-5902 Email: housing@[facility].org Website: http://www.[facility].org/housing     Drop-in center or day shelter  5  [facility] Distance: 9.49 miles      In-Person   [address] [place] [address]  Language: English  Hours: Mon - Fri 9:00 AM - 4:00 PM  Fees: Free   Phone: (636) 387-8969 Email: mitzik@[employer].org Website: http://listeninghouse.org     6  [facility] and [place] [facility] Distance: 14.92 miles       In-Person   [address] [place] [address]  Language: English, Eritrean, Australian  Hours: Mon - Sat 7:00 AM - 3:00 PM  Fees: Free, Self Pay   Phone: (126) 324-9232 Email: info@[employer] Website: https://www.[employer].Minube/locations/opportunity-center/     Housing search assistance  7  [employer] [employer] - https://BBspace/ Distance: 15.51 miles      Phone/Virtual   [address] [place] [address]  Language: English  Hours: Mon - Sun Open 24 Hours   Email: info@[employer] Website: https://BBspace     8  [employer] - Online housing search assistance Distance: 16.49 miles      Phone/Virtual   [address] [address]  Language: English, Hmong, Eritrean, Australian  Hours: Mon - Sun Open 24 Hours   Phone: (749) 672-7984 Email: info@[employer].org Website: http://www.housinglink.org/     Shelter for families  9  [facility] Distance: 5.11 miles      In-Person   [address] [place] [address]  Language: English  Hours: Mon - Sun Open 24 Hours  Fees: Free, Sliding Fee   Phone: (421) 575-9887 Ext.1 Email: info@[facility].[employer]ube Website: http://www.[facility].org     10  [facility] Distance: 23.07 miles      In-Person   [address] [place] [address]  Language: English  Hours: Mon - Fri 3:00 PM - 9:00 AM , Sat - Sun Open 24 Hours  Fees: Free   Phone: (542) 996-8060 Ext.1 Website: https://www.saintandrews.org/2020/07/03/emergency-family-shelter/     Shelter for individuals  11  [facility]  Swapnil Lopez  [facility] Distance: 7.23 miles      In-Person   [address] [place] [address]  Language: English, Australian  Hours: Mon - Fri 8:00 AM - 4:30 PM  Fees: Free   Phone: (890) 791-5006 Email: info@capagency.org Website: http://www.capagency.org     12  [facility] [place] [facility] [place] [facility] Distance: 9.12 miles      In-Person   [address] [place] [address]  Language: English  Hours: Mon - Sun 5:00 PM - 10:00 AM  Fees: Free, Self Pay   Phone: (960) 299-2825 Email: info@[employer] Website: https://www.[employer].Bango/locations/[facility]/          Important Numbers & Websites       [address]  Poison Control   (729) 729-8750 [place].org  Suicide and Crisis Lifeline   [address] [place], [place] [address]line.org  [facility]help [place] Child Abuse Hotline   478.324.6906 Childhelphotline.org  National Sexual Assault Hotline   (419) 968-8604 (HOPE) [facility] Runaway Safeline   (706) 578-6501 (RUNAWAY) [facility].org  Pregnancy & Postpartum Support [place]   Call/text 516-308-9806 Ppsupportmn.org  Substance Abuse National Helpline ([facility]   543-134-HELP (2254) Findtreatment.gov  Emergency Services   911

## 2023-12-01 NOTE — PROGRESS NOTES
"ANTICOAGULATION MANAGEMENT     Alvin Ontiveros 68 year old male is on warfarin with therapeutic INR result. (Goal INR 2.0-3.0)    Recent labs: (last 7 days)     12/01/23  0934   INR 2.8*       ASSESSMENT     Source(s): Chart Review and Patient/Caregiver Call     Warfarin doses taken: Warfarin taken as instructed--with intentional hold on 11/23/23.  Diet: No new diet changes identified  Medication/supplement changes: None noted  New illness, injury, or hospitalization: Yes: patient reports he did have a sore throat \"a few days ago\", that has resolved but has a \"slight cough\". Covid test is processing, patient's wife was diagnosed with Covid on 11/25/23. Patient reports that he does not feel ill currently.  Signs or symptoms of bleeding or clotting: No  Previous result: Supratherapeutic  Additional findings: wellness exam today-no change in care plan.       PLAN     Recommended plan for temporary change(s) affecting INR     Dosing Instructions: Continue your current warfarin dose with next INR in 3 weeks       Summary  As of 12/1/2023      Full warfarin instructions:  2.5 mg every Sun, Fri; 5 mg all other days   Next INR check:  12/22/2023               Telephone call with Parmjit who verbalizes understanding and agrees to plan and who agrees to plan and repeated back plan correctly    Lab visit scheduled    Education provided:   Contact [phone]  with any changes, questions or concerns.     Plan made per [facility] anticoagulation protocol    Maryam Boggs RN  Anticoagulation Clinic  12/1/2023    _______________________________________________________________________     Anticoagulation Episode Summary       Current INR goal:  2.0-3.0   TTR:  83.2% (1 y)   Target end date:  Indefinite   Send IN[facility]ers [facility]:  [facility]    Indications    Long-term (current) use of anticoagulants [Z79.01] [Z79.01]  Heterozygous factor V Leiden mutation (H24) [D68.51]  Heterozygous Prothrombin Gene Mutation " [D68.52]  Personal history of venous thrombosis and embolism [Z86.718]             Comments:               Anticoagulation Care Providers       Provider Role Specialty Phone number    Du Almaraz MD Referring Internal Medicine [phone]

## 2023-12-01 NOTE — PATIENT INSTRUCTIONS
Patient Education   Personalized Prevention Plan  You are due for the preventive services outlined below.  Your care team is available to assist you in scheduling these services.  If you have already completed any of these items, please share that information with your care team to update in your medical record.  Health Maintenance Due   Topic Date Due    Zoster (Shingles) Vaccine (1 of 2) Never done    RSV VACCINE (Pregnancy & 60+) (1 - 1-dose 60+ series) Never done    AORTIC ANEURYSM SCREENING (SYSTEM ASSIGNED)  Never done    Flu Vaccine (1) 09/01/2023    COVID-19 Vaccine (4 - 2023-24 season) 09/01/2023    ANNUAL REVIEW OF HM ORDERS  11/22/2023            Blood pressure a bit high today on several readings. Check your home blood pressure weekly (and record the date/BP), then see me or contact the clinic in two months with your findings.     Otherwise everything looks fine.      Refills of medication have been faxed to your pharmacy (or provided in written).      Lab results look fine on [facility].     Someone will contact you to help you to schedule a hand surgery appointment and an ENT appointment to check hearing.       See me in a year, sooner if problems.

## 2023-12-01 NOTE — COMMUNITY RESOURCES LIST (ENGLISH)
12/01/2023   [facility]  N/A  For questions about this resource list or additional care needs, please contact your primary care clinic or care manager.  Phone: [phone]   Email: N/A   Address: [facility] [place] [address]   Hours: N/A        Hotlines and Helplines       Hotline - Housing crisis  1  [facility] (Main Office) Distance: 11.41 miles      Phone/Virtual   [address] [place] [address]  Language: English  Hours: Mon - Sun Open 24 Hours   Phone: (385) 772-5894 Email: info@[facility] Website: http://[facility].CANDDi     2  [facility] Distance: 14.62 miles      Phone/Virtual   [address] [place] [address]  Language: English  Hours: Mon - Sun Open 24 Hours   Phone: (947) 394-4814 Email: communications@[facility].org Website: https://[facility]-[place].org/oursaviourshousing/          Housing       Coordinated Entry access point  3  [facility] Distance: 9.09 miles      In-Person, Phone/Virtual   [address] [place] [address]  Language: English  Hours: Mon - Fri 8:30 AM - 4:30 PM  Fees: Free   Phone: (651) 186-6238 Email: info@[facility].org Website: https://www.[facility].org/locations/[facility]/     4  [facility] ([facility] - Housing Services Distance: 14.68 miles      In-Person   [address] [place] [address]  Language: English  Hours: Mon - Fri 9:00 AM - 5:00 PM  Fees: Free   Phone: (550) 886-7682 Email: housing@[facility].org Website: http://www.[facility].org/housing     Drop-in center or day shelter  5  [facility] Distance: 9.49 miles      In-Person   [address] [place] [address]  Language: English  Hours: Mon - Fri 9:00 AM - 4:00 PM  Fees: Free   Phone: (860) 162-7642 Email: brooke@[employer].org Website: http://listeninghouse.org     6  [facility] Distance: 14.92 miles       In-Person   [address] [place] [address]  Language: English, Slovenian, Citizen of [place]  Hours: Mon - Sat 7:00 AM - 3:00 PM  Fees: Free, Self Pay   Phone: (692) 243-3371 Email: info@[employer] Website: https://www.[employer]ok[employer]/locations/opportunity-center/     Housing search assistance  7  [facility] Aging and Disability Services Distance: 4.89 miles      In-Person   [address] [place] [address]  Language: English  Hours: Mon - Fri 8:00 AM - 4:00 PM  Fees: Free, Insurance, Sliding Fee   Phone: (987) 125-8393 Email: jonelle@[facility] Website: https://www.[facility]./HealthFamily/Disabilities     8  [facility] - Online Housing Search Assistance Distance: 8.18 miles      Phone/Virtual [address] [place] [address]  Language: English  Hours: Mon - Sun Open 24 Hours  Fees: Free   Phone: (618) 267-3350 Email: jasiel@[employer] Website: https://Oh[employer].[place]ok[employer]/     Shelter for families  9  [facility] Distance: 5.11 miles      In-Person   [address] [place] [address]  Language: English  Hours: Mon - Sun Open 24 Hours  Fees: Free, Sliding Fee   Phone: (566) 838-7997 Ext.1 Email: info@[facility].[employer] Website: http://www.[facility].[place]okstone     10  [facility] Distance: 23.07 miles      In-Person   [address] [place] [address]  Language: English  Hours: Mon - Fri 3:00 PM - 9:00 AM , Sat - Sun Open 24 Hours  Fees: Free   Phone: (871) 255-1143 Ext.1 Website: https://www.saintandrews.org/2020/07/03/emergency-family-shelter/     Shelter for individuals  11  [facility] Distance: 7.23 miles      In-Person   [address] [place] [address]  Language: English, Citizen of [place]  Hours: Mon - Fri 8:00 AM - 4:30 PM  Fees: Free   Phone: (198) 352-3823 Email: info@[employer].org Website: http://www.capagency.org     12   [facility] [place] [facility] [place] [facility] Distance: 9.12 miles      In-Person   [address] [place] [address]  Language: English  Hours: Mon - Sun 5:00 PM - 10:00 AM  Fees: Free, Self Pay   Phone: (382) 153-5876 Email: info@[employer] Website: https://www.GlobeIn/locations/[facility]/          Important Numbers & Websites       Emergency Services   911  [facility] Services   311  Poison Control   (534) 819-7751  Suicide Prevention Lifeline   (134) 264-9750 (TALK)  Child Abuse Hotline   (226) 807-2744 (4-A-Child)  Sexual Assault Hotline   (938) 574-2608 (HOPE)  National Runaway Safeline   (459) 218-2471 (RUNAWAY)  All-Options Talkline   (629) 288-8629  Substance Abuse Referral   (983) 197-3505 (HELP)

## 2023-12-01 NOTE — PROGRESS NOTES
"SUBJECTIVE:   Parmjit is a 68 year old, presenting for the following:  Medicare Visit        12/1/2023     8:20 AM   Additional Questions   Roomed by Vanessa ROD CMA       Are you in the first 12 months of your Medicare coverage?  No    Healthy Habits:     In general, how would you rate your overall health?  Good    Frequency of exercise:  4-5 days/week    Duration of exercise:  30-45 minutes    Do you usually eat at least 4 servings of fruit and vegetables a day, include whole grains    & fiber and avoid regularly eating high fat or \"junk\" foods?  Yes    Taking medications regularly:  Yes    Medication side effects:  None    Ability to successfully perform activities of daily living:  No assistance needed    Home Safety:  No safety concerns identified    Hearing Impairment:  No hearing concerns    In the past 6 months, have you been bothered by leaking of urine?  No    In general, how would you rate your overall mental or emotional health?  Excellent      Today's PHQ-2 Score:       12/1/2023     8:16 AM   PHQ-2 ( 1999 Pfizer)   Q1: Little interest or pleasure in doing things 0   Q2: Feeling down, depressed or hopeless 0   PHQ-2 Score 0   Q1: Little interest or pleasure in doing things Not at all   Q2: Feeling down, depressed or hopeless Not at all   PHQ-2 Score 0           Have you ever done Advance Care Planning? (For example, a Health Directive, POLST, or a discussion with a medical provider or your loved ones about your wishes): Yes, patient states has an Advance Care Planning document and will bring a copy to the clinic.       Fall risk  Fallen 2 or more times in the past year?: No  Any fall with injury in the past year?: No    Cognitive Screening   1) Repeat 3 items (Leader, Season, Table)    2) Clock draw: NORMAL  3) 3 item recall: Recalls 3 objects  Results: 3 items recalled: COGNITIVE IMPAIRMENT LESS LIKELY    Mini-CogTM Copyright S Sharyn. Licensed by the author for use in [facility]; reprinted with " permission (ekta@[email or web address]). All rights reserved.      Do you have sleep apnea, excessive snoring or daytime drowsiness? : no    Reviewed and updated as needed this visit by clinical staff   Tobacco  Allergies  Meds              Reviewed and updated as needed this visit by Provider                 Social History     Tobacco Use    Smoking status: Former     Packs/day: 1.00     Years: 30.00     Additional pack years: 0.00     Total pack years: 30.00     Types: Cigarettes     Quit date: 2005     Years since quittin.9    Smokeless tobacco: Never   Substance Use Topics    Alcohol use: Yes             2023     8:15 AM   Alcohol Use   Prescreen: >3 drinks/day or >7 drinks/week? No     Do you have a current opioid prescription? No  Do you use any other controlled substances or medications that are not prescribed by a provider? None      Eye exam with ophthalmology on this date: 2023; [facility]; [place], [place]        PROBLEMS TO ADD ON......In addition to an Annual Wellness Exam, we addressed prediabetes, hypertension, hyperlipidemia, GERD, hypothyroidism, left bilateral hand contractures, erectile dysfunction, hearing loss.       The patient is tolerating his current medications without any adverse effects.      No symptoms of thyroid disturbance. Recent TSH normal.   Reflux symptoms well controlled on omeprazole.      BP appears controlled on Losartan and hydrochlorothiazide.     We reviewed recent labs including lipid panel. Will refill pravastatin.   A1c remains in prediabetes range. Reviewed importance of maintaining weight and monitoring carbohydrate portions.   He has previously been prescribed sildenafil for erectile dysfunction but has never taken it.  He is offered a new Rx with directions for use and potential benefits/side effects.         Current providers sharing in care for this patient include:   Patient Care Team:  Du Almaraz MD as PCP - General  Du Almaraz MD as  Assigned PCP  Apryl Soni AuD as Audiologist (Audiology)  Jim Alexander MD as MD (Otolaryngology)  Blaine Curtis MD as Assigned Musculoskeletal Provider  Jim Alexander MD as Assigned Surgical Provider  Oumar Hoffmann MD as Assigned Neuroscience Provider    The following health maintenance items are reviewed in Epic and correct as of today:  Health Maintenance   Topic Date Due    ZOSTER IMMUNIZATION (1 of 2) Never done    RSV VACCINE (Pregnancy & 60+) (1 - 1-dose 60+ series) Never done    AORTIC ANEURYSM SCREENING (SYSTEM ASSIGNED)  Never done    INFLUENZA VACCINE (1) 09/01/2023    COVID-19 Vaccine (4 - 2023-24 season) 09/01/2023    ANNUAL REVIEW OF HM ORDERS  11/22/2023    MEDICARE ANNUAL WELLNESS VISIT  11/22/2023    TSH W/FREE T4 REFLEX  11/22/2024    FALL RISK ASSESSMENT  12/01/2024    ADVANCE CARE PLANNING  12/05/2026    LIPID  11/22/2028    DTAP/TDAP/TD IMMUNIZATION (5 - Td or Tdap) 11/10/2030    COLORECTAL CANCER SCREENING  11/02/2032    HEPATITIS C SCREENING  Completed    PHQ-2 (once per calendar year)  Completed    Pneumococcal Vaccine: 65+ Years  Completed    IPV IMMUNIZATION  Aged Out    HPV IMMUNIZATION  Aged Out    MENINGITIS IMMUNIZATION  Aged Out    RSV MONOCLONAL ANTIBODY  Aged Out       Pneumococcal vaccinations: Received Prevnar-20 on 11/22/2022    Review of Systems   Constitutional:  Negative for chills and fever.   HENT:  Negative for congestion, ear pain, hearing loss and sore throat.    Eyes:  Negative for pain and visual disturbance.   Respiratory:  Positive for cough. Negative for shortness of breath.    Cardiovascular:  Negative for chest pain, palpitations and peripheral edema.   Gastrointestinal:  Negative for abdominal pain, constipation, diarrhea, heartburn, hematochezia and nausea.   Genitourinary:  Negative for dysuria, frequency, genital sores, hematuria, impotence, penile discharge and urgency.   Musculoskeletal:  Negative for arthralgias, joint swelling and  "myalgias.   Skin:  Negative for rash.   Neurological:  Positive for dizziness. Negative for weakness, headaches and paresthesias.   Psychiatric/Behavioral:  Negative for mood changes. The patient is not nervous/anxious.        OBJECTIVE:   BP (!) 148/80 (BP Location: Right arm, Patient Position: Sitting, Cuff Size: Adult Large)   Pulse 88   Temp 98.2  F (36.8  C) (Tympanic)   Resp 20   Ht 1.84 m (6' 0.44\")   Wt 100.7 kg (222 lb)   SpO2 99%   BMI 29.74 kg/m   Estimated body mass index is 29.74 kg/m  as calculated from the following:    Height as of this encounter: 1.84 m (6' 0.44\").    Weight as of this encounter: 100.7 kg (222 lb).    Physical Exam  GENERAL: healthy, alert and no distress  EYES: Eyes grossly normal to inspection, PERRL and conjunctivae and sclerae normal  HENT: ear canals and TM's normal, nose and mouth without ulcers or lesions  NECK: no adenopathy, no asymmetry, masses, or scars and thyroid normal to palpation  RESP: lungs clear to auscultation - no rales, rhonchi or wheezes  CV: regular rate and rhythm, normal S1 S2, no S3 or S4, no murmur, click or rub, no peripheral edema and peripheral pulses strong  ABDOMEN: soft, nontender, no hepatosplenomegaly, no masses and bowel sounds normal  MS: no gross musculoskeletal defects noted, no edema  SKIN: no suspicious lesions or rashes  NEURO: Normal strength and tone, mentation intact and speech normal  PSYCH: mentation appears normal, affect normal/bright    Diagnostic Test Results:  Labs reviewed in Epic    ASSESSMENT / PLAN:   (Z00.00) Encounter for Medicare annual wellness exam  (primary encounter diagnosis)  Comment: Stable health. See epic orders.     (I10) Essential hypertension  Comment: BP elevated. See pt instructions and epic orders.  Plan: hydrochlorothiazide (HYDRODIURIL) 25 MG tablet,        losartan (COZAAR) 100 MG tablet, OFFICE/OUTPT         VISIT,EST,LEVL IV          (N52.01) Erectile dysfunction due to arterial " insufficiency  Comment: Refill Rx.   Plan: tadalafil (CIALIS) 20 MG tablet, OFFICE/OUTPT         VISIT,EST,LEVL IV          (J02.9) Sore throat  Comment: Covid test negative.   Plan: Symptomatic COVID-19 Virus (Coronavirus) by PCR        Nasopharyngeal          (Z20.822) Exposure to 2019 novel coronavirus  Comment: Covid test negative.  Plan: Symptomatic COVID-19 Virus (Coronavirus) by PCR        Nasopharyngeal          (E78.5) Hyperlipidemia LDL goal <130  Comment: Recent LDL at target. Continue current meds.   Plan: pravastatin (PRAVACHOL) 40 MG tablet,         OFFICE/OUTPT VISIT,EST,LEVL IV          (E03.4) Hypothyroidism due to acquired atrophy of thyroid  Comment: Euthyroid clinically and by recent labs.   Plan: levothyroxine (SYNTHROID/LEVOTHROID) 75 MCG         tablet, OFFICE/OUTPT VISIT,EST,LEVL IV          (I26.99) Pulmonary embolism, unspecified chronicity, unspecified pulmonary embolism type, unspecified whether acute cor pulmonale present (H)  Comment: Continue chronic oral anticoagulation.   Plan: OFFICE/OUTPT VISIT,EST,LEVL IV          (D68.52) Prothrombin gene mutation (H24)  (D68.51) Heterozygous Factor V Leiden Mutation  Plan: warfarin ANTICOAGULANT (JANTOVEN ANTICOAGULANT)        5 MG tablet          (R73.03) Prediabetes  Comment: Check glucose and A1c. Discussed monitoring carbohydrate choices/portions.   Plan: folic acid (FOLVITE) 1 MG tablet, OFFICE/OUTPT         VISIT,EST,LEVL IV          (K21.9) Gastroesophageal reflux disease without esophagitis  Comment: Symptoms Well controlled. Continue current meds.   Plan: omeprazole (PRILOSEC) 20 MG DR capsule,         OFFICE/OUTPT VISIT,EST,LEVL IV          (F10.21) Alcohol dependence in remission (H)    (Z79.01) Long term current use of anticoagulant therapy  (Z86.718) Personal history of venous thrombosis and embolism  Comment: Continue chronic oral anticoagulation.       Patient has been advised of split billing requirements and indicates  "understanding: Yes      COUNSELING:  Reviewed preventive health counseling, as reflected in patient instructions      BMI:   Estimated body mass index is 29.74 kg/m  as calculated from the following:    Height as of this encounter: 1.84 m (6' 0.44\").    Weight as of this encounter: 100.7 kg (222 lb).         He reports that he quit smoking about 17 years ago. His smoking use included cigarettes. He has a 30.00 pack-year smoking history. He has never used smokeless tobacco.      Appropriate preventive services were discussed with this patient, including applicable screening as appropriate for fall prevention, nutrition, physical activity, Tobacco-use cessation, weight loss and cognition.  Checklist reviewing preventive services available has been given to the patient.    Reviewed patients plan of care and provided an AVS. The Basic Care Plan (routine screening as documented in Health Maintenance) for Alvin meets the Care Plan requirement. This Care Plan has been established and reviewed with the Patient.          Du Almaraz MD,   [facility]      Patient Instructions     Blood pressure a bit high today on several readings. Check your home blood pressure weekly (and record the date/BP), then see me or contact the clinic in two months with your findings.     Otherwise everything looks fine.      Refills of medication have been faxed to your pharmacy (or provided in written).      Lab results look fine on MyChart.     Someone will contact you to help you to schedule a hand surgery appointment and an ENT appointment to check hearing.       See me in a year, sooner if problems.             Identified Health Risks:  I have reviewed Opioid Use Disorder and Substance Use Disorder risk factors and made any needed referrals.   "

## 2023-12-01 NOTE — Clinical Note
Eye exam with ophthalmology on this date: January 2023; [facility]; [place], [place]

## 2023-12-01 NOTE — COMMUNITY RESOURCES LIST (ENGLISH)
12/01/2023   [facility] - Outpatient Clinics  N/A  For additional resource needs, please contact your health insurance member services or your primary care team.  Phone: [phone]   Email: N/A   Address: [address] [place] [address]   Hours: N/A        Hotlines and Helplines       Hotline - Housing crisis  1  [facility] (Main Office) Distance: 11.41 miles      Phone/Virtual   [address] [place] [address]  Language: English  Hours: Mon - Sun Open 24 Hours   Phone: (760) 903-2244 Email: info@[facility] Website: http://[facility]inder[facility].[employer].     2  [facility] Distance: 14.62 miles      Phone/Virtual   [address] [place] [address]  Language: English  Hours: Mon - Sun Open 24 Hours   Phone: (202) 777-6334 Email: communications@[facility].org Website: https://[facility]-[place].org/oursaviourshousing/          Housing       Coordinated Entry access point  3  [facility] Distance: 9.09 miles      In-Person, Phone/Virtual   [address] [place] [address]  Language: English  Hours: Mon - Fri 8:30 AM - 4:30 PM  Fees: Free   Phone: (524) 685-7429 Email: info@[facility].org Website: https://www.[facility].org/locations/[facility]/     4  [facility] Distance: 14.68 miles      In-Person   [address] [place] [address]  Language: English  Hours: Mon - Fri 9:00 AM - 5:00 PM  Fees: Free   Phone: (371) 127-6881 Email: housing@[facility].org Website: http://www.[facility].org/housing     Drop-in center or day shelter  5  [facility] Distance: 9.49 miles      In-Person   [address] [place] [address]  Language: English  Hours: Mon - Fri 9:00 AM - 4:00 PM  Fees: Free   Phone: (760) 348-8940 Email: mitzik@[employer].org Website: http://listeninghouse.org     6  [facility] Distance: 14.92 miles       In-Person   [address] [place] [address]  Language: English, Cameroonian, Afghan  Hours: Mon - Sat 7:00 AM - 3:00 PM  Fees: Free, Self Pay   Phone: (583) 564-6038 Email: info@[employer] Website: https://www.STORYS.JP.[employer]Crunch/locations/opportunity-center/     Housing search assistance  7  [employer] - https://ClearDATA/ Distance: 15.51 miles      Phone/Virtual   [address] [place] [address]  Language: English  Hours: Mon - Sun Open 24 Hours   Email: info@[facility] Website: https://ClearDATA     8  [employer] - Online housing search assistance Distance: 16.49 miles      Phone/Virtual   [address] [address]  Language: English, Hmong, Cameroonian, Afghan  Hours: Mon - Sun Open 24 Hours   Phone: (394) 787-4839 Email: info@[employer].org Website: http://www.housinglink.org/     Shelter for families  9  [facility] Distance: 5.11 miles      In-Person   [address] [place] [address]  Language: English  Hours: Mon - Sun Open 24 Hours  Fees: Free, Sliding Fee   Phone: (143) 296-9597 Ext.1 Email: info@[facility].COM.[employer] Website: http://www.[facility].COM.org     10  [facility] [place] Distance: 23.07 miles      In-Person   [address] [place] [address]  Language: English  Hours: Mon - Fri 3:00 PM - 9:00 AM , Sat - Sun Open 24 Hours  Fees: Free   Phone: (669) 752-5427 Ext.1 Website: https://www.saintandrews.org/2020/07/03/emergency-family-shelter/     Shelter for individuals  11  [facility] [employer] [facility] ([facility])  Swapnil Lopez  [facility] Distance: 7.23 miles      In-Person   [address] [place] [address]  Language: English, Afghan  Hours: Mon - Fri 8:00 AM - 4:30 PM  Fees: Free   Phone: (413) 944-7252 Email: info@[employer].org Website: http://www.capagency.org     12  [facility] [place]  [facility] [place] [facility] Distance: 9.12 miles      In-Person   [address] [place] [address]  Language: English  Hours: Mon - Sun 5:00 PM - 10:00 AM  Fees: Free, Self Pay   Phone: (421) 169-3668 Email: info@[employer] Website: https://www.[employer]/locations/[facility] [place]/          Important Numbers & Websites       [address]  Poison Control   (114) 560-9398 [place]po[place]  Suicide and Crisis Lifeline   [address] [place], [place] [address]line.org  Childhelp Blue Child Abuse Hotline   359.986.4282 Childhelphotline.org  National Sexual Assault Hotline   (794) 813-8278 (HOPE) [facility] Runaway Safeline   (457) 553-2022 (RUNAWAY) [facility] Hospital[facility]away.org  Pregnancy & Postpartum Support [place]   Call/text 619-337-1845 Ppsupport[place].org  Substance Abuse National Helpline ([facility]   355-835-HELP (3020) Findtreatment.gov  Emergency Services   911

## 2023-12-01 NOTE — PROGRESS NOTES
ANTICOAGULATION MANAGEMENT     Alvin Ontiveros 67 year old male is on warfarin with supratherapeutic INR result. (Goal INR 2.0-3.0)    Recent labs: (last 7 days)     07/10/23  1105   INR 3.1*     ASSESSMENT       Source(s): Chart Review and Patient/Caregiver Call       Warfarin doses taken: More warfarin taken than planned which may be affecting INR    Diet: No new diet changes identified    Medication/supplement changes: flonase and Z-pack were prescribed on 6/23/23. Patient states he has 2 Zithromax pills left to take.    New illness, injury, or hospitalization: Yes: Urgent care visit on 6/23/23 for sinusitis. Patient states he is feeling better but still has a cough.    Signs or symptoms of bleeding or clotting: No    Previous result: Therapeutic last 2(+) visits    Additional findings: Leaving for [place] fishing trip on 7/20/23         PLAN     Recommended plan for temporary change(s) affecting INR     Dosing Instructions: partial hold then continue your current warfarin dose with next INR in 9 days (INR prior to leaving for [place]).      Summary  As of 7/10/2023    Full warfarin instructions:  7/10: 2.5 mg; Otherwise 2.5 mg every Sun, Fri; 5 mg all other days   Next INR check:  7/19/2023             Telephone call with Parmjit who agrees to plan and repeated back plan correctly    Lab visit scheduled    Education provided:     How illness/infection and Z-pack affects INR    Plan made per [facility] anticoagulation protocol    Maryam Boggs, RN  Anticoagulation Clinic  7/10/2023    _______________________________________________________________________     Anticoagulation Episode Summary     Current INR goal:  2.0-3.0   TTR:  78.3 % (1 y)   Target end date:  Indefinite   [facility]    Indications    Long-term (current) use of anticoagulants [Z79.01] [Z79.01]  Heterozygous factor V Leiden mutation (H) [D68.51]  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]  Personal history of  Nurses Note -- 4 Eyes      12/1/2023   6:21 AM      Skin assessed during: Admit      [] No Altered Skin Integrity Present    []Prevention Measures Documented      [x] Yes- Altered Skin Integrity Present or Discovered   [x] LDA Added if Not in Epic (Describe Wound)   [x] New Altered Skin Integrity was Present on Admit and Documented in LDA   [] Wound Image Taken    Wound Care Consulted? Yes    Attending Nurse:  Nissa Vick RN/Staff Member:   Luis Damon RN          venous thrombosis and embolism [Z86.718]           Comments:           Anticoagulation Care Providers     Provider Role Specialty Phone number    Du Almaraz MD Referring Internal Medicine [phone]

## 2023-12-01 NOTE — COMMUNITY RESOURCES LIST (ENGLISH)
12/01/2023   [facility] [place] [facility] - Outpatient Clinics  N/A  For additional resource needs, please contact your health insurance member services or your primary care team.  Phone: [phone]   Email: N/A   Address: [facility] [place] [address]   Hours: N/A        Financial Stability       Rent and mortgage payment assistance  1  [employer] - Emergency Assistance [place] - Rent and mortgage payment assistance Distance: 4.7 miles      In-Person, Phone/Virtual   [address] [place] [address]  Language: English, Kuwaiti  Hours: Mon - Fri 9:00 AM - 4:00 PM  Fees: Free   Phone: (929) 692-3460 Email: info@[employer] Website: http://www.[employer]on     2  [facility] Community Development Agency (A) Distance: 6.13 miles      In-Person   [address] [place] [address]  Language: English  Hours: Mon - Fri 9:00 AM - 4:00 PM  Fees: Free   Phone: (136) 253-5088 Email: info@[employer].[facility].mn. Website: http://www.[place].org/          Important Numbers & Websites       [facility]   [address].org  Poison Control   (800) 138-6907 [place]po[place]  Suicide and Crisis Lifeline   [address] [place], [place] [address]line.org  Childhelp [place] Child Abuse Hotline   152.642.8845 Childhelphotline.org  [place] Sexual Assault Hotline   (125) 899-1911 (HOPE) Rainn.org  National Runaway Safeline   (999) 708-1847 (RUNAWAY) [facility] Healthrunaway.org  Pregnancy & Postpartum Support [place]   Call/text 554-142-2174 Ppsupportmn.org  Substance Abuse National Helpline ([facility]   035-668-HELP (9743) Findtreatment.gov  Emergency Services   911

## 2023-12-02 LAB — SARS-COV-2 RNA RESP QL NAA+PROBE: NEGATIVE

## 2023-12-20 ENCOUNTER — PATIENT OUTREACH (OUTPATIENT)
Dept: GASTROENTEROLOGY | Facility: CLINIC | Age: 68
End: 2023-12-20
Payer: COMMERCIAL

## 2023-12-22 ENCOUNTER — LAB (OUTPATIENT)
Dept: LAB | Facility: CLINIC | Age: 68
End: 2023-12-22
Payer: COMMERCIAL

## 2023-12-22 ENCOUNTER — ANTICOAGULATION THERAPY VISIT (OUTPATIENT)
Dept: ANTICOAGULATION | Facility: CLINIC | Age: 68
End: 2023-12-22

## 2023-12-22 DIAGNOSIS — Z79.01 LONG TERM CURRENT USE OF ANTICOAGULANT THERAPY: ICD-10-CM

## 2023-12-22 DIAGNOSIS — D68.52 PROTHROMBIN GENE MUTATION (H): ICD-10-CM

## 2023-12-22 DIAGNOSIS — Z79.01 LONG TERM CURRENT USE OF ANTICOAGULANT THERAPY: Primary | ICD-10-CM

## 2023-12-22 DIAGNOSIS — D68.51 HETEROZYGOUS FACTOR V LEIDEN MUTATION (H): ICD-10-CM

## 2023-12-22 DIAGNOSIS — Z86.718 PERSONAL HISTORY OF VENOUS THROMBOSIS AND EMBOLISM: ICD-10-CM

## 2023-12-22 LAB — INR BLD: 2.8 (ref 0.9–1.1)

## 2023-12-22 PROCEDURE — 36416 COLLJ CAPILLARY BLOOD SPEC: CPT

## 2023-12-22 PROCEDURE — 85610 PROTHROMBIN TIME: CPT

## 2023-12-22 NOTE — PROGRESS NOTES
ANTICOAGULATION MANAGEMENT     Alvin Ontiveros 68 year old male is on warfarin with therapeutic INR result. (Goal INR 2.0-3.0)    Recent labs: (last 7 days)     12/22/23  0847   INR 2.8*       ASSESSMENT     Warfarin Lab Questionnaire    Warfarin Doses Last 7 Days      12/22/2023     8:38 AM   Dose in Tablet or Mg   TAB or MG? milligram (mg)     Pt Rptd Dose SUNDAY MONDAY TUESDAY WED THURS FRIDAY SATURDAY 12/22/2023   8:38 AM 2.5 5 5 5 5 2.5 5         12/22/2023   Warfarin Lab Questionnaire   Missed doses within past 14 days? No   Changes in diet or alcohol within past 14 days? No   Medication changes since last result? No   Injuries or illness since last result? No   New shortness of breath, severe headaches or sudden changes in vision since last result? No   Abnormal bleeding since last result? No   Upcoming surgery, procedure? No   Best number to call with results? [phone]     Previous result: Therapeutic last visit; previously outside of goal range  Additional findings: Friday's dosing was incorrectly input for 5 mg. Verified with Parmjit that he does indeed take 2.5 mg and edited questionnaire.       PLAN     Recommended plan for no diet, medication or health factor changes affecting INR     Dosing Instructions: Continue your current warfarin dose with next INR in 4 weeks       Summary  As of 12/22/2023      Full warfarin instructions:  2.5 mg every Sun, Fri; 5 mg all other days   Next INR check:  1/19/2024               Telephone call with Parmjit who verbalizes understanding and agrees to plan    Lab visit scheduled    Education provided:   Please call back if any changes to your diet, medications or how you've been taking warfarin    Plan made per [facility] anticoagulation protocol    Elizabeth Oakley RN  Anticoagulation Clinic  12/22/2023    _______________________________________________________________________     Anticoagulation Episode Summary       Current INR goal:  2.0-3.0   TTR:  83.2% (1 y)    Target end date:  Indefinite   Send INR reminders to:  [facility]    Indications    Long-term (current) use of anticoagulants [Z79.01] [Z79.01]  Heterozygous factor V Leiden mutation (H24) [D68.51]  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]  Personal history of venous thrombosis and embolism [Z86.718]             Comments:               Anticoagulation Care Providers       Provider Role Specialty Phone number    Du Almaraz MD Referring Internal Medicine [phone]

## 2024-01-19 ENCOUNTER — LAB (OUTPATIENT)
Dept: LAB | Facility: CLINIC | Age: 69
End: 2024-01-19
Payer: COMMERCIAL

## 2024-01-19 ENCOUNTER — ANTICOAGULATION THERAPY VISIT (OUTPATIENT)
Dept: ANTICOAGULATION | Facility: CLINIC | Age: 69
End: 2024-01-19

## 2024-01-19 DIAGNOSIS — Z79.01 LONG TERM CURRENT USE OF ANTICOAGULANT THERAPY: Primary | ICD-10-CM

## 2024-01-19 DIAGNOSIS — Z86.718 PERSONAL HISTORY OF VENOUS THROMBOSIS AND EMBOLISM: ICD-10-CM

## 2024-01-19 DIAGNOSIS — D68.51 HETEROZYGOUS FACTOR V LEIDEN MUTATION (H): ICD-10-CM

## 2024-01-19 DIAGNOSIS — D68.52 PROTHROMBIN GENE MUTATION (H): ICD-10-CM

## 2024-01-19 DIAGNOSIS — Z79.01 LONG TERM CURRENT USE OF ANTICOAGULANT THERAPY: ICD-10-CM

## 2024-01-19 LAB — INR BLD: 2.5 (ref 0.9–1.1)

## 2024-01-19 PROCEDURE — 85610 PROTHROMBIN TIME: CPT

## 2024-01-19 PROCEDURE — 36416 COLLJ CAPILLARY BLOOD SPEC: CPT

## 2024-01-19 NOTE — PROGRESS NOTES
ANTICOAGULATION MANAGEMENT     Alvin Ontiveros 68 year old male is on warfarin with therapeutic INR result. (Goal INR 2.0-3.0)    Recent labs: (last 7 days)     01/19/24  0839   INR 2.5*       ASSESSMENT     Warfarin Lab Questionnaire    Warfarin Doses Last 7 Days      1/19/2024     8:37 AM   Dose in Tablet or Mg   TAB or MG? milligram (mg)     Pt Rptd Dose SUNDAY MONDAY TUESDAY WED THURS FRIDAY SATURDAY 1/19/2024   8:37 AM 0.5 5 5 5 5 0.5 5   Warfarin dosing verbally confirmed with patient-- taking as instructed 2.5 mg every Sun, Fri; 5 mg all other days Patient entered in number of tablets Sun and Fri and the mg on the rest of the week          1/19/2024   Warfarin Lab Questionnaire   Missed doses within past 14 days? No   Changes in diet or alcohol within past 14 days? No   Medication changes since last result? No   Injuries or illness since last result? No   New shortness of breath, severe headaches or sudden changes in vision since last result? No   Abnormal bleeding since last result? No   Upcoming surgery, procedure? No     Previous result: Therapeutic last 2(+) visits  Additional findings: None       PLAN     Recommended plan for no diet, medication or health factor changes affecting INR     Dosing Instructions: Continue your current warfarin dose with next INR in 5 weeks       Summary  As of 1/19/2024      Full warfarin instructions:  2.5 mg every Sun, Fri; 5 mg all other days   Next INR check:  2/23/2024               Telephone call with Parmjit who verbalizes understanding and agrees to plan    Lab visit scheduled    Education provided:   Please call back if any changes to your diet, medications or how you've been taking warfarin  Contact [phone]  with any changes, questions or concerns.     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

## 2024-02-08 ENCOUNTER — OFFICE VISIT (OUTPATIENT)
Dept: INTERNAL MEDICINE | Facility: CLINIC | Age: 69
End: 2024-02-08
Payer: COMMERCIAL

## 2024-02-08 VITALS
BODY MASS INDEX: 30.88 KG/M2 | RESPIRATION RATE: 18 BRPM | DIASTOLIC BLOOD PRESSURE: 83 MMHG | TEMPERATURE: 97.7 F | HEART RATE: 74 BPM | WEIGHT: 228 LBS | OXYGEN SATURATION: 98 % | SYSTOLIC BLOOD PRESSURE: 154 MMHG | HEIGHT: 72 IN

## 2024-02-08 DIAGNOSIS — D68.52 PROTHROMBIN GENE MUTATION (H): ICD-10-CM

## 2024-02-08 DIAGNOSIS — F10.21 ALCOHOL DEPENDENCE IN REMISSION (H): ICD-10-CM

## 2024-02-08 DIAGNOSIS — I26.99 PULMONARY EMBOLISM, UNSPECIFIED CHRONICITY, UNSPECIFIED PULMONARY EMBOLISM TYPE, UNSPECIFIED WHETHER ACUTE COR PULMONALE PRESENT (H): ICD-10-CM

## 2024-02-08 DIAGNOSIS — I10 ESSENTIAL HYPERTENSION: Primary | ICD-10-CM

## 2024-02-08 PROCEDURE — 99213 OFFICE O/P EST LOW 20 MIN: CPT | Performed by: INTERNAL MEDICINE

## 2024-02-08 RX ORDER — AMLODIPINE BESYLATE 5 MG/1
5 TABLET ORAL DAILY
Qty: 90 TABLET | Refills: 0 | Status: SHIPPED | OUTPATIENT
Start: 2024-02-08 | End: 2024-04-03

## 2024-02-08 RX ORDER — RESPIRATORY SYNCYTIAL VIRUS VACCINE 120MCG/0.5
0.5 KIT INTRAMUSCULAR ONCE
Qty: 1 EACH | Refills: 0 | Status: CANCELLED | OUTPATIENT
Start: 2024-02-08 | End: 2024-02-08

## 2024-02-08 NOTE — PROGRESS NOTES
Assessment & Plan   (I10) Essential hypertension  (primary encounter diagnosis)  Comment: BP elevated. Add amlodipine to current meds. Return in two months.   Plan: amLODIPine (NORVASC) 5 MG tablet          (I26.99) Pulmonary embolism, unspecified chronicity, unspecified pulmonary embolism type, unspecified whether acute cor pulmonale present (H)  (D68.52) Prothrombin gene mutation (H24)  Comment: Continue chronic oral anticoagulation.     (F10.21) Alcohol dependence in remission (H)  Comment: Remains abstinent.       Patient Instructions   Blood pressure remains a bit high on two meds.    Let's add amlodipine 5 mg once daily to current meds.     Keep checking home BP readings.    See me in about two months to reassess.     Jess Parnell is a 68 year old, presenting for the following health issues:  Follow Up      2/8/2024     4:46 PM   Additional Questions   Roomed by Vanessa ROD CMA   Accompanied by Maryam, spouse     History of Present Illness       Hypertension: He presents for follow up of hypertension.  He does check blood pressure  regularly outside of the clinic. Outside blood pressures have been over 140/90. He follows a low salt diet.     He eats 4 or more servings of fruits and vegetables daily.He consumes 0 sweetened beverage(s) daily.He exercises with enough effort to increase his heart rate 30 to 60 minutes per day.  He exercises with enough effort to increase his heart rate 7 days per week.   He is taking medications regularly.     Patient's BP was a bit elevated at his Wellness Exam on 12/1/2023. Home BP's have been generally similarly elevated.     He is open to adding a third BP medication to current regimen.    Past medical, family and social histories as well as medications reviewed and updated as needed.    REVIEW OF SYSTEMS: The following systems have been completely reviewed and are negative except as noted above:   Constitutional, respiratory, cardiovascular, and neurologic systems.       "  Objective    BP (!) 154/83 (BP Location: Left arm, Patient Position: Sitting, Cuff Size: Adult Large)   Pulse 74   Temp 97.7  F (36.5  C) (Oral)   Resp 18   Ht 1.84 m (6' 0.44\")   Wt 103.4 kg (228 lb)   SpO2 98%   BMI 30.55 kg/m    Body mass index is 30.55 kg/m .    Physical Exam   GENERAL: alert and no distress  RESP: lungs clear to auscultation - no rales, rhonchi or wheezes  CV: regular rate and rhythm, normal S1 S2, no S3 or S4, no murmur, click or rub, no peripheral edema          Signed Electronically by: Du Almaraz MD,   "

## 2024-02-08 NOTE — PATIENT INSTRUCTIONS
Blood pressure remains a bit high on two meds.    Let's add amlodipine 5 mg once daily to current meds.     Keep checking home BP readings.    See me in about two months to reassess.

## 2024-02-23 ENCOUNTER — ANTICOAGULATION THERAPY VISIT (OUTPATIENT)
Dept: ANTICOAGULATION | Facility: CLINIC | Age: 69
End: 2024-02-23

## 2024-02-23 ENCOUNTER — LAB (OUTPATIENT)
Dept: LAB | Facility: CLINIC | Age: 69
End: 2024-02-23
Payer: COMMERCIAL

## 2024-02-23 DIAGNOSIS — Z86.718 PERSONAL HISTORY OF VENOUS THROMBOSIS AND EMBOLISM: ICD-10-CM

## 2024-02-23 DIAGNOSIS — D68.51 HETEROZYGOUS FACTOR V LEIDEN MUTATION (H): ICD-10-CM

## 2024-02-23 DIAGNOSIS — Z79.01 LONG TERM CURRENT USE OF ANTICOAGULANT THERAPY: Primary | ICD-10-CM

## 2024-02-23 DIAGNOSIS — D68.52 PROTHROMBIN GENE MUTATION (H): ICD-10-CM

## 2024-02-23 DIAGNOSIS — Z79.01 LONG TERM CURRENT USE OF ANTICOAGULANT THERAPY: ICD-10-CM

## 2024-02-23 LAB — INR BLD: 2.3 (ref 0.9–1.1)

## 2024-02-23 PROCEDURE — 85610 PROTHROMBIN TIME: CPT

## 2024-02-23 PROCEDURE — 36416 COLLJ CAPILLARY BLOOD SPEC: CPT

## 2024-02-23 NOTE — PROGRESS NOTES
ANTICOAGULATION MANAGEMENT     Alvin Ontiveros 68 year old male is on warfarin with therapeutic INR result. (Goal INR 2.0-3.0)    Recent labs: (last 7 days)     02/23/24  0836   INR 2.3*       ASSESSMENT     Source(s): Chart Review and Patient/Caregiver Call     Warfarin doses taken: Warfarin taken as instructed  Diet: No new diet changes identified  Medication/supplement changes: Started amlodipine last week - no interaction with warfarin  New illness, injury, or hospitalization: No - reports he's feeling great  Signs or symptoms of bleeding or clotting: No  Previous result: Therapeutic last 2(+) visits  Additional findings: None       PLAN     Recommended plan for no diet, medication or health factor changes affecting INR     Dosing Instructions: Continue your current warfarin dose with next INR in 6 weeks       Summary  As of 2/23/2024      Full warfarin instructions:  2.5 mg every Sun, Fri; 5 mg all other days   Next INR check:  4/5/2024               Telephone call with Parmjit who verbalizes understanding and agrees to plan    Lab visit scheduled    Education provided:   Please call back if any changes to your diet, medications or how you've been taking warfarin    Plan made per [facility] anticoagulation protocol      Elizabeth Oakley RN  Anticoagulation Clinic  2/23/2024    _______________________________________________________________________     Anticoagulation Episode Summary       Current INR goal:  2.0-3.0   TTR:  83.2% (1 y)   Target end date:  Indefinite   Send INR reminders to:  [facility]    Indications    Long-term (current) use of anticoagulants [Z79.01] [Z79.01]  Heterozygous factor V Leiden mutation (H24) [D68.51]  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

## 2024-03-04 ENCOUNTER — TELEPHONE (OUTPATIENT)
Dept: INTERNAL MEDICINE | Facility: CLINIC | Age: 69
End: 2024-03-04
Payer: COMMERCIAL

## 2024-03-04 NOTE — TELEPHONE ENCOUNTER
Pt calls stating needs refills on all meds. After further discussion should have refills [facility] on all but Amlodipine and Folic acid. Called [facility] to verify they have the refills and told Pt to call for refills.     Bettina Oakley RN [facility]

## 2024-03-27 ENCOUNTER — TELEPHONE (OUTPATIENT)
Dept: INTERNAL MEDICINE | Facility: CLINIC | Age: 69
End: 2024-03-27
Payer: COMMERCIAL

## 2024-03-27 NOTE — TELEPHONE ENCOUNTER
Patient Quality Outreach    Patient is due for the following:   Hypertension -  Hypertension follow-up visit    Next Steps:   Schedule a office visit for blood pressure follow up.    Type of outreach:    Sent letter.      Questions for provider review:               Vanessa Temple CMA

## 2024-03-27 NOTE — LETTER
March 27, 2024      Alvin Ontiveros  [address] [place] [address]        Dear Alvin,       March 27, 2024      Alvin Ontiveros  [address] [place] [facility] [address]    Your team at [facility] cares about your health. We have reviewed your chart and based on our findings; we are making the following recommendations to better manage your health.     You are in particular need of attention regarding the following:     HYPERTENSION FOLLOW UP: Office Visit    If you have already completed these items, please contact the clinic via phone or   MyChart so your care team can review and update your records. Thank you for   choosing [facility] for your healthcare needs. For any questions,   concerns, or to schedule an appointment please contact our clinic.    Healthy Regards,      Your [facility] Care Team        Sincerely,        Du Almaraz MD, MD

## 2024-04-03 DIAGNOSIS — I10 ESSENTIAL HYPERTENSION: ICD-10-CM

## 2024-04-03 DIAGNOSIS — Z86.718 PERSONAL HISTORY OF VENOUS THROMBOSIS AND EMBOLISM: Primary | ICD-10-CM

## 2024-04-03 DIAGNOSIS — K21.9 GASTROESOPHAGEAL REFLUX DISEASE WITHOUT ESOPHAGITIS: ICD-10-CM

## 2024-04-03 DIAGNOSIS — E03.4 HYPOTHYROIDISM DUE TO ACQUIRED ATROPHY OF THYROID: ICD-10-CM

## 2024-04-03 DIAGNOSIS — E78.5 HYPERLIPIDEMIA LDL GOAL <130: ICD-10-CM

## 2024-04-03 DIAGNOSIS — D68.51 HETEROZYGOUS FACTOR V LEIDEN MUTATION (H): ICD-10-CM

## 2024-04-03 RX ORDER — LOSARTAN POTASSIUM 100 MG/1
100 TABLET ORAL DAILY
Qty: 90 TABLET | Refills: 2 | Status: CANCELLED | OUTPATIENT
Start: 2024-04-03

## 2024-04-03 RX ORDER — WARFARIN SODIUM 5 MG/1
TABLET ORAL
Qty: 90 TABLET | Refills: 1 | Status: SHIPPED | OUTPATIENT
Start: 2024-04-03

## 2024-04-03 RX ORDER — HYDROCHLOROTHIAZIDE 25 MG/1
25 TABLET ORAL DAILY
Qty: 90 TABLET | Refills: 2 | Status: CANCELLED | OUTPATIENT
Start: 2024-04-03

## 2024-04-03 RX ORDER — LEVOTHYROXINE SODIUM 75 UG/1
TABLET ORAL
Qty: 90 TABLET | Refills: 2 | Status: CANCELLED | OUTPATIENT
Start: 2024-04-03

## 2024-04-03 RX ORDER — PRAVASTATIN SODIUM 40 MG
TABLET ORAL
Qty: 90 TABLET | Refills: 2 | Status: CANCELLED | OUTPATIENT
Start: 2024-04-03

## 2024-04-03 NOTE — TELEPHONE ENCOUNTER
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 Rx instructions and quantity match patient's current dosing plan. Warfarin 90 day supply with 1 refill granted per [facility] protocol     Maryam Boggs RN  Anticoagulation Clinic

## 2024-04-03 NOTE — TELEPHONE ENCOUNTER
-Amlodipine: Script sent to the pharmacy on 2/4/24 for 90 tablets. Will keep pended medication for a refill.   -Hydrochlorothiazide, Levothyroxine, Losartan, Omeprazole, and Pravastatin: New scripts for these medications were sent to the pharmacy on 12/1/23 for 90 tablets with 2 additional refills on manuela. Patient should have refills on file for all of these medications.   -Warfarin pended and will route to the anticoagulation team for review.     Taya Brower RN

## 2024-04-03 NOTE — TELEPHONE ENCOUNTER
Patient calls asking for refills. Patient stated he called his pharmacy today and was told there were no refills.

## 2024-04-05 ENCOUNTER — ANTICOAGULATION THERAPY VISIT (OUTPATIENT)
Dept: ANTICOAGULATION | Facility: CLINIC | Age: 69
End: 2024-04-05

## 2024-04-05 ENCOUNTER — LAB (OUTPATIENT)
Dept: LAB | Facility: CLINIC | Age: 69
End: 2024-04-05
Payer: COMMERCIAL

## 2024-04-05 DIAGNOSIS — D68.52 PROTHROMBIN GENE MUTATION (H): ICD-10-CM

## 2024-04-05 DIAGNOSIS — Z79.01 LONG TERM CURRENT USE OF ANTICOAGULANT THERAPY: ICD-10-CM

## 2024-04-05 DIAGNOSIS — Z79.01 LONG TERM CURRENT USE OF ANTICOAGULANT THERAPY: Primary | ICD-10-CM

## 2024-04-05 DIAGNOSIS — D68.51 HETEROZYGOUS FACTOR V LEIDEN MUTATION (H): ICD-10-CM

## 2024-04-05 DIAGNOSIS — Z86.718 PERSONAL HISTORY OF VENOUS THROMBOSIS AND EMBOLISM: ICD-10-CM

## 2024-04-05 LAB — INR BLD: 2.6 (ref 0.9–1.1)

## 2024-04-05 PROCEDURE — 36416 COLLJ CAPILLARY BLOOD SPEC: CPT

## 2024-04-05 PROCEDURE — 85610 PROTHROMBIN TIME: CPT

## 2024-04-05 NOTE — PROGRESS NOTES
ANTICOAGULATION MANAGEMENT     Alvin Ontiveros 68 year old male is on warfarin with therapeutic INR result. (Goal INR 2.0-3.0)    Recent labs: (last 7 days)     04/05/24  0810   INR 2.6*       ASSESSMENT     Source(s): Chart Review  Previous INR was Therapeutic last 2(+) visits  Medication, diet, health changes since last INR chart reviewed; none identified         PLAN     Recommended plan for no diet, medication or health factor changes affecting INR     Dosing Instructions: Continue your current warfarin dose with next INR in 6 weeks       Summary  As of 4/5/2024      Full warfarin instructions:  2.5 mg every Sun, Fri; 5 mg all other days   Next INR check:  5/17/2024               Detailed voice message left for Parmjit with dosing instructions and follow up date.     Contact [phone]  to schedule and with any changes, questions or concerns.     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

## 2024-04-09 ENCOUNTER — PATIENT OUTREACH (OUTPATIENT)
Dept: INTERNAL MEDICINE | Facility: CLINIC | Age: 69
End: 2024-04-09
Payer: COMMERCIAL

## 2024-04-09 NOTE — TELEPHONE ENCOUNTER
Patient Quality Outreach    Patient is due for the following:   Hypertension -  Hypertension follow-up visit      Topic Date Due    Zoster (Shingles) Vaccine (1 of 2) Never done    Flu Vaccine (1) 09/01/2023    COVID-19 Vaccine (4 - 2023-24 season) 09/01/2023       Next Steps:   Patient has upcoming appointment, these items will be addressed at that time.    Type of outreach:    Chart review performed, no outreach needed.      Questions for provider review:               Prince Alston MA

## 2024-04-13 NOTE — TELEPHONE ENCOUNTER
RN: Please call patient.     Please check with patient to see if he has been able to check any home BP readings. If so, need date and BP reading.   Need to document whether current regimen effective.

## 2024-04-16 NOTE — TELEPHONE ENCOUNTER
Advised patient of provider recommendation via telephone. Patient reports he has not checked since he's been placed on the amlodipine, but he will from now on and call in BP readings. Routing back to provider for review, patient will be out of medication in early may.     Shahnaz ULRICH

## 2024-04-27 RX ORDER — AMLODIPINE BESYLATE 5 MG/1
5 TABLET ORAL DAILY
Qty: 90 TABLET | Refills: 0 | Status: SHIPPED | OUTPATIENT
Start: 2024-04-27 | End: 2024-08-06

## 2024-05-17 ENCOUNTER — ANTICOAGULATION THERAPY VISIT (OUTPATIENT)
Dept: ANTICOAGULATION | Facility: CLINIC | Age: 69
End: 2024-05-17

## 2024-05-17 ENCOUNTER — LAB (OUTPATIENT)
Dept: LAB | Facility: CLINIC | Age: 69
End: 2024-05-17
Payer: COMMERCIAL

## 2024-05-17 DIAGNOSIS — Z86.718 PERSONAL HISTORY OF VENOUS THROMBOSIS AND EMBOLISM: ICD-10-CM

## 2024-05-17 DIAGNOSIS — Z79.01 LONG TERM CURRENT USE OF ANTICOAGULANT THERAPY: ICD-10-CM

## 2024-05-17 DIAGNOSIS — D68.51 HETEROZYGOUS FACTOR V LEIDEN MUTATION (H): ICD-10-CM

## 2024-05-17 DIAGNOSIS — Z79.01 LONG TERM CURRENT USE OF ANTICOAGULANT THERAPY: Primary | ICD-10-CM

## 2024-05-17 DIAGNOSIS — D68.52 PROTHROMBIN GENE MUTATION (H): ICD-10-CM

## 2024-05-17 LAB — INR BLD: 3 (ref 0.9–1.1)

## 2024-05-17 PROCEDURE — 85610 PROTHROMBIN TIME: CPT

## 2024-05-17 PROCEDURE — 36416 COLLJ CAPILLARY BLOOD SPEC: CPT

## 2024-05-17 NOTE — PROGRESS NOTES
ANTICOAGULATION MANAGEMENT     Alvin Ontiveros 68 year old male is on warfarin with therapeutic INR result. (Goal INR 2.0-3.0)    Recent labs: (last 7 days)     05/17/24  0827   INR 3.0*       ASSESSMENT     Source(s): Chart Review and Patient/Caregiver Call     Warfarin doses taken: Warfarin taken as instructed  Diet: No new diet changes identified  Medication/supplement changes: None noted  New illness, injury, or hospitalization: No  Signs or symptoms of bleeding or clotting: No  Previous result: Therapeutic last 2(+) visits  Additional findings: None       PLAN     Recommended plan for no diet, medication or health factor changes affecting INR     Dosing Instructions: Continue your current warfarin dose with next INR in 6 weeks, prefers to return in 5 weeks per his schedule    Summary  As of 5/17/2024      Full warfarin instructions:  2.5 mg every Sun, Fri; 5 mg all other days   Next INR check:  6/21/2024               Telephone call with Parmjit who verbalizes understanding and agrees to plan    Lab visit scheduled    Education provided:   Please call back if any changes to your diet, medications or how you've been taking warfarin    Plan made per [facility] anticoagulation protocol    Elizabeth Oakley RN  Anticoagulation Clinic  5/17/2024    _______________________________________________________________________     Anticoagulation Episode Summary       Current INR goal:  2.0-3.0   TTR:  89.6% (1 y)   Target end date:  Indefinite   Send INR reminders to:  [facility]    Indications    Long-term (current) use of anticoagulants [Z79.01] [Z79.01]  Heterozygous factor V Leiden mutation (H24) [D68.51]  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

## 2024-06-03 ENCOUNTER — PATIENT OUTREACH (OUTPATIENT)
Dept: GASTROENTEROLOGY | Facility: CLINIC | Age: 69
End: 2024-06-03
Payer: COMMERCIAL

## 2024-06-21 ENCOUNTER — ANTICOAGULATION THERAPY VISIT (OUTPATIENT)
Dept: ANTICOAGULATION | Facility: CLINIC | Age: 69
End: 2024-06-21

## 2024-06-21 ENCOUNTER — LAB (OUTPATIENT)
Dept: LAB | Facility: CLINIC | Age: 69
End: 2024-06-21
Payer: COMMERCIAL

## 2024-06-21 DIAGNOSIS — D68.52 PROTHROMBIN GENE MUTATION (H): ICD-10-CM

## 2024-06-21 DIAGNOSIS — D68.51 HETEROZYGOUS FACTOR V LEIDEN MUTATION (H): ICD-10-CM

## 2024-06-21 DIAGNOSIS — Z79.01 LONG TERM CURRENT USE OF ANTICOAGULANT THERAPY: Primary | ICD-10-CM

## 2024-06-21 DIAGNOSIS — Z79.01 LONG TERM CURRENT USE OF ANTICOAGULANT THERAPY: ICD-10-CM

## 2024-06-21 DIAGNOSIS — Z86.718 PERSONAL HISTORY OF VENOUS THROMBOSIS AND EMBOLISM: ICD-10-CM

## 2024-06-21 LAB — INR BLD: 3 (ref 0.9–1.1)

## 2024-06-21 PROCEDURE — 36416 COLLJ CAPILLARY BLOOD SPEC: CPT

## 2024-06-21 PROCEDURE — 85610 PROTHROMBIN TIME: CPT

## 2024-06-21 NOTE — PROGRESS NOTES
ANTICOAGULATION MANAGEMENT     Alvin Ontiveros 68 year old male is on warfarin with therapeutic INR result. (Goal INR 2.0-3.0)    Recent labs: (last 7 days)     06/21/24  0807   INR 3.0*       ASSESSMENT     Source(s): Chart Review and Patient/Caregiver Call     Warfarin doses taken: Warfarin taken as instructed  Diet: No new diet changes identified  Medication/supplement changes: None noted  New illness, injury, or hospitalization: No  Signs or symptoms of bleeding or clotting: No  Previous result: Therapeutic last 2(+) visits  Additional findings: None       PLAN     Recommended plan for no diet, medication or health factor changes affecting INR     Dosing Instructions: Continue your current warfarin dose with next INR in 6 weeks       Summary  As of 6/21/2024      Full warfarin instructions:  2.5 mg every Sun, Fri; 5 mg all other days   Next INR check:  8/2/2024               Telephone call with Parmjit who agrees to plan and repeated back plan correctly    Lab visit scheduled    Education provided:   Please call back if any changes to your diet, medications or how you've been taking warfarin    Plan made per [facility] anticoagulation protocol    Kari Lizarraga RN  Anticoagulation Clinic  6/21/2024    _______________________________________________________________________     Anticoagulation Episode Summary       Current INR goal:  2.0-3.0   TTR:  91.5% (1 y)   Target end date:  Indefinite   Send INR reminders to:  [facility]    Indications    Long-term (current) use of anticoagulants [Z79.01] [Z79.01]  Heterozygous factor V Leiden mutation (H24) [D68.51]  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

## 2024-07-03 ENCOUNTER — PATIENT OUTREACH (OUTPATIENT)
Dept: GASTROENTEROLOGY | Facility: CLINIC | Age: 69
End: 2024-07-03
Payer: COMMERCIAL

## 2024-07-11 ENCOUNTER — DOCUMENTATION ONLY (OUTPATIENT)
Dept: ANTICOAGULATION | Facility: CLINIC | Age: 69
End: 2024-07-11
Payer: COMMERCIAL

## 2024-07-11 DIAGNOSIS — Z79.01 LONG TERM CURRENT USE OF ANTICOAGULANT THERAPY: ICD-10-CM

## 2024-07-11 DIAGNOSIS — D68.52 PROTHROMBIN GENE MUTATION (H): ICD-10-CM

## 2024-07-11 DIAGNOSIS — D68.51 HETEROZYGOUS FACTOR V LEIDEN MUTATION (H): Primary | ICD-10-CM

## 2024-07-11 DIAGNOSIS — I26.99 PULMONARY EMBOLISM, UNSPECIFIED CHRONICITY, UNSPECIFIED PULMONARY EMBOLISM TYPE, UNSPECIFIED WHETHER ACUTE COR PULMONALE PRESENT (H): ICD-10-CM

## 2024-07-11 NOTE — PROGRESS NOTES
ANTICOAGULATION CLINIC REFERRAL RENEWAL REQUEST       An annual renewal order is required for all patients referred to [facility]ticoagulation [facility].?  Please review and sign the pended referral order for Alvin Ontiveros.       ANTICOAGULATION SUMMARY      Warfarin indication(s)   PE, Heterozygous Factor V Leiden, and Prothrombin Gene Mutation    Mechanical heart valve present?  NO       Current goal range   INR: 2.0-3.0     Goal appropriate for indication? Goal INR 2-3, standard for indication(s) above     Time in Therapeutic Range (TTR)  (Goal > 60%) 91.5%       Office visit with referring provider's group within last year yes on 2/8/24       Kari Lizarraga RN  [facility] Anticoagulation [facility]

## 2024-08-02 ENCOUNTER — LAB (OUTPATIENT)
Dept: LAB | Facility: CLINIC | Age: 69
End: 2024-08-02
Payer: COMMERCIAL

## 2024-08-02 ENCOUNTER — ANTICOAGULATION THERAPY VISIT (OUTPATIENT)
Dept: ANTICOAGULATION | Facility: CLINIC | Age: 69
End: 2024-08-02

## 2024-08-02 DIAGNOSIS — Z79.01 LONG TERM CURRENT USE OF ANTICOAGULANT THERAPY: Primary | ICD-10-CM

## 2024-08-02 DIAGNOSIS — D68.52 PROTHROMBIN GENE MUTATION (H): ICD-10-CM

## 2024-08-02 DIAGNOSIS — D68.51 HETEROZYGOUS FACTOR V LEIDEN MUTATION (H): ICD-10-CM

## 2024-08-02 DIAGNOSIS — Z86.718 PERSONAL HISTORY OF VENOUS THROMBOSIS AND EMBOLISM: ICD-10-CM

## 2024-08-02 DIAGNOSIS — I26.99 PULMONARY EMBOLISM, UNSPECIFIED CHRONICITY, UNSPECIFIED PULMONARY EMBOLISM TYPE, UNSPECIFIED WHETHER ACUTE COR PULMONALE PRESENT (H): ICD-10-CM

## 2024-08-02 DIAGNOSIS — Z79.01 LONG TERM CURRENT USE OF ANTICOAGULANT THERAPY: ICD-10-CM

## 2024-08-02 LAB — INR BLD: 2.7 (ref 0.9–1.1)

## 2024-08-02 PROCEDURE — 85610 PROTHROMBIN TIME: CPT

## 2024-08-02 PROCEDURE — 36416 COLLJ CAPILLARY BLOOD SPEC: CPT

## 2024-08-02 NOTE — PROGRESS NOTES
ANTICOAGULATION MANAGEMENT     Alvin Ontiveros 68 year old male is on warfarin with therapeutic INR result. (Goal INR 2.0-3.0)    Recent labs: (last 7 days)     08/02/24  0837   INR 2.7*       ASSESSMENT     Source(s): Chart Review and Patient/Caregiver Call     Warfarin doses taken: Warfarin taken as instructed  Diet: No new diet changes identified  Medication/supplement changes: None noted  New illness, injury, or hospitalization: No  Signs or symptoms of bleeding or clotting: No  Previous result: Therapeutic last 2(+) visits  Additional findings: None       PLAN     Recommended plan for no diet, medication or health factor changes affecting INR     Dosing Instructions: Continue your current warfarin dose with next INR in 6 weeks       Summary  As of 8/2/2024      Full warfarin instructions:  2.5 mg every Sun, Fri; 5 mg all other days   Next INR check:  9/13/2024               Telephone call with Parmjit who verbalizes understanding and agrees to plan    Lab visit scheduled    Education provided: Contact [phone] with any changes, questions or concerns.     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

## 2024-08-05 DIAGNOSIS — I10 ESSENTIAL HYPERTENSION: ICD-10-CM

## 2024-08-05 DIAGNOSIS — R73.03 PREDIABETES: ICD-10-CM

## 2024-08-06 RX ORDER — AMLODIPINE BESYLATE 5 MG/1
5 TABLET ORAL DAILY
Qty: 90 TABLET | Refills: 0 | Status: SHIPPED | OUTPATIENT
Start: 2024-08-06

## 2024-08-06 RX ORDER — FOLIC ACID 1 MG/1
1000 TABLET ORAL DAILY
Qty: 90 TABLET | Refills: 0 | Status: SHIPPED | OUTPATIENT
Start: 2024-08-06

## 2024-09-01 DIAGNOSIS — E78.5 HYPERLIPIDEMIA LDL GOAL <130: ICD-10-CM

## 2024-09-03 RX ORDER — PRAVASTATIN SODIUM 40 MG
TABLET ORAL
Qty: 90 TABLET | Refills: 0 | Status: SHIPPED | OUTPATIENT
Start: 2024-09-03

## 2024-09-13 ENCOUNTER — ANTICOAGULATION THERAPY VISIT (OUTPATIENT)
Dept: ANTICOAGULATION | Facility: CLINIC | Age: 69
End: 2024-09-13

## 2024-09-13 ENCOUNTER — LAB (OUTPATIENT)
Dept: LAB | Facility: CLINIC | Age: 69
End: 2024-09-13
Payer: COMMERCIAL

## 2024-09-13 DIAGNOSIS — I26.99 PULMONARY EMBOLISM, UNSPECIFIED CHRONICITY, UNSPECIFIED PULMONARY EMBOLISM TYPE, UNSPECIFIED WHETHER ACUTE COR PULMONALE PRESENT (H): ICD-10-CM

## 2024-09-13 DIAGNOSIS — Z79.01 LONG TERM CURRENT USE OF ANTICOAGULANT THERAPY: Primary | ICD-10-CM

## 2024-09-13 DIAGNOSIS — D68.52 PROTHROMBIN GENE MUTATION (H): ICD-10-CM

## 2024-09-13 DIAGNOSIS — D68.51 HETEROZYGOUS FACTOR V LEIDEN MUTATION (H): ICD-10-CM

## 2024-09-13 DIAGNOSIS — Z86.718 PERSONAL HISTORY OF VENOUS THROMBOSIS AND EMBOLISM: ICD-10-CM

## 2024-09-13 DIAGNOSIS — Z79.01 LONG TERM CURRENT USE OF ANTICOAGULANT THERAPY: ICD-10-CM

## 2024-09-13 LAB — INR BLD: 3.2 (ref 0.9–1.1)

## 2024-09-13 PROCEDURE — 36416 COLLJ CAPILLARY BLOOD SPEC: CPT

## 2024-09-13 PROCEDURE — 85610 PROTHROMBIN TIME: CPT

## 2024-09-13 NOTE — PROGRESS NOTES
ANTICOAGULATION MANAGEMENT     Alvin Ontiveros 69 year old male is on warfarin with supratherapeutic INR result. (Goal INR 2.0-3.0)    Recent labs: (last 7 days)     09/13/24  0828   INR 3.2*       ASSESSMENT     Source(s): Chart Review and Patient/Caregiver Call     Warfarin doses taken: More warfarin taken than planned which may be affecting INR - took 5 mg on 9/8  Diet: No new diet changes identified  Medication/supplement changes: None noted  New illness, injury, or hospitalization: No  Signs or symptoms of bleeding or clotting: No  Previous result: Therapeutic last 2(+) visits  Additional findings: Won't have Parmjit hold warfarin today as it's already his half tablet day and he has another on Sunday.        PLAN     Recommended plan for temporary change(s) affecting INR     Dosing Instructions: Continue your current warfarin dose with next INR in 2 weeks, declined and elected for 3 weeks    Summary  As of 9/13/2024      Full warfarin instructions:  2.5 mg every Sun, Fri; 5 mg all other days   Next INR check:  10/4/2024               Telephone call with Parmjit who verbalizes understanding and agrees to plan    Lab visit scheduled    Education provided: Please call back if any changes to your diet, medications or how you've been taking warfarin    Plan made per [facility] anticoagulation protocol    Elizabeth Oakley RN  9/13/2024  Anticoagulation Clinic  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

## 2024-09-19 DIAGNOSIS — E03.4 HYPOTHYROIDISM DUE TO ACQUIRED ATROPHY OF THYROID: ICD-10-CM

## 2024-09-19 RX ORDER — LEVOTHYROXINE SODIUM 75 UG/1
TABLET ORAL
Qty: 90 TABLET | Refills: 0 | Status: SHIPPED | OUTPATIENT
Start: 2024-09-19

## 2024-10-02 DIAGNOSIS — K21.9 GASTROESOPHAGEAL REFLUX DISEASE WITHOUT ESOPHAGITIS: ICD-10-CM

## 2024-10-02 DIAGNOSIS — I10 ESSENTIAL HYPERTENSION: ICD-10-CM

## 2024-10-02 RX ORDER — HYDROCHLOROTHIAZIDE 25 MG/1
25 TABLET ORAL DAILY
Qty: 90 TABLET | Refills: 0 | Status: SHIPPED | OUTPATIENT
Start: 2024-10-02

## 2024-10-02 RX ORDER — LOSARTAN POTASSIUM 100 MG/1
100 TABLET ORAL DAILY
Qty: 90 TABLET | Refills: 0 | Status: SHIPPED | OUTPATIENT
Start: 2024-10-02

## 2024-10-04 ENCOUNTER — LAB (OUTPATIENT)
Dept: LAB | Facility: CLINIC | Age: 69
End: 2024-10-04
Payer: COMMERCIAL

## 2024-10-04 ENCOUNTER — ANTICOAGULATION THERAPY VISIT (OUTPATIENT)
Dept: ANTICOAGULATION | Facility: CLINIC | Age: 69
End: 2024-10-04

## 2024-10-04 DIAGNOSIS — D68.52 PROTHROMBIN GENE MUTATION (H): ICD-10-CM

## 2024-10-04 DIAGNOSIS — I26.99 PULMONARY EMBOLISM, UNSPECIFIED CHRONICITY, UNSPECIFIED PULMONARY EMBOLISM TYPE, UNSPECIFIED WHETHER ACUTE COR PULMONALE PRESENT (H): ICD-10-CM

## 2024-10-04 DIAGNOSIS — D68.51 HETEROZYGOUS FACTOR V LEIDEN MUTATION (H): ICD-10-CM

## 2024-10-04 DIAGNOSIS — Z79.01 LONG TERM CURRENT USE OF ANTICOAGULANT THERAPY: ICD-10-CM

## 2024-10-04 LAB — INR BLD: 3.2 (ref 0.9–1.1)

## 2024-10-04 PROCEDURE — 85610 PROTHROMBIN TIME: CPT

## 2024-10-04 PROCEDURE — 36415 COLL VENOUS BLD VENIPUNCTURE: CPT

## 2024-10-04 NOTE — PROGRESS NOTES
ANTICOAGULATION MANAGEMENT     Alvin Ontiveros 69 year old male is on warfarin with supratherapeutic INR result. (Goal INR 2.0-3.0)    Recent labs: (last 7 days)     10/04/24  0839   INR 3.2*       ASSESSMENT     Source(s): Chart Review and Patient/Caregiver Call     Warfarin doses taken: Warfarin taken as instructed  Diet: No new diet changes identified  Medication/supplement changes: None noted  New illness, injury, or hospitalization: No  Signs or symptoms of bleeding or clotting: No  Previous result: Supratherapeutic  Additional findings: None       PLAN       Dosing Instructions: decrease your warfarin dose (8.3% change) with next INR in 2 weeks       Summary  As of 10/4/2024      Full warfarin instructions:  2.5 mg every Mon, Wed, Fri; 5 mg all other days   Next INR check:  10/18/2024               Telephone call with Parmjit who agrees to plan and repeated back plan correctly    Lab visit scheduled    Education provided: Contact [phone] with any changes, questions or concerns.     Plan made per [facility] anticoagulation protocol    Ana Maria Owusu RN  10/4/2024  Anticoagulation Clinic  [employer] for routing messages: hamzah ZHAO [facility] patient phone line: [phone]        _______________________________________________________________________     Anticoagulation Episode Summary       Current INR goal:  2.0-3.0   TTR:  82.3% (1 y)   Target end date:  Indefinite   [facility]    Indications    Long-term (current) use of anticoagulants [Z79.01] [Z79.01]  Heterozygous factor V Leiden mutation (H) [D68.51]  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

## 2024-10-18 ENCOUNTER — LAB (OUTPATIENT)
Dept: LAB | Facility: CLINIC | Age: 69
End: 2024-10-18
Payer: COMMERCIAL

## 2024-10-18 ENCOUNTER — ANTICOAGULATION THERAPY VISIT (OUTPATIENT)
Dept: ANTICOAGULATION | Facility: CLINIC | Age: 69
End: 2024-10-18

## 2024-10-18 DIAGNOSIS — I26.99 PULMONARY EMBOLISM, UNSPECIFIED CHRONICITY, UNSPECIFIED PULMONARY EMBOLISM TYPE, UNSPECIFIED WHETHER ACUTE COR PULMONALE PRESENT (H): ICD-10-CM

## 2024-10-18 DIAGNOSIS — Z86.718 PERSONAL HISTORY OF VENOUS THROMBOSIS AND EMBOLISM: ICD-10-CM

## 2024-10-18 DIAGNOSIS — D68.51 HETEROZYGOUS FACTOR V LEIDEN MUTATION (H): ICD-10-CM

## 2024-10-18 DIAGNOSIS — D68.52 PROTHROMBIN GENE MUTATION (H): ICD-10-CM

## 2024-10-18 DIAGNOSIS — Z79.01 LONG TERM CURRENT USE OF ANTICOAGULANT THERAPY: Primary | ICD-10-CM

## 2024-10-18 DIAGNOSIS — Z79.01 LONG TERM CURRENT USE OF ANTICOAGULANT THERAPY: ICD-10-CM

## 2024-10-18 LAB — INR BLD: 2.5 (ref 0.9–1.1)

## 2024-10-18 PROCEDURE — 85610 PROTHROMBIN TIME: CPT

## 2024-10-18 PROCEDURE — 36416 COLLJ CAPILLARY BLOOD SPEC: CPT

## 2024-10-18 NOTE — PROGRESS NOTES
ANTICOAGULATION MANAGEMENT     Alvin Ontiveros 69 year old male is on warfarin with therapeutic INR result. (Goal INR 2.0-3.0)    Recent labs: (last 7 days)     10/18/24  0837   INR 2.5*       ASSESSMENT     Source(s): Chart Review and Patient/Caregiver Call     Warfarin doses taken: Warfarin taken as instructed - confirmed new maintenance dose as advised last visit  Diet: No new diet changes identified  Medication/supplement changes: None noted  New illness, injury, or hospitalization: No  Signs or symptoms of bleeding or clotting: No  Previous result: Supratherapeutic  Additional findings: None       PLAN     Recommended plan for no diet, medication or health factor changes affecting INR     Dosing Instructions: Continue your current warfarin dose with next INR in 3 weeks       Summary  As of 10/18/2024      Full warfarin instructions:  2.5 mg every Mon, Wed, Fri; 5 mg all other days   Next INR check:  11/8/2024               Telephone call with Parmjit who verbalizes understanding and agrees to plan    Lab visit scheduled    Education provided: Please call back if any changes to your diet, medications or how you've been taking warfarin    Plan made per [facility] anticoagulation protocol    Elizabeth Oakley RN  10/18/2024  Anticoagulation Clinic  [employer] for routing messages: hamzah ZHAO [facility] patient phone line: [phone]        _______________________________________________________________________     Anticoagulation Episode Summary       Current INR goal:  2.0-3.0   TTR:  82.0% (1 y)   Target end date:  Indefinite   Send INR reminders to:  [facility]    Indications    Long-term (current) use of anticoagulants [Z79.01] [Z79.01]  Heterozygous factor V Leiden mutation (H) [D68.51]  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

## 2024-11-03 DIAGNOSIS — R73.03 PREDIABETES: ICD-10-CM

## 2024-11-06 RX ORDER — FOLIC ACID 1 MG/1
1000 TABLET ORAL DAILY
Qty: 90 TABLET | Refills: 0 | Status: SHIPPED | OUTPATIENT
Start: 2024-11-06

## 2024-11-08 ENCOUNTER — ANTICOAGULATION THERAPY VISIT (OUTPATIENT)
Dept: ANTICOAGULATION | Facility: CLINIC | Age: 69
End: 2024-11-08

## 2024-11-08 ENCOUNTER — LAB (OUTPATIENT)
Dept: LAB | Facility: CLINIC | Age: 69
End: 2024-11-08
Payer: COMMERCIAL

## 2024-11-08 DIAGNOSIS — D68.51 HETEROZYGOUS FACTOR V LEIDEN MUTATION (H): ICD-10-CM

## 2024-11-08 DIAGNOSIS — Z86.718 PERSONAL HISTORY OF VENOUS THROMBOSIS AND EMBOLISM: ICD-10-CM

## 2024-11-08 DIAGNOSIS — E03.4 HYPOTHYROIDISM DUE TO ACQUIRED ATROPHY OF THYROID: ICD-10-CM

## 2024-11-08 DIAGNOSIS — Z79.01 LONG TERM CURRENT USE OF ANTICOAGULANT THERAPY: Primary | ICD-10-CM

## 2024-11-08 DIAGNOSIS — I26.99 PULMONARY EMBOLISM, UNSPECIFIED CHRONICITY, UNSPECIFIED PULMONARY EMBOLISM TYPE, UNSPECIFIED WHETHER ACUTE COR PULMONALE PRESENT (H): ICD-10-CM

## 2024-11-08 DIAGNOSIS — Z79.01 LONG TERM CURRENT USE OF ANTICOAGULANT THERAPY: ICD-10-CM

## 2024-11-08 DIAGNOSIS — I10 ESSENTIAL HYPERTENSION: Primary | ICD-10-CM

## 2024-11-08 DIAGNOSIS — D68.52 PROTHROMBIN GENE MUTATION (H): ICD-10-CM

## 2024-11-08 DIAGNOSIS — E78.5 HYPERLIPIDEMIA LDL GOAL <130: ICD-10-CM

## 2024-11-08 LAB
ANION GAP SERPL CALCULATED.3IONS-SCNC: 9 MMOL/L (ref 7–15)
BUN SERPL-MCNC: 17.1 MG/DL (ref 8–23)
CALCIUM SERPL-MCNC: 12 MG/DL (ref 8.8–10.4)
CHLORIDE SERPL-SCNC: 104 MMOL/L (ref 98–107)
CHOLEST SERPL-MCNC: 162 MG/DL
CREAT SERPL-MCNC: 1 MG/DL (ref 0.67–1.17)
EGFRCR SERPLBLD CKD-EPI 2021: 81 ML/MIN/1.73M2
FASTING STATUS PATIENT QL REPORTED: YES
FASTING STATUS PATIENT QL REPORTED: YES
GLUCOSE SERPL-MCNC: 106 MG/DL (ref 70–99)
HCO3 SERPL-SCNC: 26 MMOL/L (ref 22–29)
HDLC SERPL-MCNC: 64 MG/DL
INR BLD: 2 (ref 0.9–1.1)
LDLC SERPL CALC-MCNC: 65 MG/DL
NONHDLC SERPL-MCNC: 98 MG/DL
POTASSIUM SERPL-SCNC: 4.2 MMOL/L (ref 3.4–5.3)
SODIUM SERPL-SCNC: 139 MMOL/L (ref 135–145)
TRIGL SERPL-MCNC: 166 MG/DL
TSH SERPL DL<=0.005 MIU/L-ACNC: 1.49 UIU/ML (ref 0.3–4.2)

## 2024-11-08 PROCEDURE — 80048 BASIC METABOLIC PNL TOTAL CA: CPT

## 2024-11-08 PROCEDURE — 85610 PROTHROMBIN TIME: CPT

## 2024-11-08 PROCEDURE — 84443 ASSAY THYROID STIM HORMONE: CPT

## 2024-11-08 PROCEDURE — 36415 COLL VENOUS BLD VENIPUNCTURE: CPT

## 2024-11-08 PROCEDURE — 80061 LIPID PANEL: CPT

## 2024-11-08 NOTE — PROGRESS NOTES
ANTICOAGULATION MANAGEMENT     Alvin Ontiveros 69 year old male is on warfarin with therapeutic INR result. (Goal INR 2.0-3.0)    Recent labs: (last 7 days)     11/08/24  0842   INR 2.0*       ASSESSMENT     Source(s): Chart Review and Patient/Caregiver Call     Warfarin doses taken: Warfarin taken as instructed  Diet: No new diet changes identified  Medication/supplement changes: None noted - noted stopping amlodipine back in September but no interaction with warfarin  New illness, injury, or hospitalization: No  Signs or symptoms of bleeding or clotting: No  Previous result: Therapeutic last visit; previously outside of goal range  Additional findings: None       PLAN     Recommended plan for no diet, medication or health factor changes affecting INR     Dosing Instructions: Continue your current warfarin dose with next INR in 4 weeks       Summary  As of 11/8/2024      Full warfarin instructions:  2.5 mg every Mon, Wed, Fri; 5 mg all other days   Next INR check:  12/6/2024               Telephone call with Parmjit who verbalizes understanding and agrees to plan    Lab visit scheduled    Education provided: Please call back if any changes to your diet, medications or how you've been taking warfarin    Plan made per [facility] anticoagulation protocol    Elizabeth Oakley RN  11/8/2024  Anticoagulation Clinic  [employer] for routing messages: hamzah MICHAEL [facility] patient phone line: [phone]        _______________________________________________________________________     Anticoagulation Episode Summary       Current INR goal:  2.0-3.0   TTR:  84.7% (1 y)   Target end date:  Indefinite   Send INR reminders to:  PAVEL MICHAEL [facility]    Indications    Long-term (current) use of anticoagulants [Z79.01] [Z79.01]  Heterozygous factor V Leiden mutation (H) [D68.51]  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

## 2024-11-11 DIAGNOSIS — D68.52 PROTHROMBIN GENE MUTATION (H): ICD-10-CM

## 2024-11-11 DIAGNOSIS — D68.51 HETEROZYGOUS FACTOR V LEIDEN MUTATION (H): ICD-10-CM

## 2024-11-11 DIAGNOSIS — Z86.718 PERSONAL HISTORY OF VENOUS THROMBOSIS AND EMBOLISM: ICD-10-CM

## 2024-11-11 DIAGNOSIS — I26.99 PULMONARY EMBOLISM, UNSPECIFIED CHRONICITY, UNSPECIFIED PULMONARY EMBOLISM TYPE, UNSPECIFIED WHETHER ACUTE COR PULMONALE PRESENT (H): ICD-10-CM

## 2024-11-11 DIAGNOSIS — Z79.01 LONG TERM CURRENT USE OF ANTICOAGULANT THERAPY: Primary | ICD-10-CM

## 2024-11-11 RX ORDER — WARFARIN SODIUM 5 MG/1
TABLET ORAL
Qty: 75 TABLET | Refills: 1 | Status: SHIPPED | OUTPATIENT
Start: 2024-11-11

## 2024-11-11 NOTE — TELEPHONE ENCOUNTER
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]  Personal history of venous thrombosis and embolism [Z86.718]  Pulmonary embolism  unspecified chronicity  unspecified pulmonary embolism type  unspecified whether acute cor pulmonale present (H) [I26.99]                 Anticoagulation Care Providers       Provider Role Specialty Phone number    Du Almaraz MD Referring Internal Medicine [phone]            Refill Criteria    Visit with referring provider/group: Meets criteria: visit within referring provider group in the last 15 months on 2/8/24    [facility] referral last signed: 07/11/2024; within last year: Yes    Lab monitoring not exceeding 2 weeks overdue: Yes    Alvin meets all criteria for refill. Rx instructions and quantity supplied updated to match patient's current dosing plan. Warfarin 90 day supply with 1 refill granted per [facility] protocol     Elizabeth Oakley RN  Anticoagulation Clinic

## 2024-11-19 ENCOUNTER — ANCILLARY PROCEDURE (OUTPATIENT)
Dept: GENERAL RADIOLOGY | Facility: CLINIC | Age: 69
End: 2024-11-19
Attending: NURSE PRACTITIONER
Payer: COMMERCIAL

## 2024-11-19 ENCOUNTER — OFFICE VISIT (OUTPATIENT)
Dept: INTERNAL MEDICINE | Facility: CLINIC | Age: 69
End: 2024-11-19
Payer: COMMERCIAL

## 2024-11-19 ENCOUNTER — TELEPHONE (OUTPATIENT)
Dept: INTERNAL MEDICINE | Facility: CLINIC | Age: 69
End: 2024-11-19

## 2024-11-19 VITALS
SYSTOLIC BLOOD PRESSURE: 142 MMHG | WEIGHT: 235.3 LBS | DIASTOLIC BLOOD PRESSURE: 80 MMHG | OXYGEN SATURATION: 98 % | HEIGHT: 72 IN | HEART RATE: 68 BPM | RESPIRATION RATE: 16 BRPM | TEMPERATURE: 97.2 F | BODY MASS INDEX: 31.87 KG/M2

## 2024-11-19 DIAGNOSIS — M25.572 ACUTE LEFT ANKLE PAIN: ICD-10-CM

## 2024-11-19 DIAGNOSIS — M25.572 ACUTE LEFT ANKLE PAIN: Primary | ICD-10-CM

## 2024-11-19 PROCEDURE — 73610 X-RAY EXAM OF ANKLE: CPT | Mod: TC | Performed by: RADIOLOGY

## 2024-11-19 PROCEDURE — G2211 COMPLEX E/M VISIT ADD ON: HCPCS | Performed by: NURSE PRACTITIONER

## 2024-11-19 PROCEDURE — 99214 OFFICE O/P EST MOD 30 MIN: CPT | Performed by: NURSE PRACTITIONER

## 2024-11-19 NOTE — TELEPHONE ENCOUNTER
Patient Quality Outreach    Patient is due for the following:   Physical Annual Wellness Visit    Action(s) Taken:   Patient has upcoming appointment, these items will be addressed at that time. 2/05/2025 appointment with Dr. Almaraz for annual wellness visit.    Type of outreach:    Chart review performed, no outreach needed.    Questions for provider review:    None           Vanessa Temple, CMA

## 2024-11-19 NOTE — PATIENT INSTRUCTIONS
X-ray obtained today.  I will have the official radiologist report tomorrow and I will let you know this result once available.    In the meantime I think symptoms are consistent with a strain of the ligaments in your ankle.  We will put you in a Cam walking boot that you will wear when you are up and moving around.  It is okay to take this at nighttime or if you are sitting on the couch and watching TV.  You can also take it off to ice the ankle as needed.    A referral was placed for physical therapy to help alleviate your pain and strengthen up the ankle.    If symptoms are not improving with the boot and physical therapy neck step would be to obtain an MRI of that ankle.

## 2024-11-19 NOTE — PROGRESS NOTES
"  Assessment & Plan     Acute left ankle pain  Parmjit is a pleasant 69-year-old male here today for evaluation of left ankle pain that has been occurring for about 1 month.  Started after golfing.  Has progressively gotten worse.  States most the pain is around the Achilles tendon and the inner aspect of the left ankle.  Has not noticed any significant swelling or bruising.  No known specific injury.  No history of pain to this ankle in the past.  Does have a history of some neuropathy in the past but this pain is different.  States that it feels sore and it makes it difficult to walk at times.  There is no significant findings on physical exam however I did obtain an x-ray at this time to rule out possible small fracture as a cause of his symptoms.  Upon my independent review of the x-ray I do not see any obvious fractures or dislocations.  I discussed symptoms are likely related to a strain of the ankle.  Patient was placed in a cam boot for comfort and a referral for physical therapy was placed.  We discussed if pain does not improve over the next few weeks with a cam boot and physical therapy neck step would be to obtain an MRI of that ankle.  We discussed possible prescription medication to help with pain but he states at this time pain is tolerable and prescription is not needed.  - XR Ankle Left G/E 3 Views; Future  - Physical Therapy  Referral; Future  - Ankle/Foot Bracing Supplies Order Walking Boot; Left; Pneumatic; Tall    The longitudinal plan of care for the diagnosis(es)/condition(s) as documented were addressed during this visit. Due to the added complexity in care, I will continue to support Parmjit in the subsequent management and with ongoing continuity of care.      BMI  Estimated body mass index is 31.53 kg/m  as calculated from the following:    Height as of this encounter: 1.84 m (6' 0.44\").    Weight as of this encounter: 106.7 kg (235 lb 4.8 oz).       Subjective   Parmjit is a 69 year old, " "presenting for the following health issues:  Pain (L Calf Pain x1 Month, After golf got home and calf was hurting, Hurts when walking as well. )      11/19/2024     3:50 PM   Additional Questions   Roomed by Carey     Pain    History of Present Illness       Reason for visit:  Right foot  Symptom onset:  3-4 weeks ago  Symptoms include:  Pain  Symptom intensity:  Severe  Symptom progression:  Staying the same  Had these symptoms before:  No  What makes it worse:  Walking  What makes it better:  No   He is taking medications regularly.               ROS  Comprehensive 12-point review of systems was completed and negative except as noted in HPI.        Objective    BP (!) 142/80   Pulse 68   Temp 97.2  F (36.2  C) (Temporal)   Resp 16   Ht 1.84 m (6' 0.44\")   Wt 106.7 kg (235 lb 4.8 oz)   SpO2 98%   BMI 31.53 kg/m    Body mass index is 31.53 kg/m .  Physical Exam   Constitutional: In no acute distress.  Clean appearance.  Cardiovascular: Regular rate.  Respiratory: Normal respiratory effort.  Skin: Skin is pink, warm and dry.     Musculoskeletal: Left ankle with some tenderness with palpation along the medial aspect between the medial malleolus and the Achilles tendon.  Normal range of motion of the ankle.  Some slight swelling noted to that area of tenderness.  No bruising.  No other deformities.  Normal gait.  Psychiatric: Appropriate affect and demeanor.  Memory intact.  Good insight and judgment.  Neurologic: No tremor or involuntary movement noted.              Signed Electronically by: Elizabeth Laws NP    "

## 2024-11-20 ENCOUNTER — TELEPHONE (OUTPATIENT)
Dept: INTERNAL MEDICINE | Facility: CLINIC | Age: 69
End: 2024-11-20

## 2024-11-20 ENCOUNTER — THERAPY VISIT (OUTPATIENT)
Dept: PHYSICAL THERAPY | Facility: CLINIC | Age: 69
End: 2024-11-20
Attending: NURSE PRACTITIONER
Payer: COMMERCIAL

## 2024-11-20 DIAGNOSIS — M25.572 ACUTE LEFT ANKLE PAIN: ICD-10-CM

## 2024-11-20 PROCEDURE — 97161 PT EVAL LOW COMPLEX 20 MIN: CPT | Mod: GP | Performed by: PHYSICAL THERAPIST

## 2024-11-20 PROCEDURE — 97110 THERAPEUTIC EXERCISES: CPT | Mod: GP | Performed by: PHYSICAL THERAPIST

## 2024-11-20 ASSESSMENT — ACTIVITIES OF DAILY LIVING (ADL)
PERFORMING_HEAVY_ACTIVITIES_AROUND_YOUR_HOME: MODERATE DIFFICULTY
WALKING_2_BLOCKS: QUITE A BIT OF DIFFICULTY
ROLLING_OVER_IN_BED: A LITTLE BIT OF DIFFICULTY
WALKING_A_MILE: EXTREME DIFFICULTY OR UNABLE TO PERFORM ACTIVITY
GETTING_INTO_OR_OUT_OF_A_CAR: A LITTLE BIT OF DIFFICULTY
SQUATTING: A LITTLE BIT OF DIFFICULTY
PERFORMING_LIGHT_ACTIVITIES_AROUND_YOUR_HOME: A LITTLE BIT OF DIFFICULTY
MAKING_SHARP_TURNS_WHILE_RUNNING_FAST: QUITE A BIT OF DIFFICULTY
WALKING_BETWEEN_ROOMS: MODERATE DIFFICULTY
GOING_UP_OR_DOWN_10_STAIRS: MODERATE DIFFICULTY
SITTING_FOR_1_HOUR: A LITTLE BIT OF DIFFICULTY
YOUR_USUAL_HOBBIES,_RECREATIONAL_OR_SPORTING_ACTIVITIES: QUITE A BIT OF DIFFICULTY
ANY_OF_YOUR_USUAL_WORK,_HOUSEWORK_OR_SCHOOL_ACTIVITIES: MODERATE DIFFICULTY
STANDING_FOR_1_HOUR: QUITE A BIT OF DIFFICULTY
SHOPPING: QUITE A BIT OF DIFFICULTY
LIFTING_AN_OBJECT,_LIKE_A_BAG_OF_GROCERIES_FROM_THE_FLOOR: A LITTLE BIT OF DIFFICULTY
PLEASE_INDICATE_YOR_PRIMARY_REASON_FOR_REFERRAL_TO_THERAPY:: FOOT AND/OR ANKLE
RUNNING_ON_UNEVEN_GROUND: QUITE A BIT OF DIFFICULTY
LEFS_RAW_SCORE: 0
RUNNING_ON_EVEN_GROUND: QUITE A BIT OF DIFFICULTY
PUTTING_ON_YOUR_SHOES_OR_SOCKS: NO DIFFICULTY
GETTING_INTO_AND_OUT_OF_A_BATH: A LITTLE BIT OF DIFFICULTY
LEFS_SCORE(%): 0

## 2024-11-20 NOTE — TELEPHONE ENCOUNTER
Relayed to patient. He has his first PT appt today and will follow-up with pcp if PT does not help improve things.       ----- Message from Elizabeth Laws sent at 11/20/2024 12:54 PM CST -----  Please let patient know that there is x-ray report shows some thickening along the area of Achilles tendon which suggest possible inflammation of this tendon.  There is no fracture or dislocation noted on the x-ray and the Achilles still appears to be intact.  We will plan to continue with the cam boot over the next several weeks and physical therapy.  If symptoms do not improve as anticipated that an MRI would be the next labs and a referral to orthopedic specialist.    Elizabeth Laws, CNP

## 2024-11-20 NOTE — PROGRESS NOTES
PHYSICAL THERAPY EVALUATION  Type of Visit: Evaluation       Fall Risk Screen:  Fall screen completed by: PT  Have you fallen 2 or more times in the past year?: No  Have you fallen and had an injury in the past year?: No  Is patient a fall risk?: No    Subjective         Presenting condition or subjective complaint: (Patient-Rptd) ankle    Was playing golf 1 month ago (generally plays 18 holes w/ a cart once to twice per week in very old golf shoes), came home and realized his posterior L ankle was in pain (describes as annoying). Was in [place] last week (wearing crocs - his preferred shoes) and realized his walking was impacted and he needed to be seen. Was seen by primary care yesterday, prescribed a CAM boot for 4-6 weeks. States pain increases when walking, finds he compensates as a result and slows his mariah. Stopped walking for exercise as a result. Stairs and prolonged standing (30-40 min) also cause pain. Ice helps as does the CAM. Has taken some Advil, but unsure it helps, so hasn't been taking much.    Typically walks a couple miles per day, but mostly 3-4x/wk (in old [employer] tennis shoes).     Date of onset: 10/20/24    Relevant medical history:     Dates & types of surgery:      Prior diagnostic imaging/testing results:         L Ankle Xray 11/19/2024  IMPRESSION: Small plantar calcaneal enthesophyte. Thickening in the region of the Achilles tendon raising the question of Achilles tendon pathology. Arterial calcifications. Otherwise negative. No fracture or talar dome osteochondral lesion.     Prior therapy history for the same diagnosis, illness or injury: (Patient-Rptd) No      Prior Level of Function  Transfers: Independent  Ambulation: Independent  ADL: Independent  IADL:     Living Environment  Social support: (Patient-Rptd) With a significant other or spouse   Type of home: (Patient-Rptd) House   Stairs to enter the home:         Ramp: (Patient-Rptd) No   Stairs inside the home: (Patient-Rptd)  Yes (Patient-Rptd) 15 Is there a railing: (Patient-Rptd) Yes     Help at home: (Patient-Rptd) None  Equipment owned:       Employment:    retired  Hobbies/Interests:   golf, walking    Patient goals for therapy: (Patient-Rptd) gen    Pain assessment: Location: L post ankle/Ratin/10  at worst 4/10     Objective     Standing Alignment:                Ankle/Foot:  Pes cavus R and pes cavus L  General Deviations:  Toe out R and toe out L  Gait:    Gait Type:  Antalgic      Assistive Devices:  CAM        Ankle/Foot Evaluation  ROM:    AROM:    Dorsiflexion:  Left:   1  Right:   6  Plantarflexion:  Left:  54    Right:  54  Inversion:  Left:  20     Right:  20  Eversion:  21     Right:  39        Strength:    Dorsiflexion:  Left:  5-/5      Pain:+   Right:  5/5    Pain:  Plantarflexion: Left:  4+/5    Pain:++   Right:  5/5   Pain:  Inversion:Left:  4-/5   Pain:+     Right:  4/5   Pain:  Eversion:Left:  5-/5   Pain:+  Right:  5/5   Pain:                  SPECIAL TESTS:     Left ankle negative for the following special tests:  Raza sign    PALPATION:   Left ankle tenderness present at:  achilles tendon  Left ankle tenderness not present at:   gastroc/soleus    Right ankle tenderness not present at:  gastroc/soleus or achilles tendon  EDEMA:   Left ankle edema present at: 71cm  Right ankle edema present at:  69cm      Figure 8 left: 71cmFigure 8 right: 69cm  MOBILITY TESTING:               Talocrural Left: hypomobile    Talocrural Right: hypomobile  Subtalar Left: hypomobile    Subtalar Right: hypomobile  Midtarsal Left: normal    Midtarsal Right: normal        FUNCTIONAL TESTS:           Proprioception:  Stork Balance Test: Left: 10 sec w/ compensatory patterns  Right: 10 sec                                                 General       Assessment & Plan   CLINICAL IMPRESSIONS  Medical Diagnosis: Acute left ankle pain    Treatment Diagnosis: Acute left ankle pain   Impression/Assessment: Patient is a 69 year old male  with L ankle complaints.  The following significant findings have been identified: Pain, Decreased ROM/flexibility, Decreased joint mobility, Decreased strength, Impaired balance, Decreased proprioception, Edema, Impaired gait, Impaired muscle performance, and Decreased activity tolerance. These impairments interfere with their ability to perform recreational activities, household chores, household mobility, and community mobility as compared to previous level of function.     Clinical Decision Making (Complexity):  Clinical Presentation: Stable/Uncomplicated  Clinical Presentation Rationale: based on medical and personal factors listed in PT evaluation  Clinical Decision Making (Complexity): Low complexity    PLAN OF CARE  Treatment Interventions:  Modalities: Iontophoresis  Interventions: Gait Training, Manual Therapy, Neuromuscular Re-education, Therapeutic Activity, Therapeutic Exercise, Self-Care/Home Management    Long Term Goals     PT Goal 1  Goal Identifier: walking  Goal Description: Patient will be able to walk 2 miles without L ankle pain or antalgic pattern in order for community mobility and healthy active lifestyle.  Target Date: 02/12/25      Frequency of Treatment: 1x/wk for 12 weeks  Duration of Treatment: 12 weeks    Recommended Referrals to Other Professionals:   Education Assessment:   Learner/Method: Patient;Listening;Demonstration;Pictures/Video  Education Comments: Educated on findings of exam, importance of HEP, likely frequency/duration of PT    Risks and benefits of evaluation/treatment have been explained.   Patient/Family/caregiver agrees with Plan of Care.     Evaluation Time:     PT Eval, Low Complexity Minutes (03790): 30       Signing Clinician: Kadi Cam PT        [facility]                                                                                   OUTPATIENT PHYSICAL THERAPY      PLAN OF TREATMENT FOR OUTPATIENT REHABILITATION   Patient's  Last Name, First Name, RAYMOND OntiverosAlvin YOB: 1955   Provider's Name   [facility]   Medical Record No.  [id number]     Onset Date: 10/20/24  Start of Care Date: 11/20/24     Medical Diagnosis:  Acute left ankle pain      PT Treatment Diagnosis:  Acute left ankle pain Plan of Treatment  Frequency/Duration: 1x/wk for 12 weeks/ 12 weeks    Certification date from 11/20/24 to 02/12/25         See note for plan of treatment details and functional goals     Kadi Cam, PT                         I CERTIFY THE NEED FOR THESE SERVICES FURNISHED UNDER        THIS PLAN OF TREATMENT AND WHILE UNDER MY CARE     (Physician attestation of this document indicates review and certification of the therapy plan).              Referring Provider:  Elizabeth Laws    Initial Assessment  See Epic Evaluation- Start of Care Date: 11/20/24

## 2024-12-02 DIAGNOSIS — E78.5 HYPERLIPIDEMIA LDL GOAL <130: ICD-10-CM

## 2024-12-02 RX ORDER — PRAVASTATIN SODIUM 40 MG
TABLET ORAL
Qty: 90 TABLET | Refills: 0 | Status: SHIPPED | OUTPATIENT
Start: 2024-12-02

## 2024-12-16 ENCOUNTER — THERAPY VISIT (OUTPATIENT)
Dept: PHYSICAL THERAPY | Facility: CLINIC | Age: 69
End: 2024-12-16
Payer: COMMERCIAL

## 2024-12-16 DIAGNOSIS — M25.572 ACUTE LEFT ANKLE PAIN: Primary | ICD-10-CM

## 2024-12-16 PROCEDURE — 97110 THERAPEUTIC EXERCISES: CPT | Mod: GP | Performed by: PHYSICAL THERAPIST

## 2024-12-16 PROCEDURE — 97140 MANUAL THERAPY 1/> REGIONS: CPT | Mod: GP | Performed by: PHYSICAL THERAPIST

## 2024-12-18 DIAGNOSIS — E03.4 HYPOTHYROIDISM DUE TO ACQUIRED ATROPHY OF THYROID: ICD-10-CM

## 2024-12-18 RX ORDER — LEVOTHYROXINE SODIUM 75 UG/1
TABLET ORAL
Qty: 90 TABLET | Refills: 3 | OUTPATIENT
Start: 2024-12-18

## 2024-12-23 ENCOUNTER — THERAPY VISIT (OUTPATIENT)
Dept: PHYSICAL THERAPY | Facility: CLINIC | Age: 69
End: 2024-12-23
Payer: COMMERCIAL

## 2024-12-23 DIAGNOSIS — M25.572 ACUTE LEFT ANKLE PAIN: Primary | ICD-10-CM

## 2024-12-23 PROCEDURE — 97140 MANUAL THERAPY 1/> REGIONS: CPT | Mod: GP | Performed by: PHYSICAL THERAPIST

## 2024-12-23 PROCEDURE — 97110 THERAPEUTIC EXERCISES: CPT | Mod: GP | Performed by: PHYSICAL THERAPIST

## 2024-12-31 DIAGNOSIS — K21.9 GASTROESOPHAGEAL REFLUX DISEASE WITHOUT ESOPHAGITIS: ICD-10-CM

## 2025-01-13 ENCOUNTER — THERAPY VISIT (OUTPATIENT)
Dept: PHYSICAL THERAPY | Facility: CLINIC | Age: 70
End: 2025-01-13
Payer: COMMERCIAL

## 2025-01-13 DIAGNOSIS — M25.572 ACUTE LEFT ANKLE PAIN: Primary | ICD-10-CM

## 2025-01-13 PROCEDURE — 97110 THERAPEUTIC EXERCISES: CPT | Mod: GP | Performed by: PHYSICAL THERAPIST

## 2025-01-13 PROCEDURE — 97140 MANUAL THERAPY 1/> REGIONS: CPT | Mod: GP | Performed by: PHYSICAL THERAPIST

## 2025-01-13 PROCEDURE — 97112 NEUROMUSCULAR REEDUCATION: CPT | Mod: GP | Performed by: PHYSICAL THERAPIST

## 2025-01-22 ENCOUNTER — DOCUMENTATION ONLY (OUTPATIENT)
Dept: INTERNAL MEDICINE | Facility: CLINIC | Age: 70
End: 2025-01-22
Payer: COMMERCIAL

## 2025-01-22 DIAGNOSIS — Z79.899 MEDICATION MANAGEMENT: ICD-10-CM

## 2025-01-22 DIAGNOSIS — Z13.1 SCREENING FOR DIABETES MELLITUS: Primary | ICD-10-CM

## 2025-01-22 DIAGNOSIS — Z12.5 SCREENING PSA (PROSTATE SPECIFIC ANTIGEN): ICD-10-CM

## 2025-01-22 DIAGNOSIS — R73.03 PREDIABETES: ICD-10-CM

## 2025-01-22 DIAGNOSIS — Z00.00 ENCOUNTER FOR MEDICARE ANNUAL WELLNESS EXAM: ICD-10-CM

## 2025-01-22 DIAGNOSIS — Z13.220 SCREENING FOR HYPERLIPIDEMIA: ICD-10-CM

## 2025-01-22 DIAGNOSIS — E03.4 HYPOTHYROIDISM DUE TO ACQUIRED ATROPHY OF THYROID: ICD-10-CM

## 2025-01-22 NOTE — PROGRESS NOTES
Alvin Ontiveros has an upcoming lab appointment:    Future Appointments   Date Time Provider Department Center   1/27/2025  8:00 AM [facility]   1/27/2025  1:10 PM Kadi Cam PT [facility]   2/5/2025  2:00 PM Du Almaraz MD [facility]   2/21/2025  8:30 AM [facility]         There is no order available. Please review and place either future orders or HMPO (Review of Health Maintenance Protocol Orders), as appropriate.    Health Maintenance Due   Topic    ANNUAL REVIEW OF HM ORDERS      Gisela Acuña

## 2025-01-27 ENCOUNTER — LAB (OUTPATIENT)
Dept: LAB | Facility: CLINIC | Age: 70
End: 2025-01-27
Payer: COMMERCIAL

## 2025-01-27 DIAGNOSIS — Z13.220 SCREENING FOR HYPERLIPIDEMIA: ICD-10-CM

## 2025-01-27 DIAGNOSIS — Z79.899 MEDICATION MANAGEMENT: ICD-10-CM

## 2025-01-27 DIAGNOSIS — Z13.1 SCREENING FOR DIABETES MELLITUS: ICD-10-CM

## 2025-01-27 DIAGNOSIS — Z12.5 SCREENING PSA (PROSTATE SPECIFIC ANTIGEN): ICD-10-CM

## 2025-01-27 DIAGNOSIS — R73.03 PREDIABETES: ICD-10-CM

## 2025-01-27 DIAGNOSIS — E03.4 HYPOTHYROIDISM DUE TO ACQUIRED ATROPHY OF THYROID: ICD-10-CM

## 2025-01-27 LAB
ALBUMIN SERPL BCG-MCNC: 4.2 G/DL (ref 3.5–5.2)
ALP SERPL-CCNC: 53 U/L (ref 40–150)
ALT SERPL W P-5'-P-CCNC: 28 U/L (ref 0–70)
ANION GAP SERPL CALCULATED.3IONS-SCNC: 7 MMOL/L (ref 7–15)
AST SERPL W P-5'-P-CCNC: 22 U/L (ref 0–45)
BILIRUB SERPL-MCNC: 0.7 MG/DL
BUN SERPL-MCNC: 17.2 MG/DL (ref 8–23)
CALCIUM SERPL-MCNC: 11.5 MG/DL (ref 8.8–10.4)
CHLORIDE SERPL-SCNC: 104 MMOL/L (ref 98–107)
CHOLEST SERPL-MCNC: 162 MG/DL
CREAT SERPL-MCNC: 0.91 MG/DL (ref 0.67–1.17)
EGFRCR SERPLBLD CKD-EPI 2021: >90 ML/MIN/1.73M2
ERYTHROCYTE [DISTWIDTH] IN BLOOD BY AUTOMATED COUNT: 12.5 % (ref 10–15)
EST. AVERAGE GLUCOSE BLD GHB EST-MCNC: 120 MG/DL
FASTING STATUS PATIENT QL REPORTED: YES
FASTING STATUS PATIENT QL REPORTED: YES
GLUCOSE SERPL-MCNC: 125 MG/DL (ref 70–99)
HBA1C MFR BLD: 5.8 % (ref 0–5.6)
HCO3 SERPL-SCNC: 30 MMOL/L (ref 22–29)
HCT VFR BLD AUTO: 43.1 % (ref 40–53)
HDLC SERPL-MCNC: 58 MG/DL
HGB BLD-MCNC: 15.2 G/DL (ref 13.3–17.7)
LDLC SERPL CALC-MCNC: 87 MG/DL
MCH RBC QN AUTO: 30.3 PG (ref 26.5–33)
MCHC RBC AUTO-ENTMCNC: 35.3 G/DL (ref 31.5–36.5)
MCV RBC AUTO: 86 FL (ref 78–100)
NONHDLC SERPL-MCNC: 104 MG/DL
PLATELET # BLD AUTO: 120 10E3/UL (ref 150–450)
POTASSIUM SERPL-SCNC: 4.1 MMOL/L (ref 3.4–5.3)
PROT SERPL-MCNC: 6.9 G/DL (ref 6.4–8.3)
PSA SERPL DL<=0.01 NG/ML-MCNC: 0.92 NG/ML (ref 0–6.5)
RBC # BLD AUTO: 5.02 10E6/UL (ref 4.4–5.9)
SODIUM SERPL-SCNC: 141 MMOL/L (ref 135–145)
TRIGL SERPL-MCNC: 85 MG/DL
TSH SERPL DL<=0.005 MIU/L-ACNC: 2.52 UIU/ML (ref 0.3–4.2)
WBC # BLD AUTO: 3.2 10E3/UL (ref 4–11)

## 2025-01-27 PROCEDURE — 84443 ASSAY THYROID STIM HORMONE: CPT

## 2025-01-27 PROCEDURE — G0103 PSA SCREENING: HCPCS

## 2025-01-27 PROCEDURE — 80053 COMPREHEN METABOLIC PANEL: CPT

## 2025-01-27 PROCEDURE — 83036 HEMOGLOBIN GLYCOSYLATED A1C: CPT

## 2025-01-27 PROCEDURE — 85027 COMPLETE CBC AUTOMATED: CPT

## 2025-01-27 PROCEDURE — 80061 LIPID PANEL: CPT

## 2025-01-27 PROCEDURE — 36415 COLL VENOUS BLD VENIPUNCTURE: CPT

## 2025-02-03 DIAGNOSIS — R73.03 PREDIABETES: ICD-10-CM

## 2025-02-05 ENCOUNTER — OFFICE VISIT (OUTPATIENT)
Dept: INTERNAL MEDICINE | Facility: CLINIC | Age: 70
End: 2025-02-05
Payer: COMMERCIAL

## 2025-02-05 VITALS
RESPIRATION RATE: 18 BRPM | HEART RATE: 82 BPM | HEIGHT: 72 IN | OXYGEN SATURATION: 96 % | WEIGHT: 230 LBS | TEMPERATURE: 98.4 F | DIASTOLIC BLOOD PRESSURE: 74 MMHG | SYSTOLIC BLOOD PRESSURE: 134 MMHG | BODY MASS INDEX: 31.15 KG/M2

## 2025-02-05 DIAGNOSIS — D68.51 HETEROZYGOUS FACTOR V LEIDEN MUTATION: ICD-10-CM

## 2025-02-05 DIAGNOSIS — Z12.5 SCREENING PSA (PROSTATE SPECIFIC ANTIGEN): ICD-10-CM

## 2025-02-05 DIAGNOSIS — E83.52 HYPERCALCEMIA: ICD-10-CM

## 2025-02-05 DIAGNOSIS — N52.01 ERECTILE DYSFUNCTION DUE TO ARTERIAL INSUFFICIENCY: ICD-10-CM

## 2025-02-05 DIAGNOSIS — Z00.00 ENCOUNTER FOR MEDICARE ANNUAL WELLNESS EXAM: Primary | ICD-10-CM

## 2025-02-05 DIAGNOSIS — K21.9 GASTROESOPHAGEAL REFLUX DISEASE WITHOUT ESOPHAGITIS: ICD-10-CM

## 2025-02-05 DIAGNOSIS — E03.4 HYPOTHYROIDISM DUE TO ACQUIRED ATROPHY OF THYROID: ICD-10-CM

## 2025-02-05 DIAGNOSIS — Z79.899 MEDICATION MANAGEMENT: ICD-10-CM

## 2025-02-05 DIAGNOSIS — F10.21 ALCOHOL DEPENDENCE IN REMISSION (H): ICD-10-CM

## 2025-02-05 DIAGNOSIS — R73.03 PREDIABETES: ICD-10-CM

## 2025-02-05 DIAGNOSIS — I10 ESSENTIAL HYPERTENSION: ICD-10-CM

## 2025-02-05 DIAGNOSIS — Z86.718 PERSONAL HISTORY OF VENOUS THROMBOSIS AND EMBOLISM: ICD-10-CM

## 2025-02-05 DIAGNOSIS — E78.5 HYPERLIPIDEMIA LDL GOAL <130: ICD-10-CM

## 2025-02-05 PROCEDURE — 99214 OFFICE O/P EST MOD 30 MIN: CPT | Mod: 25 | Performed by: INTERNAL MEDICINE

## 2025-02-05 PROCEDURE — G0439 PPPS, SUBSEQ VISIT: HCPCS | Performed by: INTERNAL MEDICINE

## 2025-02-05 RX ORDER — FOLIC ACID 1 MG/1
1000 TABLET ORAL DAILY
Qty: 90 TABLET | Refills: 0 | OUTPATIENT
Start: 2025-02-05

## 2025-02-05 RX ORDER — HYDROCHLOROTHIAZIDE 25 MG/1
25 TABLET ORAL DAILY
Qty: 90 TABLET | Refills: 3 | Status: SHIPPED | OUTPATIENT
Start: 2025-02-05

## 2025-02-05 RX ORDER — LEVOTHYROXINE SODIUM 75 UG/1
TABLET ORAL
Qty: 90 TABLET | Refills: 3 | Status: SHIPPED | OUTPATIENT
Start: 2025-02-05

## 2025-02-05 RX ORDER — WARFARIN SODIUM 5 MG/1
TABLET ORAL
Qty: 75 TABLET | Refills: 3 | Status: SHIPPED | OUTPATIENT
Start: 2025-02-05

## 2025-02-05 RX ORDER — OMEPRAZOLE 20 MG/1
20 CAPSULE, DELAYED RELEASE ORAL DAILY
Qty: 90 CAPSULE | Refills: 3 | Status: SHIPPED | OUTPATIENT
Start: 2025-02-05

## 2025-02-05 RX ORDER — TADALAFIL 20 MG/1
20 TABLET ORAL DAILY PRN
Qty: 30 TABLET | Refills: 3 | Status: SHIPPED | OUTPATIENT
Start: 2025-02-05

## 2025-02-05 RX ORDER — PRAVASTATIN SODIUM 40 MG
TABLET ORAL
Qty: 90 TABLET | Refills: 3 | Status: SHIPPED | OUTPATIENT
Start: 2025-02-05

## 2025-02-05 RX ORDER — FOLIC ACID 1 MG/1
1000 TABLET ORAL DAILY
Qty: 90 TABLET | Refills: 3 | Status: SHIPPED | OUTPATIENT
Start: 2025-02-05

## 2025-02-05 RX ORDER — LOSARTAN POTASSIUM 100 MG/1
100 TABLET ORAL DAILY
Qty: 90 TABLET | Refills: 3 | Status: SHIPPED | OUTPATIENT
Start: 2025-02-05

## 2025-02-05 SDOH — HEALTH STABILITY: PHYSICAL HEALTH: ON AVERAGE, HOW MANY DAYS PER WEEK DO YOU ENGAGE IN MODERATE TO STRENUOUS EXERCISE (LIKE A BRISK WALK)?: 5 DAYS

## 2025-02-05 ASSESSMENT — SOCIAL DETERMINANTS OF HEALTH (SDOH): HOW OFTEN DO YOU GET TOGETHER WITH FRIENDS OR RELATIVES?: MORE THAN THREE TIMES A WEEK

## 2025-02-05 NOTE — PATIENT INSTRUCTIONS
Patient Education   Preventive Care Advice   This is general advice given by our system to help you stay healthy. However, your care team may have specific advice just for you. Please talk to your care team about your preventive care needs.  Nutrition  Eat 5 or more servings of fruits and vegetables each day.  Try wheat bread, brown rice and whole grain pasta (instead of white bread, rice, and pasta).  Get enough calcium and vitamin D. Check the label on foods and aim for 100% of the RDA (recommended daily allowance).  Lifestyle  Exercise at least 150 minutes each week  (30 minutes a day, 5 days a week).  Do muscle strengthening activities 2 days a week. These help control your weight and prevent disease.  No smoking.  Wear sunscreen to prevent skin cancer.  Have a dental exam and cleaning every 6 months.  Yearly exams  See your health care team every year to talk about:  Any changes in your health.  Any medicines your care team has prescribed.  Preventive care, family planning, and ways to prevent chronic diseases.  Shots (vaccines)   HPV shots (up to age 26), if you've never had them before.  Hepatitis B shots (up to age 59), if you've never had them before.  COVID-19 shot: Get this shot when it's due.  Flu shot: Get a flu shot every year.  Tetanus shot: Get a tetanus shot every 10 years.  Pneumococcal, hepatitis A, and RSV shots: Ask your care team if you need these based on your risk.  Shingles shot (for age 50 and up)  General health tests  Diabetes screening:  Starting at age 35, Get screened for diabetes at least every 3 years.  If you are younger than age 35, ask your care team if you should be screened for diabetes.  Cholesterol test: At age 39, start having a cholesterol test every 5 years, or more often if advised.  Bone density scan (DEXA): At age 50, ask your care team if you should have this scan for osteoporosis (brittle bones).  Hepatitis C: Get tested at least once in your life.  STIs (sexually  transmitted infections)  Before age 24: Ask your care team if you should be screened for STIs.  After age 24: Get screened for STIs if you're at risk. You are at risk for STIs (including HIV) if:  You are sexually active with more than one person.  You don't use condoms every time.  You or a partner was diagnosed with a sexually transmitted infection.  If you are at risk for HIV, ask about PrEP medicine to prevent HIV.  Get tested for HIV at least once in your life, whether you are at risk for HIV or not.  Cancer screening tests  Cervical cancer screening: If you have a cervix, begin getting regular cervical cancer screening tests starting at age 21.  Breast cancer scan (mammogram): If you've ever had breasts, begin having regular mammograms starting at age 40. This is a scan to check for breast cancer.  Colon cancer screening: It is important to start screening for colon cancer at age 45.  Have a colonoscopy test every 10 years (or more often if you're at risk) Or, ask your provider about stool tests like a FIT test every year or Cologuard test every 3 years.  To learn more about your testing options, visit:   .  For help making a decision, visit:   https://bit.ly/oz59872.  Prostate cancer screening test: If you have a prostate, ask your care team if a prostate cancer screening test (PSA) at age 55 is right for you.  Lung cancer screening: If you are a current or former smoker ages 50 to 80, ask your care team if ongoing lung cancer screenings are right for you.  For informational purposes only. Not to replace the advice of your health care provider. Copyright   2023 [facility] [employer]. All rights reserved. Clinically reviewed by the [facility] Transitions Program. [employer] 067234 - REV 01/24.  Hearing Loss: Care Instructions  Overview     Hearing loss is a sudden or slow decrease in how well you hear. It can range from slight to profound. Permanent hearing loss can occur with aging. It also can  happen when you are exposed long-term to loud noise. Examples include listening to loud music, riding motorcycles, or being around other loud machines.  Hearing loss can affect your work and home life. It can make you feel lonely or depressed. You may feel that you have lost your independence. But hearing aids and other devices can help you hear better and feel connected to others.  Follow-up care is a key part of your treatment and safety. Be sure to make and go to all appointments, and call your doctor if you are having problems. It's also a good idea to know your test results and keep a list of the medicines you take.  How can you care for yourself at home?  Avoid loud noises whenever possible. This helps keep your hearing from getting worse.  Always wear hearing protection around loud noises.  Wear a hearing aid as directed.  A professional can help you pick a hearing aid that will work best for you.  You can also get hearing aids over the counter for mild to moderate hearing loss.  Have hearing tests as your doctor suggests. They can show whether your hearing has changed. Your hearing aid may need to be adjusted.  Use other devices as needed. These may include:  Telephone amplifiers and hearing aids that can connect to a television, stereo, radio, or microphone.  Devices that use lights or vibrations. These alert you to the doorbell, a ringing telephone, or a baby monitor.  Television closed-captioning. This shows the words at the bottom of the screen. Most new TVs can do this.  TTY (text telephone). This lets you type messages back and forth on the telephone instead of talking or listening. These devices are also called TDD. When messages are typed on the keyboard, they are sent over the phone line to a receiving TTY. The message is shown on a monitor.  Use text messaging, social media, and email if it is hard for you to communicate by telephone.  Try to learn a listening technique called speechreading. It is  "not lipreading. You pay attention to people's gestures, expressions, posture, and tone of voice. These clues can help you understand what a person is saying. Face the person you are talking to, and have them face you. Make sure the lighting is good. You need to see the other person's face clearly.  Think about counseling if you need help to adjust to your hearing loss.  When should you call for help?  Watch closely for changes in your health, and be sure to contact your doctor if:    You think your hearing is getting worse.     You have new symptoms, such as dizziness or nausea.   Where can you learn more?  Go to https://www.[email or web address].Kiggit/patiented  Enter R798 in the search box to learn more about \"Hearing Loss: Care Instructions.\"  Current as of: September 27, 2023  Content Version: 14.3    2024 [employer].   Care instructions adapted under license by your healthcare professional. If you have questions about a medical condition or this instruction, always ask your healthcare professional. [employer] disclaims any warranty or liability for your use of this information.       Patient Instructions   Everything looks fine.    Refills of medications have been faxed to your pharmacies.     Lab results look fine.     See me in a year, sooner if problems.     "

## 2025-02-05 NOTE — PROGRESS NOTES
Preventive Care Visit  [facility]  Du Almaraz MD, Internal Medicine  Feb 5, 2025      Assessment & Plan   (Z00.00) Encounter for Medicare annual wellness exam  (primary encounter diagnosis)  Comment: Stable health. See epic orders.     (E78.5) Hyperlipidemia LDL goal <130  Comment: Recent lipid panel favorable. Continue current meds.   Plan: pravastatin (PRAVACHOL) 40 MG tablet,         OFFICE/OUTPT VISIT,EST,LEVL IV          (I10) Essential hypertension  Comment: BP at target. Continue current meds.   Plan: hydrochlorothiazide (HYDRODIURIL) 25 MG tablet,        losartan (COZAAR) 100 MG tablet, OFFICE/OUTPT         VISIT,EST,LEVL IV          (K21.9) Gastroesophageal reflux disease without esophagitis  Comment: GERD symptoms well controlled. Continue current meds.   Plan: omeprazole (PRILOSEC) 20 MG DR capsule,         OFFICE/OUTPT VISIT,EST,LEVL IV          (E03.4) Hypothyroidism due to acquired atrophy of thyroid  Comment: Euthyroid clinically and by recent TSH.   Plan: levothyroxine (SYNTHROID/LEVOTHROID) 75 MCG         tablet, OFFICE/OUTPT VISIT,EST,LEVL IV          (E83.52) Hypercalcemia  Comment: See future Epic orders.   Plan: Vitamin D Deficiency, Ionized Calcium,         Parathyroid Hormone Intact, OFFICE/OUTPT         VISIT,EST,LEVL IV          (D68.51) Heterozygous Factor V Leiden Mutation  (Z86.718) Personal history of venous thrombosis and embolism  Comment: Continue chronic oral anticoagulation.   Plan: warfarin ANTICOAGULANT (COUMADIN) 5 MG tablet,         OFFICE/OUTPT VISIT,EST,LEVL IV          (R73.03) Prediabetes  Comment: Continue to watch carbohydrate portions/choices.   Plan: folic acid (FOLVITE) 1 MG tablet          (N52.01) Erectile dysfunction due to arterial insufficiency  Comment: Provided Rx for tadalafil.   Plan: tadalafil (CIALIS) 20 MG tablet, OFFICE/OUTPT         VISIT,EST,LEVL IV          (F10.21) Alcohol dependence in remission (H)  Comment: Remains in  remission.     (Z12.5) Screening PSA (prostate specific antigen)    (Z79.899) Medication management          Patient has been advised of split billing requirements and indicates understanding: Yes        BMI  Estimated body mass index is 31.19 kg/m  as calculated from the following:    Height as of this encounter: 1.829 m (6').    Weight as of this encounter: 104.3 kg (230 lb).     Counseling  Appropriate preventive services were addressed with this patient via screening, questionnaire, or discussion as appropriate for fall prevention, nutrition, physical activity, Tobacco-use cessation, social engagement, weight loss and cognition.  Checklist reviewing preventive services available has been given to the patient.  Reviewed patient's diet, addressing concerns and/or questions.   The patient was provided with written information regarding signs of hearing loss.       Patient Instructions   Everything looks fine.    Refills of medications have been faxed to your pharmacies.     Lab results look fine.     See me in a year, sooner if problems.             Jess Esquivel is a 69 year old, presenting for the following:  Medicare Visit        2/5/2025     1:47 PM   Additional Questions   Roomed by Vanessa HONG  In addition to an Annual Wellness Exam, we addressed prediabetes, hypertension, hyperlipidemia, GERD, hypothyroidism, left bilateral hand contractures, erectile dysfunction, hearing loss.       The patient is tolerating his current medications without any adverse effects.      We reviewed recent labs.   No symptoms of thyroid disturbance. Recent TSH normal.   Reflux symptoms well controlled on omeprazole.      BP appears controlled on Losartan and hydrochlorothiazide.     We reviewed recent labs including lipid panel. Will refill pravastatin.   A1c remains in prediabetes range. Reviewed importance of maintaining weight and monitoring carbohydrate portions.     Provided Rx for tadalafil for erectile  dysfunction.     Recent calcium level elevated--we agreed to check some additional labs when he next presents for lab tests.           Health Care Directive  Patient does not have a Health Care Directive: Patient states has Advance Directive and will bring in a copy to clinic.      2/5/2025   General Health   How would you rate your overall physical health? Excellent   Feel stress (tense, anxious, or unable to sleep) Not at all         2/5/2025   Nutrition   Diet: Regular (no restrictions)         2/5/2025   Exercise   Days per week of moderate/strenous exercise 5 days         2/5/2025   Social Factors   Frequency of gathering with friends or relatives More than three times a week   Worry food won't last until get money to buy more No   Food not last or not have enough money for food? No   Do you have housing? (Housing is defined as stable permanent housing and does not include staying ouside in a car, in a tent, in an abandoned building, in an overnight shelter, or couch-surfing.) Yes   Are you worried about losing your housing? No   Lack of transportation? No   Unable to get utilities (heat,electricity)? No         2/5/2025   Fall Risk   Fallen 2 or more times in the past year? No   Trouble with walking or balance? No          2/5/2025   Activities of Daily Living- Home Safety   Needs help with the following daily activites None of the above   Safety concerns in the home None of the above         2/5/2025   Dental   Dentist two times every year? Yes         2/5/2025   Hearing Screening   Hearing concerns? (!) I NEED TO ASK PEOPLE TO SPEAK UP OR REPEAT THEMSELVES.    (!) IT'S HARD TO FOLLOW A CONVERSATION IN A NOISY RESTAURANT OR CROWDED ROOM.       Multiple values from one day are sorted in reverse-chronological order         2/5/2025   Driving Risk Screening   Patient/family members have concerns about driving No         2/5/2025   General Alertness/Fatigue Screening   Have you been more tired than usual lately?  No         2025   Urinary Incontinence Screening   Bothered by leaking urine in past 6 months No            Today's PHQ-2 Score:       2025     1:33 PM   PHQ-2 (  [employer])   Q1: Little interest or pleasure in doing things 0   Q2: Feeling down, depressed or hopeless 0   PHQ-2 Score 0    Q1: Little interest or pleasure in doing things Not at all   Q2: Feeling down, depressed or hopeless Not at all   PHQ-2 Score 0       Patient-reported           2025   Substance Use   Alcohol more than 3/day or more than 7/wk No   Do you have a current opioid prescription? No   How severe/bad is pain from 1 to 10? 0/10 (No Pain)   Do you use any other substances recreationally? No    (!) PRESCRIPTION DRUGS       Multiple values from one day are sorted in reverse-chronological order     Social History     Tobacco Use    Smoking status: Former     Current packs/day: 0.00     Average packs/day: 1 pack/day for 30.0 years (30.0 ttl pk-yrs)     Types: Cigarettes     Start date: 1975     Quit date: 2005     Years since quittin.1    Smokeless tobacco: Never   Vaping Use    Vaping status: Never Used   Substance Use Topics    Alcohol use: Yes    Drug use: No       ASCVD Risk   The 10-year ASCVD risk score (Ozzie ROJAS, et al., 2019) is: 19.1%    Values used to calculate the score:      Age: 69 years      Sex: Male      Is Non- : No      Diabetic: No      Tobacco smoker: No      Systolic Blood Pressure: 134 mmHg      Is BP treated: Yes      HDL Cholesterol: 58 mg/dL      Total Cholesterol: 162 mg/dL            Reviewed and updated as needed this visit by Provider                    Past medical, family and social histories as well as medications reviewed and updated as needed.    Current providers sharing in care for this patient include:  Patient Care Team:  Du Almaraz MD as PCP - General  Du Almaraz MD as Assigned PCP  Apryl Spring AuD as Audiologist  (Audiology)  Jim Alexander MD as MD (Otolaryngology)    The following health maintenance items are reviewed in Epic and correct as of today:  Health Maintenance   Topic Date Due    ZOSTER IMMUNIZATION (1 of 2) Never done    AORTIC ANEURYSM SCREENING (SYSTEM ASSIGNED)  Never done    INFLUENZA VACCINE (1) 09/01/2024    COVID-19 Vaccine (4 - 2024-25 season) 09/01/2024    MEDICARE ANNUAL WELLNESS VISIT  12/01/2024    ANNUAL REVIEW OF HM ORDERS  12/01/2024    BMP  01/27/2026    LIPID  01/27/2026    TSH W/FREE T4 REFLEX  01/27/2026    FALL RISK ASSESSMENT  02/05/2026    GLUCOSE  01/27/2028    ADVANCE CARE PLANNING  02/18/2029    RSV VACCINE (1 - 1-dose 75+ series) 09/03/2029    COLORECTAL CANCER SCREENING  09/18/2029    DTAP/TDAP/TD IMMUNIZATION (5 - Td or Tdap) 11/10/2030    HEPATITIS C SCREENING  Completed    PHQ-2 (once per calendar year)  Completed    Pneumococcal Vaccine: 50+ Years  Completed    HPV IMMUNIZATION  Aged Out    MENINGITIS IMMUNIZATION  Aged Out       REVIEW OF SYSTEMS: The following systems have been completely reviewed and are negative except as noted above:   Constitutional, HEENT, respiratory, cardiovascular, gastrointestinal, genitourinary, musculoskeletal, dermatologic, hematologic, endocrine, psychiatric, and neurologic systems.       Objective    Exam  /74 (BP Location: Right arm, Patient Position: Sitting, Cuff Size: Adult Large)   Pulse 82   Temp 98.4  F (36.9  C) (Tympanic)   Resp 18   Ht 1.829 m (6')   Wt 104.3 kg (230 lb)   SpO2 96%   BMI 31.19 kg/m     Estimated body mass index is 31.19 kg/m  as calculated from the following:    Height as of this encounter: 1.829 m (6').    Weight as of this encounter: 104.3 kg (230 lb).    Physical Exam  GENERAL: alert and no distress  EYES: Eyes grossly normal to inspection, PERRL and conjunctivae and sclerae normal  HENT: ear canals and TM's normal, nose and mouth without ulcers or lesions  NECK: no adenopathy, no asymmetry, masses, or  scars  RESP: lungs clear to auscultation - no rales, rhonchi or wheezes  CV: regular rate and rhythm, normal S1 S2, no S3 or S4, no murmur, click or rub, no peripheral edema  ABDOMEN: soft, nontender, no hepatosplenomegaly, no masses and bowel sounds normal  MS: no gross musculoskeletal defects noted, no edema  SKIN: no suspicious lesions or rashes  NEURO: Normal strength and tone, mentation intact and speech normal  PSYCH: mentation appears normal, affect normal/bright        2/5/2025   Mini Cog   Clock Draw Score 2 Normal   3 Item Recall 3 objects recalled   Mini Cog Total Score 5              Signed Electronically by: Du Almaraz MD,      Itraconazole Counseling:  I discussed with the patient the risks of itraconazole including but not limited to liver damage, nausea/vomiting, neuropathy, and severe allergy.  The patient understands that this medication is best absorbed when taken with acidic beverages such as non-diet cola or ginger ale.  The patient understands that monitoring is required including baseline LFTs and repeat LFTs at intervals.  The patient understands that they are to contact us or the primary physician if concerning signs are noted.

## 2025-04-11 PROBLEM — M25.572 ACUTE LEFT ANKLE PAIN: Status: RESOLVED | Noted: 2024-11-20 | Resolved: 2025-04-11

## 2025-05-16 ENCOUNTER — RESULTS FOLLOW-UP (OUTPATIENT)
Dept: ANTICOAGULATION | Facility: CLINIC | Age: 70
End: 2025-05-16

## 2025-07-07 ENCOUNTER — TELEPHONE (OUTPATIENT)
Dept: ANTICOAGULATION | Facility: CLINIC | Age: 70
End: 2025-07-07
Payer: COMMERCIAL

## 2025-07-07 NOTE — TELEPHONE ENCOUNTER
ANTICOAGULATION     Alvin Ontiveros is overdue for an INR check.     Care Everywhere updated: yes    Spoke with Alvin and scheduled lab appointment on 7/11/25    Maryam Boggs, RN  7/7/2025  Anticoagulation Clinic  [facility] for routing messages: hamzah ZHAO [facility] patient phone line: [phone]

## 2025-07-11 ENCOUNTER — DOCUMENTATION ONLY (OUTPATIENT)
Dept: ANTICOAGULATION | Facility: CLINIC | Age: 70
End: 2025-07-11

## 2025-07-11 DIAGNOSIS — D68.52 PROTHROMBIN GENE MUTATION: ICD-10-CM

## 2025-07-11 DIAGNOSIS — I26.99 PULMONARY EMBOLISM, UNSPECIFIED CHRONICITY, UNSPECIFIED PULMONARY EMBOLISM TYPE, UNSPECIFIED WHETHER ACUTE COR PULMONALE PRESENT (H): ICD-10-CM

## 2025-07-11 DIAGNOSIS — D68.51 HETEROZYGOUS FACTOR V LEIDEN MUTATION: Primary | ICD-10-CM

## 2025-07-11 DIAGNOSIS — Z79.01 LONG TERM CURRENT USE OF ANTICOAGULANT THERAPY: ICD-10-CM

## 2025-07-11 DIAGNOSIS — Z86.718 PERSONAL HISTORY OF VENOUS THROMBOSIS AND EMBOLISM: ICD-10-CM

## 2025-07-11 NOTE — PROGRESS NOTES
ANTICOAGULATION CLINIC REFERRAL RENEWAL REQUEST       An annual renewal order is required for all patients referred to [facility].?  Please review and sign the pended referral order for Alvin Ontiveros.       ANTICOAGULATION SUMMARY      Warfarin indication(s)   DVT, PE, and Heterozygous Factor V Leiden    Mechanical heart valve present?  NO       Current goal range   INR: 2.0-3.0     Goal appropriate for indication? Goal INR 2-3, standard for indication(s) above     Time in Therapeutic Range (TTR)  (Goal > 60%) 74.5%       Office visit with referring provider's group within last year yes on 2/5/25   Additional standing orders None       Kari Lizarraga, RN  [facility]

## 2025-08-11 ENCOUNTER — ANTICOAGULATION THERAPY VISIT (OUTPATIENT)
Dept: ANTICOAGULATION | Facility: CLINIC | Age: 70
End: 2025-08-11

## 2025-08-11 ENCOUNTER — LAB (OUTPATIENT)
Dept: LAB | Facility: CLINIC | Age: 70
End: 2025-08-11
Payer: COMMERCIAL

## 2025-08-11 DIAGNOSIS — D68.52 PROTHROMBIN GENE MUTATION: ICD-10-CM

## 2025-08-11 DIAGNOSIS — Z79.01 LONG TERM CURRENT USE OF ANTICOAGULANT THERAPY: ICD-10-CM

## 2025-08-11 DIAGNOSIS — Z86.718 PERSONAL HISTORY OF VENOUS THROMBOSIS AND EMBOLISM: ICD-10-CM

## 2025-08-11 DIAGNOSIS — I26.99 PULMONARY EMBOLISM, UNSPECIFIED CHRONICITY, UNSPECIFIED PULMONARY EMBOLISM TYPE, UNSPECIFIED WHETHER ACUTE COR PULMONALE PRESENT (H): ICD-10-CM

## 2025-08-11 DIAGNOSIS — Z79.01 LONG TERM CURRENT USE OF ANTICOAGULANT THERAPY: Primary | ICD-10-CM

## 2025-08-11 DIAGNOSIS — D68.51 HETEROZYGOUS FACTOR V LEIDEN MUTATION: ICD-10-CM

## 2025-08-11 LAB — INR BLD: 1.9 (ref 0.9–1.1)

## 2025-08-11 PROCEDURE — 85610 PROTHROMBIN TIME: CPT

## 2025-08-11 PROCEDURE — 36416 COLLJ CAPILLARY BLOOD SPEC: CPT
